# Patient Record
Sex: FEMALE | Race: WHITE | Employment: UNEMPLOYED | URBAN - METROPOLITAN AREA
[De-identification: names, ages, dates, MRNs, and addresses within clinical notes are randomized per-mention and may not be internally consistent; named-entity substitution may affect disease eponyms.]

---

## 2017-12-14 PROBLEM — D69.6 SEVERE THROMBOCYTOPENIA (HCC): Status: ACTIVE | Noted: 2017-12-14

## 2017-12-14 PROBLEM — E80.6 HYPERBILIRUBINEMIA: Status: ACTIVE | Noted: 2017-12-14

## 2017-12-14 PROBLEM — R74.01 TRANSAMINITIS: Status: ACTIVE | Noted: 2017-12-14

## 2017-12-15 PROBLEM — R06.02 SHORTNESS OF BREATH: Status: ACTIVE | Noted: 2017-12-15

## 2017-12-17 PROBLEM — E80.6 HYPERBILIRUBINEMIA: Status: RESOLVED | Noted: 2017-12-14 | Resolved: 2017-12-17

## 2017-12-17 PROBLEM — D69.6 SEVERE THROMBOCYTOPENIA (HCC): Status: RESOLVED | Noted: 2017-12-14 | Resolved: 2017-12-17

## 2017-12-17 PROBLEM — R06.02 SHORTNESS OF BREATH: Status: RESOLVED | Noted: 2017-12-15 | Resolved: 2017-12-17

## 2018-09-25 ENCOUNTER — HOSPITAL ENCOUNTER (EMERGENCY)
Age: 48
Discharge: HOME | End: 2018-09-25
Attending: EMERGENCY MEDICINE

## 2018-09-25 VITALS
BODY MASS INDEX: 23.6 KG/M2 | HEART RATE: 80 BPM | WEIGHT: 125 LBS | SYSTOLIC BLOOD PRESSURE: 118 MMHG | OXYGEN SATURATION: 97 % | TEMPERATURE: 98.3 F | RESPIRATION RATE: 16 BRPM | DIASTOLIC BLOOD PRESSURE: 84 MMHG | HEIGHT: 61 IN

## 2018-09-25 DIAGNOSIS — S39.012A STRAIN OF LUMBAR REGION, INITIAL ENCOUNTER: Primary | ICD-10-CM

## 2018-09-25 PROCEDURE — 96372 THER/PROPH/DIAG INJ SC/IM: CPT

## 2018-09-25 PROCEDURE — 99282 EMERGENCY DEPT VISIT SF MDM: CPT

## 2018-09-25 PROCEDURE — 6360000002 HC RX W HCPCS: Performed by: EMERGENCY MEDICINE

## 2018-09-25 RX ORDER — CYCLOBENZAPRINE HCL 10 MG
10 TABLET ORAL 3 TIMES DAILY PRN
Qty: 15 TABLET | Refills: 0 | Status: SHIPPED | OUTPATIENT
Start: 2018-09-25 | End: 2018-09-30

## 2018-09-25 RX ORDER — ORPHENADRINE CITRATE 30 MG/ML
60 INJECTION INTRAMUSCULAR; INTRAVENOUS ONCE
Status: COMPLETED | OUTPATIENT
Start: 2018-09-25 | End: 2018-09-25

## 2018-09-25 RX ORDER — KETOROLAC TROMETHAMINE 10 MG/1
10 TABLET, FILM COATED ORAL EVERY 6 HOURS PRN
Qty: 20 TABLET | Refills: 0 | Status: SHIPPED | OUTPATIENT
Start: 2018-09-25 | End: 2019-02-28 | Stop reason: SDUPTHER

## 2018-09-25 RX ORDER — KETOROLAC TROMETHAMINE 30 MG/ML
30 INJECTION, SOLUTION INTRAMUSCULAR; INTRAVENOUS ONCE
Status: COMPLETED | OUTPATIENT
Start: 2018-09-25 | End: 2018-09-25

## 2018-09-25 RX ADMIN — KETOROLAC TROMETHAMINE 30 MG: 30 INJECTION, SOLUTION INTRAMUSCULAR at 14:44

## 2018-09-25 RX ADMIN — ORPHENADRINE CITRATE 60 MG: 30 INJECTION INTRAMUSCULAR; INTRAVENOUS at 14:44

## 2018-09-25 ASSESSMENT — PAIN SCALES - GENERAL
PAINLEVEL_OUTOF10: 9
PAINLEVEL_OUTOF10: 4
PAINLEVEL_OUTOF10: 9

## 2018-09-25 ASSESSMENT — PAIN DESCRIPTION - ORIENTATION
ORIENTATION: UPPER;RIGHT;LEFT
ORIENTATION: UPPER

## 2018-09-25 ASSESSMENT — PAIN DESCRIPTION - LOCATION
LOCATION: BACK
LOCATION: BACK

## 2018-09-25 ASSESSMENT — PAIN DESCRIPTION - PAIN TYPE
TYPE: ACUTE PAIN
TYPE: ACUTE PAIN

## 2018-09-25 ASSESSMENT — PAIN DESCRIPTION - FREQUENCY: FREQUENCY: CONTINUOUS

## 2018-09-25 ASSESSMENT — PAIN DESCRIPTION - DESCRIPTORS
DESCRIPTORS: CONSTANT;DISCOMFORT;PRESSURE
DESCRIPTORS: ACHING;CONSTANT

## 2018-09-25 ASSESSMENT — PAIN DESCRIPTION - ONSET: ONSET: GRADUAL

## 2018-09-25 NOTE — LETTER
[address] [facility] Emergency Department  [facility] [address]  Phone: [phone]               September 25, 2018    Patient: Deedee Dow   YOB: 1970   Date of Visit: 9/25/2018       To Whom It May Concern:    Deedee Dow was seen and treated in our emergency department on 9/25/2018. She may return to work on 9/26/2018.   She may also be excused from work on 9/24/2018 due to illness    Sincerely,       Francisco Rust MD         Signature:__________________________________
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
## 2018-09-28 ENCOUNTER — CARE COORDINATION (OUTPATIENT)
Dept: CARE COORDINATION | Age: 48
End: 2018-09-28

## 2019-01-08 DIAGNOSIS — J44.9 COPD, SEVERE (HCC): ICD-10-CM

## 2019-01-08 RX ORDER — ALBUTEROL SULFATE 90 UG/1
2 AEROSOL, METERED RESPIRATORY (INHALATION) EVERY 6 HOURS PRN
Qty: 3 INHALER | Refills: 3 | Status: SHIPPED | OUTPATIENT
Start: 2019-01-08 | End: 2019-10-03 | Stop reason: SDUPTHER

## 2019-02-28 ENCOUNTER — OFFICE VISIT (OUTPATIENT)
Dept: FAMILY MEDICINE CLINIC | Age: 49
End: 2019-02-28
Payer: COMMERCIAL

## 2019-02-28 VITALS
WEIGHT: 120 LBS | HEART RATE: 66 BPM | RESPIRATION RATE: 18 BRPM | HEIGHT: 61 IN | TEMPERATURE: 98.6 F | BODY MASS INDEX: 22.66 KG/M2 | SYSTOLIC BLOOD PRESSURE: 124 MMHG | OXYGEN SATURATION: 97 % | DIASTOLIC BLOOD PRESSURE: 78 MMHG

## 2019-02-28 DIAGNOSIS — J01.10 ACUTE NON-RECURRENT FRONTAL SINUSITIS: ICD-10-CM

## 2019-02-28 DIAGNOSIS — Z12.11 COLON CANCER SCREENING: ICD-10-CM

## 2019-02-28 DIAGNOSIS — R06.00 DYSPNEA, UNSPECIFIED TYPE: Primary | ICD-10-CM

## 2019-02-28 DIAGNOSIS — Z12.39 BREAST CANCER SCREENING: ICD-10-CM

## 2019-02-28 PROCEDURE — G8484 FLU IMMUNIZE NO ADMIN: HCPCS | Performed by: FAMILY MEDICINE

## 2019-02-28 PROCEDURE — 3017F COLORECTAL CA SCREEN DOC REV: CPT | Performed by: FAMILY MEDICINE

## 2019-02-28 PROCEDURE — G8427 DOCREV CUR MEDS BY ELIG CLIN: HCPCS | Performed by: FAMILY MEDICINE

## 2019-02-28 PROCEDURE — 99213 OFFICE O/P EST LOW 20 MIN: CPT | Performed by: FAMILY MEDICINE

## 2019-02-28 PROCEDURE — 99212 OFFICE O/P EST SF 10 MIN: CPT | Performed by: FAMILY MEDICINE

## 2019-02-28 PROCEDURE — 4004F PT TOBACCO SCREEN RCVD TLK: CPT | Performed by: FAMILY MEDICINE

## 2019-02-28 PROCEDURE — G8420 CALC BMI NORM PARAMETERS: HCPCS | Performed by: FAMILY MEDICINE

## 2019-02-28 RX ORDER — BENZONATATE 100 MG/1
100-200 CAPSULE ORAL 3 TIMES DAILY PRN
Qty: 60 CAPSULE | Refills: 0 | Status: SHIPPED | OUTPATIENT
Start: 2019-02-28 | End: 2020-01-21

## 2019-02-28 RX ORDER — KETOROLAC TROMETHAMINE 10 MG/1
10 TABLET, FILM COATED ORAL EVERY 6 HOURS PRN
Qty: 20 TABLET | Refills: 0 | Status: SHIPPED | OUTPATIENT
Start: 2019-02-28 | End: 2019-04-18

## 2019-02-28 RX ORDER — MULTIVIT-MIN/IRON FUM/FOLIC AC 7.5 MG-4
1 TABLET ORAL DAILY
Qty: 30 TABLET | Refills: 3 | COMMUNITY
Start: 2019-02-28 | End: 2019-10-11 | Stop reason: SDUPTHER

## 2019-02-28 RX ORDER — FLUTICASONE PROPIONATE 50 MCG
2 SPRAY, SUSPENSION (ML) NASAL DAILY
Qty: 1 BOTTLE | Refills: 1 | Status: SHIPPED | OUTPATIENT
Start: 2019-02-28 | End: 2019-10-23

## 2019-02-28 ASSESSMENT — ENCOUNTER SYMPTOMS
NAUSEA: 0
CONSTIPATION: 0
COUGH: 1
CHEST TIGHTNESS: 0
ABDOMINAL PAIN: 0
VOMITING: 0
RHINORRHEA: 1
SINUS PAIN: 1
SHORTNESS OF BREATH: 1
SORE THROAT: 0
PHOTOPHOBIA: 0
SINUS PRESSURE: 1
DIARRHEA: 0
ABDOMINAL DISTENTION: 0
WHEEZING: 0

## 2019-03-12 ENCOUNTER — OFFICE VISIT (OUTPATIENT)
Dept: OBGYN | Age: 49
End: 2019-03-12
Payer: COMMERCIAL

## 2019-03-12 ENCOUNTER — HOSPITAL ENCOUNTER (OUTPATIENT)
Age: 49
Discharge: HOME | End: 2019-03-14
Payer: COMMERCIAL

## 2019-03-12 ENCOUNTER — HOSPITAL ENCOUNTER (OUTPATIENT)
Dept: GENERAL RADIOLOGY | Age: 49
Discharge: HOME | End: 2019-03-14
Payer: COMMERCIAL

## 2019-03-12 VITALS
TEMPERATURE: 98.1 F | DIASTOLIC BLOOD PRESSURE: 89 MMHG | BODY MASS INDEX: 21.9 KG/M2 | HEART RATE: 86 BPM | RESPIRATION RATE: 14 BRPM | HEIGHT: 61 IN | SYSTOLIC BLOOD PRESSURE: 128 MMHG | WEIGHT: 116 LBS

## 2019-03-12 DIAGNOSIS — N95.0 PMB (POSTMENOPAUSAL BLEEDING): ICD-10-CM

## 2019-03-12 DIAGNOSIS — Z01.419 ENCOUNTER FOR GYNECOLOGICAL EXAMINATION WITHOUT ABNORMAL FINDING: Primary | ICD-10-CM

## 2019-03-12 DIAGNOSIS — Z12.39 BREAST CANCER SCREENING: ICD-10-CM

## 2019-03-12 DIAGNOSIS — Z12.4 PAP SMEAR FOR CERVICAL CANCER SCREENING: ICD-10-CM

## 2019-03-12 DIAGNOSIS — R10.31 RLQ ABDOMINAL PAIN: ICD-10-CM

## 2019-03-12 LAB — FOLLICLE STIMULATING HORMONE: 76.6 MIU/ML

## 2019-03-12 PROCEDURE — 88175 CYTOPATH C/V AUTO FLUID REDO: CPT

## 2019-03-12 PROCEDURE — 36415 COLL VENOUS BLD VENIPUNCTURE: CPT

## 2019-03-12 PROCEDURE — 99213 OFFICE O/P EST LOW 20 MIN: CPT | Performed by: OBSTETRICS & GYNECOLOGY

## 2019-03-12 PROCEDURE — G8484 FLU IMMUNIZE NO ADMIN: HCPCS | Performed by: OBSTETRICS & GYNECOLOGY

## 2019-03-12 PROCEDURE — 99396 PREV VISIT EST AGE 40-64: CPT | Performed by: OBSTETRICS & GYNECOLOGY

## 2019-03-12 PROCEDURE — 83001 ASSAY OF GONADOTROPIN (FSH): CPT

## 2019-03-12 PROCEDURE — 36415 COLL VENOUS BLD VENIPUNCTURE: CPT | Performed by: OBSTETRICS & GYNECOLOGY

## 2019-03-12 PROCEDURE — 77067 SCR MAMMO BI INCL CAD: CPT

## 2019-03-15 ENCOUNTER — TELEPHONE (OUTPATIENT)
Dept: SURGERY | Age: 49
End: 2019-03-15

## 2019-03-22 RX ORDER — BENZONATATE 100 MG/1
100 CAPSULE ORAL 2 TIMES DAILY PRN
COMMUNITY
End: 2019-04-18

## 2019-03-26 ENCOUNTER — HOSPITAL ENCOUNTER (OUTPATIENT)
Dept: ULTRASOUND IMAGING | Age: 49
Discharge: HOME | End: 2019-03-28
Payer: COMMERCIAL

## 2019-03-26 DIAGNOSIS — R10.31 RLQ ABDOMINAL PAIN: ICD-10-CM

## 2019-03-26 DIAGNOSIS — N95.0 PMB (POSTMENOPAUSAL BLEEDING): ICD-10-CM

## 2019-03-26 PROCEDURE — 76856 US EXAM PELVIC COMPLETE: CPT

## 2019-03-26 PROCEDURE — 76830 TRANSVAGINAL US NON-OB: CPT

## 2019-04-02 ENCOUNTER — OFFICE VISIT (OUTPATIENT)
Dept: OBGYN | Age: 49
End: 2019-04-02
Payer: COMMERCIAL

## 2019-04-02 VITALS
HEART RATE: 80 BPM | BODY MASS INDEX: 22.3 KG/M2 | DIASTOLIC BLOOD PRESSURE: 77 MMHG | SYSTOLIC BLOOD PRESSURE: 145 MMHG | WEIGHT: 118 LBS

## 2019-04-02 DIAGNOSIS — R93.89 ENDOMETRIAL THICKENING ON ULTRASOUND: ICD-10-CM

## 2019-04-02 DIAGNOSIS — N95.0 PMB (POSTMENOPAUSAL BLEEDING): Primary | ICD-10-CM

## 2019-04-02 PROCEDURE — 99212 OFFICE O/P EST SF 10 MIN: CPT | Performed by: OBSTETRICS & GYNECOLOGY

## 2019-04-02 PROCEDURE — 4004F PT TOBACCO SCREEN RCVD TLK: CPT | Performed by: OBSTETRICS & GYNECOLOGY

## 2019-04-02 PROCEDURE — 3017F COLORECTAL CA SCREEN DOC REV: CPT | Performed by: OBSTETRICS & GYNECOLOGY

## 2019-04-02 PROCEDURE — G8427 DOCREV CUR MEDS BY ELIG CLIN: HCPCS | Performed by: OBSTETRICS & GYNECOLOGY

## 2019-04-02 PROCEDURE — G8420 CALC BMI NORM PARAMETERS: HCPCS | Performed by: OBSTETRICS & GYNECOLOGY

## 2019-04-02 NOTE — PROGRESS NOTES
Here to review labs and sonogram.  [address] indicates she is menopausal and the sonogram shows an Endometrium of 8mm. Therefore we need to do a Hysteroscopy and D&C as an outpatient to r/o polyps or other pathology. Discussed procedure, risks and need for same. Perforation discussed. Booklets given, all questions answered. Schedule [facility] as an outpatient. See pre-op.

## 2019-04-08 ENCOUNTER — OFFICE VISIT (OUTPATIENT)
Dept: SURGERY | Age: 49
End: 2019-04-08
Payer: COMMERCIAL

## 2019-04-08 VITALS
TEMPERATURE: 97.9 F | DIASTOLIC BLOOD PRESSURE: 78 MMHG | SYSTOLIC BLOOD PRESSURE: 130 MMHG | HEART RATE: 82 BPM | BODY MASS INDEX: 22.09 KG/M2 | OXYGEN SATURATION: 96 % | WEIGHT: 117 LBS | RESPIRATION RATE: 16 BRPM | HEIGHT: 61 IN

## 2019-04-08 DIAGNOSIS — Z91.89 AT RISK FOR COLON CANCER: Primary | ICD-10-CM

## 2019-04-08 DIAGNOSIS — Z72.0 TOBACCO ABUSE: ICD-10-CM

## 2019-04-08 PROCEDURE — 99999 PR OFFICE/OUTPT VISIT,PROCEDURE ONLY: CPT | Performed by: SURGERY

## 2019-04-08 PROCEDURE — 99202 OFFICE O/P NEW SF 15 MIN: CPT | Performed by: SURGERY

## 2019-04-08 ASSESSMENT — ENCOUNTER SYMPTOMS
BLOOD IN STOOL: 0
SHORTNESS OF BREATH: 1
CHOKING: 0
EYES NEGATIVE: 1
NAUSEA: 0
BACK PAIN: 1
ABDOMINAL PAIN: 0
ANAL BLEEDING: 0
COLOR CHANGE: 0
CHEST TIGHTNESS: 0
CONSTIPATION: 1
DIARRHEA: 0
VOMITING: 0
COUGH: 0
ABDOMINAL DISTENTION: 0
WHEEZING: 0

## 2019-04-08 NOTE — PATIENT INSTRUCTIONS
Call [phone] for any questions/concerns. BOWEL PREP INSTRUCTIONS      It is very important that you follow all of the instructions listed on this sheet carefully to ensure that your colon is cleaned out or your risk of side effects could be increased. What you will Need:  1. Nulytely, Golytely or Colyte Colon prep. You have been given a prescription. 2 Days or More Before Colonoscopy:   Obtain your colon prep from the pharmacy   Do not eat corn, tomatoes, peas, or watermelon 3 to 5 days before procedure. On the Day Before Colonoscopy:  · You may have clear liquids ONLY - No solid food. Do not drink milk   Do not eat or drink anything that is red or purple in color   Do not drink alcohol or beer. The following is OK to eat or drink:  ·  Water, Limeade or lemonade  ·  Strained fruit juices (no pulp) - including apple, orange, white grape or white  cranberry  ·  Coffee or Tea - do not use any milk or creamer  ·  Chicken broth  ·  Jello without added fruit or toppings (No red or purple)    Directions to take Colon Prep:  Step 1. The colon prep can be used with or without the flavor packs. If using flavor, tear open packet and pour into bottle before mixing. Step 2. Add lukewarm water to top line on bottle. Put cap on bottle and shake to dissolve the powder. It should clear like water. Do not put anything else in the bottle. After mixing, keep in the refrigerator. You should drink it within 48 hours. Step 3. The first bowel movement occurs about 1 hour after you start drinking the bowel prep. Continue to drink the bowel prep until the bowel movement is clear like water and free of solid bowel movement  Step 4. Drink 1 (8 oz) glass every 10 minutes. Try not to sip small amounts, but instead drink each glass within a few minutes. Note:    Feelings of bloating and/or nausea are common. This is temporary and will get better once bowel movements begin.     If you have nausea or heart or kidney condition   Treatment with certain medicines, including aspirin and other drugs with anticoagulant or blood-thinning properties   Prior abdominal surgery or radiation treatments   Active colitis , diverticulitis , or other acute bowel disease   Previous treatment with radiation therapy     Be sure to discuss these risks with your doctor before the procedure. What to Expect   Prior to Procedure   Your doctor will likely do the following:   Physical exam   Health history   Review of medicines   Test your stool for hidden blood (called \"occult blood\")     Your colon must be completely clean before the procedure. Any stool left in the intestine will block the view. This preparation may start several days before the procedure. Follow your doctor's instructions. Leading up to your procedure:   Talk to your doctor about your medicines. You may be asked to stop taking some medicines up to one week before the procedure, like:   Anti-inflammatory drugs (e.g., aspirin )   Blood thinners like clopidogrel (Plavix) or warfarin (Coumadin)   Iron supplements or vitamins containing iron   The day or days before your procedure, go on a clear liquid diet (clear broth, clear juice, clear jello) with no red coloring  Do not eat or drink anything after midnight. Wear comfortable clothing. If you have diabetes, ask your doctor if you need to adjust your diabetes medicine on the day prior to your procedure and the day of your procedure. Arrange for a ride home after the procedure. Anesthesia   You will receive intravenous sedation medicine for the procedure so you will not feel anything during the procedure. Description of the Procedure   You will lie on your left side with knees bent and drawn up toward your chest. The colonoscope will be slowly inserted through the rectum and into the bowel. The colonoscope will inject air into the colon.  A small attached video camera will allow the doctor to view the colon's lining on a screen. The doctor will continue guiding the tool through the bowel and assess the lining. A tissue sample or polyps may be removed during the procedure. How Long Will It Take? Usually it takes about 30 to 45 minutes     Will It Hurt? Most people do not feel anything during the procedure and will not remember the procedure. After the procedure, gas pains and cramping are common. These pains should go away with the passing of gas. Post-procedure Care   If any tissue was removed: It will be sent to a lab to be examined. It may take 1-2 weeks for results. The doctor will usually give an initial report after the scope is removed. Other tests may be recommended. A small amount of bleeding may occur during the first few days after the procedure. When you return home after the procedure, be sure to follow your doctor's instructions, which may include:   Resume medicines as instructed by your doctor. Resume normal diet, unless directed otherwise by your doctor. The sedative will make you drowsy. Avoid driving, operating machinery, or making important decisions for the rest of the day. Rest for the remainder of the day. After arriving home, contact your doctor if any of the following occurs:   Bleeding from your rectum, notify your doctor if you pass a teaspoonful of blood or more. Black, tarry stools   Severe abdominal pain   Hard, swollen abdomen   Signs of infection, including fever or chills   Inability to pass gas or stool   Coughing, shortness of breath, chest pain, severe nausea or vomiting     In case of an emergency, CALL 911 .
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
## 2019-04-09 ENCOUNTER — TELEPHONE (OUTPATIENT)
Dept: SURGERY | Age: 49
End: 2019-04-09

## 2019-04-09 NOTE — TELEPHONE ENCOUNTER
Scheduled pt for colonoscopy with Dr. Santiago Plunkett on 5/7/19 at 8:00AM. Pt needs to arrive at [facility] at 7:00AM. Confirmed procedure date time and location with patient. Sent instruction sheet.     Electronically signed by Serenity Nathan on 4/9/19 at 10:07 AM

## 2019-04-16 ENCOUNTER — PREP FOR PROCEDURE (OUTPATIENT)
Dept: OBGYN | Age: 49
End: 2019-04-16

## 2019-04-16 ENCOUNTER — OFFICE VISIT (OUTPATIENT)
Dept: OBGYN | Age: 49
End: 2019-04-16
Payer: COMMERCIAL

## 2019-04-16 VITALS
TEMPERATURE: 98 F | WEIGHT: 120 LBS | SYSTOLIC BLOOD PRESSURE: 167 MMHG | HEART RATE: 77 BPM | BODY MASS INDEX: 22.66 KG/M2 | DIASTOLIC BLOOD PRESSURE: 82 MMHG | RESPIRATION RATE: 18 BRPM | HEIGHT: 61 IN

## 2019-04-16 DIAGNOSIS — R93.89 ENDOMETRIAL THICKENING ON ULTRASOUND: ICD-10-CM

## 2019-04-16 DIAGNOSIS — N95.0 PMB (POSTMENOPAUSAL BLEEDING): Primary | ICD-10-CM

## 2019-04-16 PROCEDURE — 4004F PT TOBACCO SCREEN RCVD TLK: CPT | Performed by: OBSTETRICS & GYNECOLOGY

## 2019-04-16 PROCEDURE — G8427 DOCREV CUR MEDS BY ELIG CLIN: HCPCS | Performed by: OBSTETRICS & GYNECOLOGY

## 2019-04-16 PROCEDURE — 99212 OFFICE O/P EST SF 10 MIN: CPT | Performed by: OBSTETRICS & GYNECOLOGY

## 2019-04-16 PROCEDURE — G8420 CALC BMI NORM PARAMETERS: HCPCS | Performed by: OBSTETRICS & GYNECOLOGY

## 2019-04-16 PROCEDURE — 3017F COLORECTAL CA SCREEN DOC REV: CPT | Performed by: OBSTETRICS & GYNECOLOGY

## 2019-04-16 RX ORDER — SODIUM CHLORIDE, SODIUM LACTATE, POTASSIUM CHLORIDE, CALCIUM CHLORIDE 600; 310; 30; 20 MG/100ML; MG/100ML; MG/100ML; MG/100ML
INJECTION, SOLUTION INTRAVENOUS CONTINUOUS
Status: CANCELLED | OUTPATIENT
Start: 2019-04-16

## 2019-04-16 RX ORDER — SODIUM CHLORIDE 0.9 % (FLUSH) 0.9 %
10 SYRINGE (ML) INJECTION PRN
Status: CANCELLED | OUTPATIENT
Start: 2019-04-16

## 2019-04-16 RX ORDER — SODIUM CHLORIDE 0.9 % (FLUSH) 0.9 %
10 SYRINGE (ML) INJECTION EVERY 12 HOURS SCHEDULED
Status: CANCELLED | OUTPATIENT
Start: 2019-04-16

## 2019-04-16 NOTE — PROGRESS NOTES
Here today for pre-op exam.  Scheduled for Hysteroscopy, D&C next Tuesday. Procedure reviewed at length as well as post o course. Risks, clinton perforation discussed. All questions were answered for the patient. Upper PE is wnl all regions, systems and areas. EMC was only 6mm on sonogram with a small fibroid also noted. ROS: Negative. Proceed with Hysteroscopy, Dilatation and curettage Next week as scheduled. Post operative appt scheduled as well today.

## 2019-04-16 NOTE — PROGRESS NOTES
Patient here for preop exam for [facility] scheduled for 4/23/19 at 0730. POC explained to patient and discharge instructions given to patient by Dr. Ute Acevedo. Preop instructions explained to patient by Norma Carrillo RN. Pt aware she is to be at hospital at 0530 am and NPO after midnight.

## 2019-04-18 RX ORDER — CLONIDINE HYDROCHLORIDE 0.2 MG/1
0.2 TABLET ORAL EVERY EVENING
Status: ON HOLD | COMMUNITY
End: 2019-10-27 | Stop reason: HOSPADM

## 2019-04-18 RX ORDER — DIAZEPAM 5 MG/1
5 TABLET ORAL EVERY 12 HOURS PRN
COMMUNITY
End: 2019-10-11 | Stop reason: ALTCHOICE

## 2019-04-18 RX ORDER — CITALOPRAM 20 MG/1
20 TABLET ORAL EVERY EVENING
COMMUNITY
End: 2019-10-11

## 2019-04-18 NOTE — H&P
[address]                  [address] [facility],  [facility]                              HISTORY AND PHYSICAL    PATIENT NAME: Villa Hummel                   :        1970  MED REC NO:   [id number]                            ROOM:  ACCOUNT NO:   [de-identified]                           ADMIT DATE: 2019  PROVIDER:     Reji Goncalves MD    This is an outpatient history and physical for surgery on 2019. CHIEF COMPLAINT:  Postmenopausal bleeding. HISTORY OF PRESENT ILLNESS:  The patient is a 25-year-old G2, P3 female  whose periods had stopped and then she restarted. This is  postmenopausal bleeding and she enters for dilatation and curettage and  hysteroscopy as an outpatient on 2019. The patient has been fully  counseled in this procedure and its risks including perforation and  enters freely for same. REVIEW OF SYSTEMS:  Negative. PAST MEDICAL HISTORY:  She has had tubal ligation in the past, a D and C  in the past with possible ablation and the LEEP procedure in the past as  well, also has had some bladder repair work. PHYSICAL EXAMINATION:  GENERAL:  A well-developed, well-nourished white female, in no acute  distress. HEENT:  Clear. NECK:  Supple without masses. LUNGS:  Clear to auscultation and percussion. HEART:  Regular rhythm without gallops or murmurs. ABDOMEN:  Soft without masses. Pelvic exam was negative. Normal bowel  sounds were present. IMPRESSION AND PLAN:  Postmenopausal bleeding. The patient is admitted  for hysteroscopy, dilatation and curettage as an outpatient 2019  at 0730.         Mercedez Escobar MD    D: 2019 12:47:13       T: 2019 12:48:47     [email or web address]  Job#: [id number]     Doc#: [id number]    CC:

## 2019-04-23 ENCOUNTER — ANESTHESIA EVENT (OUTPATIENT)
Dept: OPERATING ROOM | Age: 49
End: 2019-04-23
Payer: COMMERCIAL

## 2019-04-23 ENCOUNTER — TELEPHONE (OUTPATIENT)
Dept: OBGYN | Age: 49
End: 2019-04-23

## 2019-04-23 ENCOUNTER — ANESTHESIA (OUTPATIENT)
Dept: OPERATING ROOM | Age: 49
End: 2019-04-23
Payer: COMMERCIAL

## 2019-04-23 ENCOUNTER — HOSPITAL ENCOUNTER (OUTPATIENT)
Age: 49
Setting detail: OUTPATIENT SURGERY
Discharge: HOME | End: 2019-04-23
Attending: OBSTETRICS & GYNECOLOGY | Admitting: OBSTETRICS & GYNECOLOGY
Payer: COMMERCIAL

## 2019-04-23 VITALS
HEIGHT: 61 IN | BODY MASS INDEX: 22.66 KG/M2 | WEIGHT: 120 LBS | OXYGEN SATURATION: 93 % | SYSTOLIC BLOOD PRESSURE: 134 MMHG | DIASTOLIC BLOOD PRESSURE: 65 MMHG | HEART RATE: 69 BPM | RESPIRATION RATE: 16 BRPM | TEMPERATURE: 97.7 F

## 2019-04-23 VITALS — DIASTOLIC BLOOD PRESSURE: 76 MMHG | TEMPERATURE: 93 F | OXYGEN SATURATION: 100 % | SYSTOLIC BLOOD PRESSURE: 116 MMHG

## 2019-04-23 DIAGNOSIS — G89.18 POST-OPERATIVE PAIN: Primary | ICD-10-CM

## 2019-04-23 DIAGNOSIS — G89.18 POST-OP PAIN: ICD-10-CM

## 2019-04-23 DIAGNOSIS — N95.0 PMB (POSTMENOPAUSAL BLEEDING): ICD-10-CM

## 2019-04-23 LAB
HCG, URINE, POC: NEGATIVE
HCT VFR BLD CALC: 44.3 % (ref 34–48)
HEMOGLOBIN: 14.4 G/DL (ref 11.5–15.5)
Lab: NORMAL
MCH RBC QN AUTO: 30.7 PG (ref 26–35)
MCHC RBC AUTO-ENTMCNC: 32.5 % (ref 32–34.5)
MCV RBC AUTO: 94.5 FL (ref 80–99.9)
NEGATIVE QC PASS/FAIL: NORMAL
PDW BLD-RTO: 13.1 FL (ref 11.5–15)
PLATELET # BLD: 221 E9/L (ref 130–450)
PMV BLD AUTO: 9.5 FL (ref 7–12)
POSITIVE QC PASS/FAIL: NORMAL
RBC # BLD: 4.69 E12/L (ref 3.5–5.5)
WBC # BLD: 5.5 E9/L (ref 4.5–11.5)

## 2019-04-23 PROCEDURE — 6370000000 HC RX 637 (ALT 250 FOR IP): Performed by: ANESTHESIOLOGY

## 2019-04-23 PROCEDURE — 7100000010 HC PHASE II RECOVERY - FIRST 15 MIN: Performed by: OBSTETRICS & GYNECOLOGY

## 2019-04-23 PROCEDURE — 88305 TISSUE EXAM BY PATHOLOGIST: CPT

## 2019-04-23 PROCEDURE — 3700000001 HC ADD 15 MINUTES (ANESTHESIA): Performed by: OBSTETRICS & GYNECOLOGY

## 2019-04-23 PROCEDURE — 3600000013 HC SURGERY LEVEL 3 ADDTL 15MIN: Performed by: OBSTETRICS & GYNECOLOGY

## 2019-04-23 PROCEDURE — 2709999900 HC NON-CHARGEABLE SUPPLY: Performed by: OBSTETRICS & GYNECOLOGY

## 2019-04-23 PROCEDURE — 58558 HYSTEROSCOPY BIOPSY: CPT | Performed by: OBSTETRICS & GYNECOLOGY

## 2019-04-23 PROCEDURE — 3700000000 HC ANESTHESIA ATTENDED CARE: Performed by: OBSTETRICS & GYNECOLOGY

## 2019-04-23 PROCEDURE — 7100000001 HC PACU RECOVERY - ADDTL 15 MIN: Performed by: OBSTETRICS & GYNECOLOGY

## 2019-04-23 PROCEDURE — 7100000011 HC PHASE II RECOVERY - ADDTL 15 MIN: Performed by: OBSTETRICS & GYNECOLOGY

## 2019-04-23 PROCEDURE — 6360000002 HC RX W HCPCS: Performed by: NURSE ANESTHETIST, CERTIFIED REGISTERED

## 2019-04-23 PROCEDURE — 2580000003 HC RX 258: Performed by: OBSTETRICS & GYNECOLOGY

## 2019-04-23 PROCEDURE — 3600000003 HC SURGERY LEVEL 3 BASE: Performed by: OBSTETRICS & GYNECOLOGY

## 2019-04-23 PROCEDURE — 85027 COMPLETE CBC AUTOMATED: CPT

## 2019-04-23 PROCEDURE — 7100000000 HC PACU RECOVERY - FIRST 15 MIN: Performed by: OBSTETRICS & GYNECOLOGY

## 2019-04-23 PROCEDURE — 6360000002 HC RX W HCPCS: Performed by: ANESTHESIOLOGY

## 2019-04-23 PROCEDURE — 94664 DEMO&/EVAL PT USE INHALER: CPT

## 2019-04-23 RX ORDER — DEXAMETHASONE SODIUM PHOSPHATE 10 MG/ML
INJECTION INTRAMUSCULAR; INTRAVENOUS PRN
Status: DISCONTINUED | OUTPATIENT
Start: 2019-04-23 | End: 2019-04-23 | Stop reason: SDUPTHER

## 2019-04-23 RX ORDER — FENTANYL CITRATE 50 UG/ML
INJECTION, SOLUTION INTRAMUSCULAR; INTRAVENOUS PRN
Status: DISCONTINUED | OUTPATIENT
Start: 2019-04-23 | End: 2019-04-23 | Stop reason: SDUPTHER

## 2019-04-23 RX ORDER — MIDAZOLAM HYDROCHLORIDE 1 MG/ML
INJECTION INTRAMUSCULAR; INTRAVENOUS PRN
Status: DISCONTINUED | OUTPATIENT
Start: 2019-04-23 | End: 2019-04-23 | Stop reason: SDUPTHER

## 2019-04-23 RX ORDER — FENTANYL CITRATE 50 UG/ML
25 INJECTION, SOLUTION INTRAMUSCULAR; INTRAVENOUS EVERY 5 MIN PRN
Status: DISCONTINUED | OUTPATIENT
Start: 2019-04-23 | End: 2019-04-23 | Stop reason: HOSPADM

## 2019-04-23 RX ORDER — PHENYLEPHRINE HYDROCHLORIDE 10 MG/ML
INJECTION INTRAVENOUS PRN
Status: DISCONTINUED | OUTPATIENT
Start: 2019-04-23 | End: 2019-04-23 | Stop reason: SDUPTHER

## 2019-04-23 RX ORDER — LIDOCAINE HYDROCHLORIDE 20 MG/ML
INJECTION, SOLUTION INTRAVENOUS PRN
Status: DISCONTINUED | OUTPATIENT
Start: 2019-04-23 | End: 2019-04-23 | Stop reason: SDUPTHER

## 2019-04-23 RX ORDER — IBUPROFEN 200 MG
200 TABLET ORAL EVERY 6 HOURS PRN
Qty: 120 TABLET | Refills: 3 | Status: ON HOLD | OUTPATIENT
Start: 2019-04-23 | End: 2019-10-27 | Stop reason: HOSPADM

## 2019-04-23 RX ORDER — ONDANSETRON 2 MG/ML
INJECTION INTRAMUSCULAR; INTRAVENOUS PRN
Status: DISCONTINUED | OUTPATIENT
Start: 2019-04-23 | End: 2019-04-23 | Stop reason: SDUPTHER

## 2019-04-23 RX ORDER — SODIUM CHLORIDE, SODIUM LACTATE, POTASSIUM CHLORIDE, CALCIUM CHLORIDE 600; 310; 30; 20 MG/100ML; MG/100ML; MG/100ML; MG/100ML
INJECTION, SOLUTION INTRAVENOUS CONTINUOUS
Status: DISCONTINUED | OUTPATIENT
Start: 2019-04-23 | End: 2019-04-23 | Stop reason: HOSPADM

## 2019-04-23 RX ORDER — HYDROCODONE BITARTRATE AND ACETAMINOPHEN 5; 325 MG/1; MG/1
2 TABLET ORAL PRN
Status: DISCONTINUED | OUTPATIENT
Start: 2019-04-23 | End: 2019-04-23 | Stop reason: HOSPADM

## 2019-04-23 RX ORDER — MORPHINE SULFATE 2 MG/ML
2 INJECTION, SOLUTION INTRAMUSCULAR; INTRAVENOUS EVERY 5 MIN PRN
Status: DISCONTINUED | OUTPATIENT
Start: 2019-04-23 | End: 2019-04-23 | Stop reason: HOSPADM

## 2019-04-23 RX ORDER — IPRATROPIUM BROMIDE AND ALBUTEROL SULFATE 2.5; .5 MG/3ML; MG/3ML
1 SOLUTION RESPIRATORY (INHALATION) ONCE
Status: COMPLETED | OUTPATIENT
Start: 2019-04-23 | End: 2019-04-23

## 2019-04-23 RX ORDER — SODIUM CHLORIDE 0.9 % (FLUSH) 0.9 %
10 SYRINGE (ML) INJECTION PRN
Status: DISCONTINUED | OUTPATIENT
Start: 2019-04-23 | End: 2019-04-23 | Stop reason: HOSPADM

## 2019-04-23 RX ORDER — PROPOFOL 10 MG/ML
INJECTION, EMULSION INTRAVENOUS PRN
Status: DISCONTINUED | OUTPATIENT
Start: 2019-04-23 | End: 2019-04-23 | Stop reason: SDUPTHER

## 2019-04-23 RX ORDER — HYDROCODONE BITARTRATE AND ACETAMINOPHEN 5; 325 MG/1; MG/1
1 TABLET ORAL PRN
Status: DISCONTINUED | OUTPATIENT
Start: 2019-04-23 | End: 2019-04-23 | Stop reason: HOSPADM

## 2019-04-23 RX ORDER — SODIUM CHLORIDE 0.9 % (FLUSH) 0.9 %
10 SYRINGE (ML) INJECTION EVERY 12 HOURS SCHEDULED
Status: DISCONTINUED | OUTPATIENT
Start: 2019-04-23 | End: 2019-04-23 | Stop reason: HOSPADM

## 2019-04-23 RX ADMIN — DEXAMETHASONE SODIUM PHOSPHATE 10 MG: 10 INJECTION INTRAMUSCULAR; INTRAVENOUS at 07:28

## 2019-04-23 RX ADMIN — ONDANSETRON HYDROCHLORIDE 4 MG: 2 INJECTION, SOLUTION INTRAMUSCULAR; INTRAVENOUS at 08:01

## 2019-04-23 RX ADMIN — IPRATROPIUM BROMIDE AND ALBUTEROL SULFATE 1 AMPULE: .5; 3 SOLUTION RESPIRATORY (INHALATION) at 06:22

## 2019-04-23 RX ADMIN — HYDROMORPHONE HYDROCHLORIDE 0.5 MG: 1 INJECTION, SOLUTION INTRAMUSCULAR; INTRAVENOUS; SUBCUTANEOUS at 00:05

## 2019-04-23 RX ADMIN — HYDROMORPHONE HYDROCHLORIDE 0.5 MG: 1 INJECTION, SOLUTION INTRAMUSCULAR; INTRAVENOUS; SUBCUTANEOUS at 08:25

## 2019-04-23 RX ADMIN — SODIUM CHLORIDE, POTASSIUM CHLORIDE, SODIUM LACTATE AND CALCIUM CHLORIDE: 600; 310; 30; 20 INJECTION, SOLUTION INTRAVENOUS at 07:23

## 2019-04-23 RX ADMIN — PROPOFOL 180 MG: 10 INJECTION, EMULSION INTRAVENOUS at 07:28

## 2019-04-23 RX ADMIN — SODIUM CHLORIDE, POTASSIUM CHLORIDE, SODIUM LACTATE AND CALCIUM CHLORIDE: 600; 310; 30; 20 INJECTION, SOLUTION INTRAVENOUS at 06:04

## 2019-04-23 RX ADMIN — HYDROMORPHONE HYDROCHLORIDE 0.5 MG: 1 INJECTION, SOLUTION INTRAMUSCULAR; INTRAVENOUS; SUBCUTANEOUS at 08:45

## 2019-04-23 RX ADMIN — LIDOCAINE HYDROCHLORIDE 60 MG: 20 INJECTION, SOLUTION INTRAVENOUS at 07:28

## 2019-04-23 RX ADMIN — HYDROMORPHONE HYDROCHLORIDE 0.5 MG: 1 INJECTION, SOLUTION INTRAMUSCULAR; INTRAVENOUS; SUBCUTANEOUS at 08:16

## 2019-04-23 RX ADMIN — MIDAZOLAM HYDROCHLORIDE 2 MG: 1 INJECTION, SOLUTION INTRAMUSCULAR; INTRAVENOUS at 07:23

## 2019-04-23 RX ADMIN — FENTANYL CITRATE 100 MCG: 50 INJECTION, SOLUTION INTRAMUSCULAR; INTRAVENOUS at 07:28

## 2019-04-23 RX ADMIN — PHENYLEPHRINE HYDROCHLORIDE 100 MCG: 10 INJECTION INTRAVENOUS at 07:37

## 2019-04-23 ASSESSMENT — PULMONARY FUNCTION TESTS
PIF_VALUE: 22
PIF_VALUE: 24
PIF_VALUE: 17
PIF_VALUE: 4
PIF_VALUE: 6
PIF_VALUE: 0
PIF_VALUE: 24
PIF_VALUE: 26
PIF_VALUE: 0
PIF_VALUE: 6
PIF_VALUE: 21
PIF_VALUE: 23
PIF_VALUE: 23
PIF_VALUE: 21
PIF_VALUE: 23
PIF_VALUE: 23
PIF_VALUE: 14
PIF_VALUE: 24
PIF_VALUE: 0
PIF_VALUE: 2
PIF_VALUE: 0
PIF_VALUE: 10
PIF_VALUE: 19
PIF_VALUE: 23
PIF_VALUE: 0
PIF_VALUE: 21
PIF_VALUE: 0
PIF_VALUE: 14
PIF_VALUE: 14
PIF_VALUE: 20
PIF_VALUE: 23
PIF_VALUE: 2
PIF_VALUE: 20
PIF_VALUE: 24
PIF_VALUE: 1
PIF_VALUE: 21

## 2019-04-23 ASSESSMENT — PAIN DESCRIPTION - DESCRIPTORS
DESCRIPTORS: CRAMPING;DISCOMFORT
DESCRIPTORS: CRAMPING
DESCRIPTORS: CONSTANT
DESCRIPTORS: CRAMPING;DISCOMFORT
DESCRIPTORS: CRAMPING;DISCOMFORT

## 2019-04-23 ASSESSMENT — ENCOUNTER SYMPTOMS: SHORTNESS OF BREATH: 1

## 2019-04-23 ASSESSMENT — PAIN DESCRIPTION - LOCATION
LOCATION: ABDOMEN

## 2019-04-23 ASSESSMENT — PAIN SCALES - GENERAL
PAINLEVEL_OUTOF10: 3
PAINLEVEL_OUTOF10: 10
PAINLEVEL_OUTOF10: 4
PAINLEVEL_OUTOF10: 8
PAINLEVEL_OUTOF10: 10
PAINLEVEL_OUTOF10: 10
PAINLEVEL_OUTOF10: 4
PAINLEVEL_OUTOF10: 3

## 2019-04-23 ASSESSMENT — PAIN DESCRIPTION - FREQUENCY
FREQUENCY: CONTINUOUS
FREQUENCY: INTERMITTENT

## 2019-04-23 ASSESSMENT — LIFESTYLE VARIABLES: SMOKING_STATUS: 1

## 2019-04-23 ASSESSMENT — PAIN DESCRIPTION - ONSET: ONSET: ON-GOING

## 2019-04-23 ASSESSMENT — PAIN - FUNCTIONAL ASSESSMENT
PAIN_FUNCTIONAL_ASSESSMENT: ACTIVITIES ARE NOT PREVENTED
PAIN_FUNCTIONAL_ASSESSMENT: 0-10

## 2019-04-23 ASSESSMENT — PAIN DESCRIPTION - PROGRESSION: CLINICAL_PROGRESSION: GRADUALLY IMPROVING

## 2019-04-23 NOTE — OP NOTE
[address]                  [address] [facility],  [facility]                                OPERATIVE REPORT    PATIENT NAME: Miguel Pineda                   :        1970  MED REC NO:   [id number]                            ROOM:  ACCOUNT NO:   [de-identified]                           ADMIT DATE: 2019  PROVIDER:     Ronnie Jiménez MD    DATE OF PROCEDURE:  2019    PREOPERATIVE DIAGNOSIS:  Postmenopausal bleeding. POSTOPERATIVE DIAGNOSIS:  Pathology pending. PROCEDURE:  Hysteroscopy, dilatation and curettage. SURGEON:  Ronnie Jiménez MD    DESCRIPTION OF THE PROCEDURE:  After time-out, the patient was placed  under general anesthesia in the dorsal lithotomy position. Bimanual  examination under anesthesia just showed a small uterus in the midline. A weighted speculum was placed in the anterior cervix, grasped with a  sharp tooth tenaculum, and the cervix was progressively dilated with  Hanks dilator to #17 Hanks dilator. The uterine depth is quite small,  only approximately 5 to 6 cm. The 5-mm contact hysteroscope was then  inserted, and direct contact hysteroscopy with saline infusion was  performed which showed a small symmetrical cavity with some very minimal  scarring from her previous ablation. The endometrium throughout  appeared pale and atrophic. All areas were photodocumented, and the  scope was withdrawn. Dilatation was now carried out to #19 Hanks  dilator. The uterine cavity was then curetted with a #1 curette as well as  Kevorkian curette until a sharp gradient sound was obtained in all four  quadrants across the fundus and in the cornual regions indicative of a  clean cavity. Cavity was recuretted with a #1 curette, and only scant  amounts of tissue were obtained throughout the curettage as would be  expected from the appearance of her endometrium.   Specimens were  gathered on Telfa and sent for pathology. Instruments removed. There  is no bleeding from the tenaculum sites, and the patient was awakened  and transferred to recovery room in stable condition. She will be  followed in the clinic as scheduled for her postop visit and results  next week. Estimated blood loss for the procedure is less than 5 ml. The patient tolerated well and was stable upon transfer to recovery  room. SPECIMEN TO LABORATORY:  Endometrial curettings.         Taya Matthew MD    D: 04/23/2019 8:15:43       T: 04/23/2019 8:18:18     [email or web address]  Job#: [id number]     Doc#: [id number]    CC:

## 2019-04-23 NOTE — BRIEF OP NOTE
Brief Postoperative Note  ______________________________________________________________    Patient: Bell Jane  YOB: 1970  MRN: [id number]  Date of Procedure: 4/23/2019    Pre-Op Diagnosis: POST MENOPAUSAL BLEED    Post-Op Diagnosis: Same       Procedure(s):  DILATATION AND CURETTAGE HYSTEROSCOPY    Anesthesia: General    Surgeon(s):  Mahi Hickey MD    Assistant: None    Estimated Blood Loss (mL): less than 50     Complications: None    Specimens:   ID Type Source Tests Collected by Time Destination   A : Endometrial Currettings Genital Vaginal SURGICAL PATHOLOGY Mahi Hickey MD 4/23/2019 0750        Implants:  * No implants in log *      Drains: * No LDAs found *    Findings: See dictated operative report please    Mahi Hickey MD  Date: 4/23/2019  Time: 8:08 AM

## 2019-04-23 NOTE — PROGRESS NOTES
Dr Mcdonnell Medicine notified SaO2 88% on RA, patient used rescue inhaler @ 0430 today & does not use home O2. See orders. O2 4L/NC applied per order.
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
## 2019-04-23 NOTE — INTERVAL H&P NOTE
H&P Update    Patient's History and Physical from April ,  was reviewed. Patient examined. There has been no change. O2 sat low and on O2 4 litres    Proceure reviwed as well as post op nstructions. .    Kenia Steele

## 2019-04-23 NOTE — TELEPHONE ENCOUNTER
Patient called into office stating had her [facility] this am and thought dr Starlet Osgood was sending rx for motrin to her pharmacy but she called and no rx sent . Patient asking for motrin rx to be sent to her pharmacy, pharmacy is correct in epic I checked. Please advise.

## 2019-04-24 ENCOUNTER — TELEPHONE (OUTPATIENT)
Dept: SURGERY | Age: 49
End: 2019-04-24

## 2019-04-24 NOTE — TELEPHONE ENCOUNTER
MA contacted [facility] for prior authorization. No prior authorization needed for outpatient colonoscopy with Dr. Yoan Goins at [facility] in [place] due to doctor and facility being in network. MA Spoke with Britt Gold, authorization Specialist. Reference number [id number]. MA scanned authorization form in media tab.     Electronically signed by Khushboo Jackson on 4/24/19 at 9:54 AM

## 2019-05-01 NOTE — PROGRESS NOTES
[facility] PRE-ADMISSION TESTING ENDOSCOPY INSTRUCTIONS- PAT-phone number:[phone]    ENDOSCOPY INSTRUCTIONS:   [x] Bowel prep instructions reviewed. [x] Nothing by mouth after midnight, including gum, candy, mints, or water. [x] You may brush your teeth, gargle, but do NOT swallow water. [x] Do not wear makeup, lotions, powders, deodorant. Nail polish as directed by the nurse. [x] Arrange transportation to and from the hospital.  Arrange for someone to be with you for the remainder of the day due to having had anesthesia. PARKING INSTRUCTIONS:   [x] Arrival Time:___0730____________________  · [x] Parking lot  \"I\" OR 1 is located on [facility] (the corner of [facility]). To enter, press the button and the gate will lift. A free token will be provided to exit the lot. One car per patient is allowed to park in this lot. All other cars are to park on [address] [place] [address] either in the parking garage or the handicap lot. [] To reach the [facility] from [address] [place] [address], upon entering the hospital, take elevator B to the 3rd floor. EDUCATION INSTRUCTIONS:  [x] Bring a complete list of your medications, please write the last time you took the medicine, give this list to the nurse. [x] Take the following medications the morning of surgery with 1-2 ounces of water: SEE LIST  [x] Stop herbal supplements and vitamins 5 days before your surgery. [] DO NOT take any diabetic medicine the morning of surgery. Follow instructions for insulin the day before surgery. [] If you are diabetic and your blood sugar is low or you feel symptomatic, you may drink 1-2 ounces of apple juice or take a glucose tablet. The morning of your procedure, you may call the pre-op area if you have concerns about your blood sugar [phone]. [x] Use your inhalers the morning of surgery. Bring your emergency inhaler with you day of surgery.   [x] Follow physician instructions regarding

## 2019-05-06 ENCOUNTER — ANESTHESIA EVENT (OUTPATIENT)
Dept: ENDOSCOPY | Age: 49
End: 2019-05-06
Payer: COMMERCIAL

## 2019-05-07 ENCOUNTER — ANESTHESIA (OUTPATIENT)
Dept: ENDOSCOPY | Age: 49
End: 2019-05-07
Payer: COMMERCIAL

## 2019-05-07 ENCOUNTER — HOSPITAL ENCOUNTER (OUTPATIENT)
Age: 49
Setting detail: OUTPATIENT SURGERY
Discharge: HOME | End: 2019-05-07
Attending: SURGERY | Admitting: SURGERY
Payer: COMMERCIAL

## 2019-05-07 VITALS — OXYGEN SATURATION: 97 % | DIASTOLIC BLOOD PRESSURE: 71 MMHG | SYSTOLIC BLOOD PRESSURE: 113 MMHG

## 2019-05-07 VITALS
HEIGHT: 61 IN | HEART RATE: 76 BPM | OXYGEN SATURATION: 95 % | SYSTOLIC BLOOD PRESSURE: 119 MMHG | TEMPERATURE: 98.6 F | RESPIRATION RATE: 20 BRPM | BODY MASS INDEX: 22.66 KG/M2 | WEIGHT: 120 LBS | DIASTOLIC BLOOD PRESSURE: 75 MMHG

## 2019-05-07 PROCEDURE — 2709999900 HC NON-CHARGEABLE SUPPLY: Performed by: SURGERY

## 2019-05-07 PROCEDURE — 6360000002 HC RX W HCPCS

## 2019-05-07 PROCEDURE — 45378 DIAGNOSTIC COLONOSCOPY: CPT | Performed by: SURGERY

## 2019-05-07 PROCEDURE — 3700000001 HC ADD 15 MINUTES (ANESTHESIA): Performed by: SURGERY

## 2019-05-07 PROCEDURE — 2580000003 HC RX 258: Performed by: SURGERY

## 2019-05-07 PROCEDURE — 2500000003 HC RX 250 WO HCPCS

## 2019-05-07 PROCEDURE — 7100000010 HC PHASE II RECOVERY - FIRST 15 MIN: Performed by: SURGERY

## 2019-05-07 PROCEDURE — 7100000011 HC PHASE II RECOVERY - ADDTL 15 MIN: Performed by: SURGERY

## 2019-05-07 PROCEDURE — 3700000000 HC ANESTHESIA ATTENDED CARE: Performed by: SURGERY

## 2019-05-07 PROCEDURE — 3609027000 HC COLONOSCOPY: Performed by: SURGERY

## 2019-05-07 RX ORDER — MIDAZOLAM HYDROCHLORIDE 1 MG/ML
INJECTION INTRAMUSCULAR; INTRAVENOUS PRN
Status: DISCONTINUED | OUTPATIENT
Start: 2019-05-07 | End: 2019-05-07 | Stop reason: SDUPTHER

## 2019-05-07 RX ORDER — PROPOFOL 10 MG/ML
INJECTION, EMULSION INTRAVENOUS PRN
Status: DISCONTINUED | OUTPATIENT
Start: 2019-05-07 | End: 2019-05-07 | Stop reason: SDUPTHER

## 2019-05-07 RX ORDER — SODIUM CHLORIDE 9 MG/ML
INJECTION, SOLUTION INTRAVENOUS CONTINUOUS
Status: DISCONTINUED | OUTPATIENT
Start: 2019-05-07 | End: 2019-05-07 | Stop reason: HOSPADM

## 2019-05-07 RX ORDER — SODIUM CHLORIDE 0.9 % (FLUSH) 0.9 %
10 SYRINGE (ML) INJECTION EVERY 12 HOURS SCHEDULED
Status: DISCONTINUED | OUTPATIENT
Start: 2019-05-07 | End: 2019-05-07 | Stop reason: HOSPADM

## 2019-05-07 RX ORDER — 0.9 % SODIUM CHLORIDE 0.9 %
10 VIAL (ML) INJECTION PRN
Status: DISCONTINUED | OUTPATIENT
Start: 2019-05-07 | End: 2019-05-07 | Stop reason: HOSPADM

## 2019-05-07 RX ORDER — ALFENTANIL HYDROCHLORIDE 500 UG/ML
INJECTION INTRAVENOUS PRN
Status: DISCONTINUED | OUTPATIENT
Start: 2019-05-07 | End: 2019-05-07 | Stop reason: SDUPTHER

## 2019-05-07 RX ADMIN — ALFENTANIL HYDROCHLORIDE 500 MCG: 500 INJECTION INTRAVENOUS at 08:21

## 2019-05-07 RX ADMIN — PROPOFOL 150 MG: 10 INJECTION, EMULSION INTRAVENOUS at 08:21

## 2019-05-07 RX ADMIN — MIDAZOLAM HYDROCHLORIDE 2 MG: 1 INJECTION, SOLUTION INTRAMUSCULAR; INTRAVENOUS at 08:18

## 2019-05-07 RX ADMIN — ALFENTANIL HYDROCHLORIDE 500 MCG: 500 INJECTION INTRAVENOUS at 08:29

## 2019-05-07 RX ADMIN — SODIUM CHLORIDE: 9 INJECTION, SOLUTION INTRAVENOUS at 07:49

## 2019-05-07 ASSESSMENT — LIFESTYLE VARIABLES: SMOKING_STATUS: 1

## 2019-05-07 ASSESSMENT — PAIN SCALES - GENERAL
PAINLEVEL_OUTOF10: 0
PAINLEVEL_OUTOF10: 0

## 2019-05-07 ASSESSMENT — PAIN - FUNCTIONAL ASSESSMENT: PAIN_FUNCTIONAL_ASSESSMENT: 0-10

## 2019-05-07 ASSESSMENT — PAIN DESCRIPTION - PAIN TYPE
TYPE: SURGICAL PAIN
TYPE: SURGICAL PAIN

## 2019-05-07 NOTE — ANESTHESIA POSTPROCEDURE EVALUATION
Department of Anesthesiology  Postprocedure Note    Patient: Shira Richardson  MRN: [id number]  YOB: 1970  Date of evaluation: 5/7/2019  Time:  9:33 AM     Procedure Summary     Date:  05/07/19 Room / Location:  [facility] 02 / [facility]    Anesthesia Start:  0818 Anesthesia Stop:  9370    Procedure:  COLORECTAL CANCER SCREENING, NOT HIGH RISK (N/A ) Diagnosis:  (SCREENING)    Surgeon:  Chepe Sadler MD Responsible Provider:  Patricia Cortes MD    Anesthesia Type:  MAC ASA Status:  3          Anesthesia Type: MAC    Marii Phase I: Marii Score: 10    Marii Phase II: Marii Score: 9    Last vitals: Reviewed and per EMR flowsheets.        Anesthesia Post Evaluation    Patient participation: complete - patient participated  Level of consciousness: awake  Airway patency: patent  Nausea & Vomiting: no nausea and no vomiting  Complications: no  Cardiovascular status: hemodynamically stable  Respiratory status: acceptable  Hydration status: stable
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
## 2019-05-07 NOTE — OP NOTE
COLONOSCOPY PROCEDURE NOTE    DATE OF PROCEDURE: 5/7/2019    PREOPERATIVE DIAGNOSIS:  At risk for colon cancer secondary to age    POSTOPERATIVE DIAGNOSIS/FINDINGS:  Same + marginal prep + diverticula starting at 20 cm    SURGEON: Artemio Haddad MD    ASSISTANT: None    OPERATION: Total Colonoscopy     ANESTHESIA: Local monitored anesthesia. CONDITION: Stable    COMPLICATIONS: None. EBL:  None    SPECIMEN:  None      BRIEF HISTORY:  This is a 46 y.o. female who presents with the complaint of at risk for colon cancer secondary to age. It was recommended the patient undergo a colonoscopy. The risks/benefits/alternatives/expected outcomes were explained the the patient. The patient verbalized understanding and agreed to proceed. PROCEDURE:  The patient was brought into the endoscopy suite and placed in the left lateral decubitus position. A digital rectal exam was performed after the initiation of LMAC anesthesia and failed to reveal any obstructing masses or lesions. A colonoscope was inserted into the patient's anus and passed through the rectum, sigmoid, descending, transverse, and ascending colon all the way to the level of the cecum. Visualization of the cecum was confirmed by visualization of the ileo-cecal valve, confluence of the tinea, and by visualization of the light in the RLQ on the anterior abdominal wall. The scope was then withdrawn the entire length of the colon. There were no masses, polyps, or lesions noted until reaching 20 cm where a few diverticula were seen. Upon reaching the anus, the scope was retroflexed. There were no significant hemorrhoids noted. The scope was straightened and withdrawn entirely. The patient tolerated the procedure well and there were no complications. Prep was marginal with stool residue in right colon that was irrigated away, but could potentially obscure a small mucosal or submucosal lesion; repeat colonoscopy in 10 years.       Adolfo Peoples MD  5/7/2019
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
## 2019-05-09 ENCOUNTER — OFFICE VISIT (OUTPATIENT)
Dept: OBGYN | Age: 49
End: 2019-05-09
Payer: COMMERCIAL

## 2019-05-09 VITALS
HEART RATE: 73 BPM | SYSTOLIC BLOOD PRESSURE: 134 MMHG | BODY MASS INDEX: 22.66 KG/M2 | HEIGHT: 61 IN | RESPIRATION RATE: 16 BRPM | WEIGHT: 120 LBS | TEMPERATURE: 98.8 F | DIASTOLIC BLOOD PRESSURE: 77 MMHG

## 2019-05-09 DIAGNOSIS — Z98.890 POST-OPERATIVE STATE: Primary | ICD-10-CM

## 2019-05-09 PROCEDURE — 99024 POSTOP FOLLOW-UP VISIT: CPT | Performed by: OBSTETRICS & GYNECOLOGY

## 2019-05-09 PROCEDURE — 99213 OFFICE O/P EST LOW 20 MIN: CPT | Performed by: OBSTETRICS & GYNECOLOGY

## 2019-05-09 NOTE — PROGRESS NOTES
Here for post -op visit from her [facility]. Doing well. Mild cramping. Pathology was benign, atrophic endometrium. No tumor. Discussed with the patient at some length. Suggest follow up in about 1 year.

## 2019-05-09 NOTE — PROGRESS NOTES
Results of [facility] reviewed with patient by dr Mark Mcintyre. Plan of care established. Discharge instructions given by dr Mark Mcintyre.

## 2019-07-22 ENCOUNTER — APPOINTMENT (OUTPATIENT)
Dept: GENERAL RADIOLOGY | Age: 49
End: 2019-07-22
Payer: COMMERCIAL

## 2019-07-22 ENCOUNTER — HOSPITAL ENCOUNTER (EMERGENCY)
Age: 49
Discharge: HOME | End: 2019-07-22
Payer: COMMERCIAL

## 2019-07-22 VITALS
BODY MASS INDEX: 23.03 KG/M2 | OXYGEN SATURATION: 99 % | RESPIRATION RATE: 16 BRPM | WEIGHT: 122 LBS | DIASTOLIC BLOOD PRESSURE: 74 MMHG | TEMPERATURE: 98.4 F | SYSTOLIC BLOOD PRESSURE: 108 MMHG | HEIGHT: 61 IN | HEART RATE: 84 BPM

## 2019-07-22 DIAGNOSIS — M54.9 UPPER BACK PAIN ON LEFT SIDE: Primary | ICD-10-CM

## 2019-07-22 PROCEDURE — 99283 EMERGENCY DEPT VISIT LOW MDM: CPT

## 2019-07-22 PROCEDURE — 71101 X-RAY EXAM UNILAT RIBS/CHEST: CPT

## 2019-07-22 RX ORDER — NAPROXEN 500 MG/1
500 TABLET ORAL 2 TIMES DAILY WITH MEALS
Qty: 20 TABLET | Refills: 0 | Status: SHIPPED | OUTPATIENT
Start: 2019-07-22 | End: 2019-08-01

## 2019-07-22 RX ORDER — CHLORZOXAZONE 500 MG/1
500 TABLET ORAL 3 TIMES DAILY PRN
Qty: 15 TABLET | Refills: 0 | Status: SHIPPED | OUTPATIENT
Start: 2019-07-22 | End: 2019-07-27

## 2019-07-22 ASSESSMENT — PAIN SCALES - GENERAL: PAINLEVEL_OUTOF10: 10

## 2019-07-22 ASSESSMENT — PAIN DESCRIPTION - LOCATION: LOCATION: BACK

## 2019-07-22 ASSESSMENT — PAIN - FUNCTIONAL ASSESSMENT: PAIN_FUNCTIONAL_ASSESSMENT: ACTIVITIES ARE NOT PREVENTED

## 2019-07-22 ASSESSMENT — PAIN DESCRIPTION - ORIENTATION: ORIENTATION: LEFT;UPPER

## 2019-07-22 ASSESSMENT — PAIN DESCRIPTION - ONSET: ONSET: ON-GOING

## 2019-07-22 ASSESSMENT — PAIN DESCRIPTION - PAIN TYPE: TYPE: ACUTE PAIN

## 2019-07-22 ASSESSMENT — PAIN DESCRIPTION - DESCRIPTORS: DESCRIPTORS: CONSTANT;SHARP;SHOOTING

## 2019-07-22 ASSESSMENT — PAIN DESCRIPTION - PROGRESSION: CLINICAL_PROGRESSION: NOT CHANGED

## 2019-07-22 ASSESSMENT — PAIN DESCRIPTION - FREQUENCY: FREQUENCY: CONTINUOUS

## 2019-07-23 NOTE — ED NOTES
Patient given discharge paperwork at this time. Patient also given scripts. Patient has no further questions at this time. Nurse provided education to patient. Patient is alert and oriented and VS are stable at this time.         Dionisio Mayen RN  07/22/19 [id number]
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
## 2019-07-26 ENCOUNTER — TELEPHONE (OUTPATIENT)
Dept: FAMILY MEDICINE CLINIC | Age: 49
End: 2019-07-26

## 2019-10-11 ENCOUNTER — OFFICE VISIT (OUTPATIENT)
Dept: FAMILY MEDICINE CLINIC | Age: 49
End: 2019-10-11
Payer: COMMERCIAL

## 2019-10-11 VITALS
DIASTOLIC BLOOD PRESSURE: 88 MMHG | TEMPERATURE: 98.4 F | HEART RATE: 94 BPM | OXYGEN SATURATION: 96 % | SYSTOLIC BLOOD PRESSURE: 126 MMHG | RESPIRATION RATE: 16 BRPM | BODY MASS INDEX: 21.56 KG/M2 | HEIGHT: 61 IN | WEIGHT: 114.2 LBS

## 2019-10-11 DIAGNOSIS — J45.909 MODERATE ASTHMA WITHOUT COMPLICATION, UNSPECIFIED WHETHER PERSISTENT: Primary | ICD-10-CM

## 2019-10-11 DIAGNOSIS — Z76.0 MEDICATION REFILL: ICD-10-CM

## 2019-10-11 DIAGNOSIS — J44.9 COPD, SEVERE (HCC): ICD-10-CM

## 2019-10-11 DIAGNOSIS — K13.70 ORAL LESION: ICD-10-CM

## 2019-10-11 PROCEDURE — 99213 OFFICE O/P EST LOW 20 MIN: CPT | Performed by: STUDENT IN AN ORGANIZED HEALTH CARE EDUCATION/TRAINING PROGRAM

## 2019-10-11 PROCEDURE — G8926 SPIRO NO PERF OR DOC: HCPCS | Performed by: STUDENT IN AN ORGANIZED HEALTH CARE EDUCATION/TRAINING PROGRAM

## 2019-10-11 PROCEDURE — 3023F SPIROM DOC REV: CPT | Performed by: STUDENT IN AN ORGANIZED HEALTH CARE EDUCATION/TRAINING PROGRAM

## 2019-10-11 PROCEDURE — G8482 FLU IMMUNIZE ORDER/ADMIN: HCPCS | Performed by: STUDENT IN AN ORGANIZED HEALTH CARE EDUCATION/TRAINING PROGRAM

## 2019-10-11 PROCEDURE — 99212 OFFICE O/P EST SF 10 MIN: CPT | Performed by: STUDENT IN AN ORGANIZED HEALTH CARE EDUCATION/TRAINING PROGRAM

## 2019-10-11 PROCEDURE — G8427 DOCREV CUR MEDS BY ELIG CLIN: HCPCS | Performed by: STUDENT IN AN ORGANIZED HEALTH CARE EDUCATION/TRAINING PROGRAM

## 2019-10-11 PROCEDURE — 4004F PT TOBACCO SCREEN RCVD TLK: CPT | Performed by: STUDENT IN AN ORGANIZED HEALTH CARE EDUCATION/TRAINING PROGRAM

## 2019-10-11 PROCEDURE — 3017F COLORECTAL CA SCREEN DOC REV: CPT | Performed by: STUDENT IN AN ORGANIZED HEALTH CARE EDUCATION/TRAINING PROGRAM

## 2019-10-11 PROCEDURE — G8420 CALC BMI NORM PARAMETERS: HCPCS | Performed by: STUDENT IN AN ORGANIZED HEALTH CARE EDUCATION/TRAINING PROGRAM

## 2019-10-11 RX ORDER — ALBUTEROL SULFATE 90 UG/1
AEROSOL, METERED RESPIRATORY (INHALATION)
Qty: 162 G | Refills: 5 | Status: SHIPPED | OUTPATIENT
Start: 2019-10-11 | End: 2019-10-16 | Stop reason: SDUPTHER

## 2019-10-11 RX ORDER — ESCITALOPRAM OXALATE 10 MG/1
10 TABLET ORAL EVERY MORNING
COMMUNITY
End: 2019-10-19 | Stop reason: ALTCHOICE

## 2019-10-11 RX ORDER — MULTIVIT-MIN/IRON FUM/FOLIC AC 7.5 MG-4
1 TABLET ORAL DAILY
Qty: 30 TABLET | Refills: 3 | Status: SHIPPED
Start: 2019-10-11 | End: 2020-10-13 | Stop reason: ALTCHOICE

## 2019-10-11 RX ORDER — DIAZEPAM 10 MG/1
10 TABLET ORAL 2 TIMES DAILY PRN
Refills: 0 | Status: ON HOLD | COMMUNITY
Start: 2019-09-19 | End: 2019-10-27 | Stop reason: HOSPADM

## 2019-10-11 ASSESSMENT — ENCOUNTER SYMPTOMS
SHORTNESS OF BREATH: 0
COUGH: 0
ABDOMINAL PAIN: 0

## 2019-10-15 ENCOUNTER — NURSE ONLY (OUTPATIENT)
Dept: FAMILY MEDICINE CLINIC | Age: 49
End: 2019-10-15
Payer: COMMERCIAL

## 2019-10-15 VITALS
OXYGEN SATURATION: 92 % | RESPIRATION RATE: 12 BRPM | HEIGHT: 61 IN | BODY MASS INDEX: 21.14 KG/M2 | WEIGHT: 112 LBS | HEART RATE: 100 BPM | SYSTOLIC BLOOD PRESSURE: 138 MMHG | DIASTOLIC BLOOD PRESSURE: 86 MMHG | TEMPERATURE: 98.4 F

## 2019-10-15 DIAGNOSIS — J44.9 COPD, SEVERE (HCC): ICD-10-CM

## 2019-10-15 LAB
EXPIRATORY TIME-POST: NORMAL SEC
EXPIRATORY TIME: NORMAL SEC
FEF 25-75% %CHNG: NORMAL
FEF 25-75% %PRED-POST: NORMAL %
FEF 25-75% %PRED-PRE: NORMAL L/SEC
FEF 25-75% PRED: NORMAL L/SEC
FEF 25-75%-POST: NORMAL L/SEC
FEF 25-75%-PRE: NORMAL L/SEC
FEV1 %PRED-POST: NORMAL %
FEV1 %PRED-PRE: 80 %
FEV1 PRED: NORMAL L
FEV1-POST: NORMAL L
FEV1-PRE: NORMAL L
FEV1/FVC %PRED-POST: NORMAL %
FEV1/FVC %PRED-PRE: NORMAL %
FEV1/FVC PRED: NORMAL %
FEV1/FVC-POST: NORMAL %
FEV1/FVC-PRE: 66 %
FVC %PRED-POST: NORMAL L
FVC %PRED-PRE: NORMAL %
FVC PRED: NORMAL L
FVC-POST: NORMAL L
FVC-PRE: NORMAL L
PEF %PRED-POST: NORMAL %
PEF %PRED-PRE: NORMAL L/SEC
PEF PRED: NORMAL L/SEC
PEF%CHNG: NORMAL
PEF-POST: NORMAL L/SEC
PEF-PRE: NORMAL L/SEC

## 2019-10-15 PROCEDURE — 6360000002 HC RX W HCPCS

## 2019-10-15 PROCEDURE — 99211 OFF/OP EST MAY X REQ PHY/QHP: CPT | Performed by: COUNSELOR

## 2019-10-15 RX ORDER — ALBUTEROL SULFATE 2.5 MG/3ML
2.5 SOLUTION RESPIRATORY (INHALATION) ONCE
Status: COMPLETED | OUTPATIENT
Start: 2019-10-15 | End: 2019-10-15

## 2019-10-15 RX ADMIN — ALBUTEROL SULFATE 2.5 MG: 2.5 SOLUTION RESPIRATORY (INHALATION) at 08:41

## 2019-10-15 ASSESSMENT — PULMONARY FUNCTION TESTS
FEV1/FVC_PRE: 66
FEV1_PERCENT_PREDICTED_PRE: 80

## 2019-10-16 ENCOUNTER — TELEPHONE (OUTPATIENT)
Dept: FAMILY MEDICINE CLINIC | Age: 49
End: 2019-10-16

## 2019-10-16 DIAGNOSIS — J44.9 COPD, SEVERE (HCC): Primary | ICD-10-CM

## 2019-10-16 DIAGNOSIS — J45.909 MODERATE ASTHMA WITHOUT COMPLICATION, UNSPECIFIED WHETHER PERSISTENT: ICD-10-CM

## 2019-10-16 RX ORDER — ALBUTEROL SULFATE 90 UG/1
AEROSOL, METERED RESPIRATORY (INHALATION)
Qty: 162 G | Refills: 1 | Status: SHIPPED
Start: 2019-10-16 | End: 2020-04-20

## 2019-10-19 ENCOUNTER — HOSPITAL ENCOUNTER (EMERGENCY)
Age: 49
Discharge: HOME | End: 2019-10-19
Attending: EMERGENCY MEDICINE
Payer: COMMERCIAL

## 2019-10-19 VITALS
HEART RATE: 88 BPM | SYSTOLIC BLOOD PRESSURE: 130 MMHG | RESPIRATION RATE: 16 BRPM | BODY MASS INDEX: 21.52 KG/M2 | OXYGEN SATURATION: 98 % | HEIGHT: 61 IN | DIASTOLIC BLOOD PRESSURE: 78 MMHG | TEMPERATURE: 98.8 F | WEIGHT: 114 LBS

## 2019-10-19 DIAGNOSIS — S39.012A BACK STRAIN, INITIAL ENCOUNTER: Primary | ICD-10-CM

## 2019-10-19 PROCEDURE — 99283 EMERGENCY DEPT VISIT LOW MDM: CPT

## 2019-10-19 RX ORDER — CITALOPRAM 10 MG/1
20 TABLET ORAL DAILY
Status: ON HOLD | COMMUNITY
End: 2019-10-27 | Stop reason: HOSPADM

## 2019-10-19 RX ORDER — CARISOPRODOL 350 MG/1
350 TABLET ORAL 4 TIMES DAILY PRN
Qty: 40 TABLET | Refills: 0 | Status: SHIPPED | OUTPATIENT
Start: 2019-10-19 | End: 2019-10-29

## 2019-10-19 ASSESSMENT — PAIN DESCRIPTION - PROGRESSION: CLINICAL_PROGRESSION: GRADUALLY IMPROVING

## 2019-10-19 ASSESSMENT — PAIN DESCRIPTION - DESCRIPTORS: DESCRIPTORS: ACHING

## 2019-10-19 ASSESSMENT — PAIN - FUNCTIONAL ASSESSMENT: PAIN_FUNCTIONAL_ASSESSMENT: 0-10

## 2019-10-19 ASSESSMENT — PAIN DESCRIPTION - LOCATION
LOCATION: BACK
LOCATION: BACK

## 2019-10-19 ASSESSMENT — PAIN DESCRIPTION - ONSET: ONSET: ON-GOING

## 2019-10-19 ASSESSMENT — PAIN DESCRIPTION - PAIN TYPE
TYPE: ACUTE PAIN
TYPE: ACUTE PAIN

## 2019-10-19 ASSESSMENT — PAIN DESCRIPTION - FREQUENCY: FREQUENCY: CONTINUOUS

## 2019-10-19 ASSESSMENT — PAIN SCALES - GENERAL
PAINLEVEL_OUTOF10: 9
PAINLEVEL_OUTOF10: 0

## 2019-10-19 ASSESSMENT — PAIN DESCRIPTION - ORIENTATION
ORIENTATION: MID
ORIENTATION: MID

## 2019-10-23 ENCOUNTER — HOSPITAL ENCOUNTER (INPATIENT)
Age: 49
LOS: 4 days | Discharge: HOME | DRG: 751 | End: 2019-10-27
Attending: EMERGENCY MEDICINE | Admitting: PSYCHIATRY & NEUROLOGY
Payer: COMMERCIAL

## 2019-10-23 DIAGNOSIS — R45.851 SUICIDE IDEATION: ICD-10-CM

## 2019-10-23 DIAGNOSIS — F41.9 ANXIETY: Primary | ICD-10-CM

## 2019-10-23 DIAGNOSIS — F32.A DEPRESSION, UNSPECIFIED DEPRESSION TYPE: ICD-10-CM

## 2019-10-23 PROBLEM — F32.9 MDD (MAJOR DEPRESSIVE DISORDER), SINGLE EPISODE: Status: ACTIVE | Noted: 2019-10-23

## 2019-10-23 LAB
ACETAMINOPHEN LEVEL: <5 MCG/ML (ref 10–30)
AMPHETAMINE SCREEN, URINE: NOT DETECTED
ANION GAP SERPL CALCULATED.3IONS-SCNC: 5 MMOL/L (ref 7–16)
BARBITURATE SCREEN URINE: NOT DETECTED
BASOPHILS ABSOLUTE: 0.03 E9/L (ref 0–0.2)
BASOPHILS RELATIVE PERCENT: 0.5 % (ref 0–2)
BENZODIAZEPINE SCREEN, URINE: POSITIVE
BUN BLDV-MCNC: 17 MG/DL (ref 6–20)
CALCIUM SERPL-MCNC: 9.9 MG/DL (ref 8.6–10.2)
CANNABINOID SCREEN URINE: NOT DETECTED
CHLORIDE BLD-SCNC: 104 MMOL/L (ref 98–107)
CO2: 34 MMOL/L (ref 22–29)
COCAINE METABOLITE SCREEN URINE: POSITIVE
CREAT SERPL-MCNC: 0.9 MG/DL (ref 0.5–1)
EOSINOPHILS ABSOLUTE: 0.16 E9/L (ref 0.05–0.5)
EOSINOPHILS RELATIVE PERCENT: 2.9 % (ref 0–6)
ETHANOL: <10 MG/DL (ref 0–0.08)
GFR AFRICAN AMERICAN: >60
GFR NON-AFRICAN AMERICAN: >60 ML/MIN/1.73
GLUCOSE BLD-MCNC: 82 MG/DL (ref 74–99)
HCT VFR BLD CALC: 45.4 % (ref 34–48)
HEMOGLOBIN: 14 G/DL (ref 11.5–15.5)
IMMATURE GRANULOCYTES #: 0.01 E9/L
IMMATURE GRANULOCYTES %: 0.2 % (ref 0–5)
LYMPHOCYTES ABSOLUTE: 1.12 E9/L (ref 1.5–4)
LYMPHOCYTES RELATIVE PERCENT: 20.1 % (ref 20–42)
Lab: ABNORMAL
MCH RBC QN AUTO: 30.8 PG (ref 26–35)
MCHC RBC AUTO-ENTMCNC: 30.8 % (ref 32–34.5)
MCV RBC AUTO: 100 FL (ref 80–99.9)
METHADONE SCREEN, URINE: NOT DETECTED
MONOCYTES ABSOLUTE: 0.46 E9/L (ref 0.1–0.95)
MONOCYTES RELATIVE PERCENT: 8.2 % (ref 2–12)
NEUTROPHILS ABSOLUTE: 3.8 E9/L (ref 1.8–7.3)
NEUTROPHILS RELATIVE PERCENT: 68.1 % (ref 43–80)
OPIATE SCREEN URINE: NOT DETECTED
PDW BLD-RTO: 13.2 FL (ref 11.5–15)
PHENCYCLIDINE SCREEN URINE: NOT DETECTED
PLATELET # BLD: 225 E9/L (ref 130–450)
PMV BLD AUTO: 10 FL (ref 7–12)
POTASSIUM SERPL-SCNC: 4.2 MMOL/L (ref 3.5–5)
PROPOXYPHENE SCREEN: NOT DETECTED
RBC # BLD: 4.54 E12/L (ref 3.5–5.5)
SALICYLATE, SERUM: <0.3 MG/DL (ref 0–30)
SODIUM BLD-SCNC: 143 MMOL/L (ref 132–146)
TRICYCLIC ANTIDEPRESSANTS SCREEN SERUM: NEGATIVE NG/ML
WBC # BLD: 5.6 E9/L (ref 4.5–11.5)

## 2019-10-23 PROCEDURE — 6370000000 HC RX 637 (ALT 250 FOR IP): Performed by: PSYCHIATRY & NEUROLOGY

## 2019-10-23 PROCEDURE — G0480 DRUG TEST DEF 1-7 CLASSES: HCPCS

## 2019-10-23 PROCEDURE — 36415 COLL VENOUS BLD VENIPUNCTURE: CPT

## 2019-10-23 PROCEDURE — 80048 BASIC METABOLIC PNL TOTAL CA: CPT

## 2019-10-23 PROCEDURE — 99285 EMERGENCY DEPT VISIT HI MDM: CPT

## 2019-10-23 PROCEDURE — 85025 COMPLETE CBC W/AUTO DIFF WBC: CPT

## 2019-10-23 PROCEDURE — 80307 DRUG TEST PRSMV CHEM ANLYZR: CPT

## 2019-10-23 PROCEDURE — 1240000000 HC EMOTIONAL WELLNESS R&B

## 2019-10-23 RX ORDER — NICOTINE 21 MG/24HR
1 PATCH, TRANSDERMAL 24 HOURS TRANSDERMAL DAILY
Status: DISCONTINUED | OUTPATIENT
Start: 2019-10-23 | End: 2019-10-27 | Stop reason: HOSPADM

## 2019-10-23 RX ORDER — OLANZAPINE 5 MG/1
5 TABLET ORAL EVERY 4 HOURS PRN
Status: DISCONTINUED | OUTPATIENT
Start: 2019-10-23 | End: 2019-10-27 | Stop reason: HOSPADM

## 2019-10-23 RX ORDER — TRAZODONE HYDROCHLORIDE 50 MG/1
50 TABLET ORAL NIGHTLY PRN
Status: DISCONTINUED | OUTPATIENT
Start: 2019-10-23 | End: 2019-10-24

## 2019-10-23 RX ORDER — OLANZAPINE 10 MG/1
10 INJECTION, POWDER, LYOPHILIZED, FOR SOLUTION INTRAMUSCULAR EVERY 4 HOURS PRN
Status: DISCONTINUED | OUTPATIENT
Start: 2019-10-23 | End: 2019-10-27 | Stop reason: HOSPADM

## 2019-10-23 RX ORDER — HYDROXYZINE PAMOATE 50 MG/1
50 CAPSULE ORAL 3 TIMES DAILY PRN
Status: DISCONTINUED | OUTPATIENT
Start: 2019-10-23 | End: 2019-10-27 | Stop reason: HOSPADM

## 2019-10-23 RX ORDER — BENZTROPINE MESYLATE 1 MG/ML
2 INJECTION INTRAMUSCULAR; INTRAVENOUS 2 TIMES DAILY PRN
Status: DISCONTINUED | OUTPATIENT
Start: 2019-10-23 | End: 2019-10-27 | Stop reason: HOSPADM

## 2019-10-23 RX ORDER — MAGNESIUM HYDROXIDE/ALUMINUM HYDROXICE/SIMETHICONE 120; 1200; 1200 MG/30ML; MG/30ML; MG/30ML
30 SUSPENSION ORAL PRN
Status: DISCONTINUED | OUTPATIENT
Start: 2019-10-23 | End: 2019-10-27 | Stop reason: HOSPADM

## 2019-10-23 RX ORDER — ACETAMINOPHEN 325 MG/1
650 TABLET ORAL EVERY 4 HOURS PRN
Status: DISCONTINUED | OUTPATIENT
Start: 2019-10-23 | End: 2019-10-27 | Stop reason: HOSPADM

## 2019-10-23 RX ADMIN — ACETAMINOPHEN 650 MG: 325 TABLET, FILM COATED ORAL at 22:58

## 2019-10-23 RX ADMIN — TRAZODONE HYDROCHLORIDE 50 MG: 50 TABLET ORAL at 20:49

## 2019-10-23 ASSESSMENT — ENCOUNTER SYMPTOMS
DOUBLE VISION: 0
COUGH: 0
BLOOD IN STOOL: 0
SORE THROAT: 0
RHINORRHEA: 0
DIARRHEA: 0
SINUS PRESSURE: 0
EYE DISCHARGE: 0
ABDOMINAL DISTENTION: 0
NAUSEA: 0
WHEEZING: 0
STRIDOR: 0
BACK PAIN: 0
SHORTNESS OF BREATH: 0
CONSTIPATION: 0
EYE REDNESS: 0
VOMITING: 0
EYE PAIN: 0
PHOTOPHOBIA: 0
ABDOMINAL PAIN: 0

## 2019-10-23 ASSESSMENT — SLEEP AND FATIGUE QUESTIONNAIRES
DO YOU USE A SLEEP AID: YES
DO YOU HAVE DIFFICULTY SLEEPING: NO
DIFFICULTY STAYING ASLEEP: YES
RESTFUL SLEEP: NO
DIFFICULTY ARISING: NO
DIFFICULTY FALLING ASLEEP: NO
SLEEP PATTERN: DIFFICULTY FALLING ASLEEP
AVERAGE NUMBER OF SLEEP HOURS: 6

## 2019-10-23 ASSESSMENT — PATIENT HEALTH QUESTIONNAIRE - PHQ9: SUM OF ALL RESPONSES TO PHQ QUESTIONS 1-9: 8

## 2019-10-23 ASSESSMENT — PAIN SCALES - GENERAL
PAINLEVEL_OUTOF10: 0
PAINLEVEL_OUTOF10: 10

## 2019-10-23 ASSESSMENT — LIFESTYLE VARIABLES: HISTORY_ALCOHOL_USE: YES

## 2019-10-24 ENCOUNTER — APPOINTMENT (OUTPATIENT)
Dept: GENERAL RADIOLOGY | Age: 49
DRG: 751 | End: 2019-10-24
Payer: COMMERCIAL

## 2019-10-24 PROBLEM — F33.2 MAJOR DEPRESSIVE DISORDER, RECURRENT SEVERE WITHOUT PSYCHOTIC FEATURES (HCC): Status: ACTIVE | Noted: 2019-10-24

## 2019-10-24 PROCEDURE — 6360000002 HC RX W HCPCS: Performed by: INTERNAL MEDICINE

## 2019-10-24 PROCEDURE — 6370000000 HC RX 637 (ALT 250 FOR IP): Performed by: NURSE PRACTITIONER

## 2019-10-24 PROCEDURE — 6370000000 HC RX 637 (ALT 250 FOR IP): Performed by: INTERNAL MEDICINE

## 2019-10-24 PROCEDURE — 71045 X-RAY EXAM CHEST 1 VIEW: CPT

## 2019-10-24 PROCEDURE — 94640 AIRWAY INHALATION TREATMENT: CPT

## 2019-10-24 PROCEDURE — 99222 1ST HOSP IP/OBS MODERATE 55: CPT | Performed by: NURSE PRACTITIONER

## 2019-10-24 PROCEDURE — 6370000000 HC RX 637 (ALT 250 FOR IP): Performed by: PSYCHIATRY & NEUROLOGY

## 2019-10-24 PROCEDURE — 1240000000 HC EMOTIONAL WELLNESS R&B

## 2019-10-24 RX ORDER — DIAZEPAM 5 MG/1
5 TABLET ORAL 3 TIMES DAILY
Status: DISCONTINUED | OUTPATIENT
Start: 2019-10-24 | End: 2019-10-26

## 2019-10-24 RX ORDER — PREDNISONE 20 MG/1
20 TABLET ORAL DAILY
Status: DISCONTINUED | OUTPATIENT
Start: 2019-10-24 | End: 2019-10-27 | Stop reason: HOSPADM

## 2019-10-24 RX ORDER — QUETIAPINE FUMARATE 25 MG/1
50 TABLET, FILM COATED ORAL NIGHTLY
Status: DISCONTINUED | OUTPATIENT
Start: 2019-10-24 | End: 2019-10-25

## 2019-10-24 RX ORDER — DIAZEPAM 5 MG/1
10 TABLET ORAL 2 TIMES DAILY PRN
Status: DISCONTINUED | OUTPATIENT
Start: 2019-10-24 | End: 2019-10-24

## 2019-10-24 RX ORDER — M-VIT,TX,IRON,MINS/CALC/FOLIC 27MG-0.4MG
1 TABLET ORAL DAILY
Status: DISCONTINUED | OUTPATIENT
Start: 2019-10-24 | End: 2019-10-27 | Stop reason: HOSPADM

## 2019-10-24 RX ORDER — IPRATROPIUM BROMIDE AND ALBUTEROL SULFATE 2.5; .5 MG/3ML; MG/3ML
1 SOLUTION RESPIRATORY (INHALATION) 4 TIMES DAILY
Status: DISCONTINUED | OUTPATIENT
Start: 2019-10-24 | End: 2019-10-27 | Stop reason: HOSPADM

## 2019-10-24 RX ORDER — CLONIDINE HYDROCHLORIDE 0.2 MG/1
0.2 TABLET ORAL EVERY EVENING
Status: DISCONTINUED | OUTPATIENT
Start: 2019-10-24 | End: 2019-10-27 | Stop reason: HOSPADM

## 2019-10-24 RX ORDER — PAROXETINE 10 MG/1
10 TABLET, FILM COATED ORAL DAILY
Status: DISCONTINUED | OUTPATIENT
Start: 2019-10-24 | End: 2019-10-25

## 2019-10-24 RX ORDER — BUDESONIDE 0.25 MG/2ML
0.5 INHALANT ORAL 2 TIMES DAILY
Status: DISCONTINUED | OUTPATIENT
Start: 2019-10-24 | End: 2019-10-27 | Stop reason: HOSPADM

## 2019-10-24 RX ORDER — ALBUTEROL SULFATE 90 UG/1
2 AEROSOL, METERED RESPIRATORY (INHALATION) EVERY 6 HOURS PRN
Status: DISCONTINUED | OUTPATIENT
Start: 2019-10-24 | End: 2019-10-27 | Stop reason: HOSPADM

## 2019-10-24 RX ADMIN — MULTIPLE VITAMINS W/ MINERALS TAB 1 TABLET: TAB at 09:37

## 2019-10-24 RX ADMIN — QUETIAPINE FUMARATE 50 MG: 25 TABLET ORAL at 20:57

## 2019-10-24 RX ADMIN — IPRATROPIUM BROMIDE AND ALBUTEROL SULFATE 1 AMPULE: .5; 3 SOLUTION RESPIRATORY (INHALATION) at 13:29

## 2019-10-24 RX ADMIN — BUDESONIDE 500 MCG: 0.25 SUSPENSION RESPIRATORY (INHALATION) at 21:26

## 2019-10-24 RX ADMIN — IPRATROPIUM BROMIDE AND ALBUTEROL SULFATE 1 AMPULE: .5; 3 SOLUTION RESPIRATORY (INHALATION) at 21:26

## 2019-10-24 RX ADMIN — DIAZEPAM 5 MG: 5 TABLET ORAL at 09:37

## 2019-10-24 RX ADMIN — DIAZEPAM 5 MG: 5 TABLET ORAL at 20:57

## 2019-10-24 RX ADMIN — PAROXETINE 10 MG: 10 TABLET, FILM COATED ORAL at 09:37

## 2019-10-24 RX ADMIN — IPRATROPIUM BROMIDE AND ALBUTEROL SULFATE 1 AMPULE: .5; 3 SOLUTION RESPIRATORY (INHALATION) at 18:00

## 2019-10-24 RX ADMIN — PREDNISONE 20 MG: 20 TABLET ORAL at 12:43

## 2019-10-24 RX ADMIN — TIOTROPIUM BROMIDE 18 MCG: 18 CAPSULE ORAL; RESPIRATORY (INHALATION) at 15:04

## 2019-10-24 RX ADMIN — DIAZEPAM 5 MG: 5 TABLET ORAL at 14:26

## 2019-10-24 RX ADMIN — CLONIDINE HYDROCHLORIDE 0.2 MG: 0.2 TABLET ORAL at 17:13

## 2019-10-24 RX ADMIN — BUDESONIDE 500 MCG: 0.25 SUSPENSION RESPIRATORY (INHALATION) at 13:28

## 2019-10-24 ASSESSMENT — PAIN SCALES - GENERAL: PAINLEVEL_OUTOF10: 0

## 2019-10-24 ASSESSMENT — SLEEP AND FATIGUE QUESTIONNAIRES
DO YOU HAVE DIFFICULTY SLEEPING: NO
DIFFICULTY FALLING ASLEEP: NO
SLEEP PATTERN: DIFFICULTY FALLING ASLEEP
DO YOU USE A SLEEP AID: YES
DIFFICULTY STAYING ASLEEP: YES
DIFFICULTY ARISING: NO
AVERAGE NUMBER OF SLEEP HOURS: 6
RESTFUL SLEEP: NO

## 2019-10-24 ASSESSMENT — LIFESTYLE VARIABLES: HISTORY_ALCOHOL_USE: YES

## 2019-10-24 ASSESSMENT — PATIENT HEALTH QUESTIONNAIRE - PHQ9: SUM OF ALL RESPONSES TO PHQ QUESTIONS 1-9: 9

## 2019-10-25 PROCEDURE — 6370000000 HC RX 637 (ALT 250 FOR IP): Performed by: PSYCHIATRY & NEUROLOGY

## 2019-10-25 PROCEDURE — 1240000000 HC EMOTIONAL WELLNESS R&B

## 2019-10-25 PROCEDURE — 99232 SBSQ HOSP IP/OBS MODERATE 35: CPT | Performed by: NURSE PRACTITIONER

## 2019-10-25 PROCEDURE — 6370000000 HC RX 637 (ALT 250 FOR IP): Performed by: NURSE PRACTITIONER

## 2019-10-25 PROCEDURE — 6370000000 HC RX 637 (ALT 250 FOR IP): Performed by: INTERNAL MEDICINE

## 2019-10-25 RX ORDER — QUETIAPINE FUMARATE 100 MG/1
100 TABLET, FILM COATED ORAL NIGHTLY
Status: DISCONTINUED | OUTPATIENT
Start: 2019-10-25 | End: 2019-10-27 | Stop reason: HOSPADM

## 2019-10-25 RX ORDER — PAROXETINE HYDROCHLORIDE 20 MG/1
20 TABLET, FILM COATED ORAL DAILY
Status: DISCONTINUED | OUTPATIENT
Start: 2019-10-26 | End: 2019-10-26

## 2019-10-25 RX ADMIN — PREDNISONE 20 MG: 20 TABLET ORAL at 08:39

## 2019-10-25 RX ADMIN — DIAZEPAM 5 MG: 5 TABLET ORAL at 21:16

## 2019-10-25 RX ADMIN — DIAZEPAM 5 MG: 5 TABLET ORAL at 08:39

## 2019-10-25 RX ADMIN — TIOTROPIUM BROMIDE 18 MCG: 18 CAPSULE ORAL; RESPIRATORY (INHALATION) at 08:40

## 2019-10-25 RX ADMIN — CLONIDINE HYDROCHLORIDE 0.2 MG: 0.2 TABLET ORAL at 16:07

## 2019-10-25 RX ADMIN — MULTIPLE VITAMINS W/ MINERALS TAB 1 TABLET: TAB at 08:39

## 2019-10-25 RX ADMIN — DIAZEPAM 5 MG: 5 TABLET ORAL at 14:18

## 2019-10-25 RX ADMIN — ALBUTEROL SULFATE 2 PUFF: 90 AEROSOL, METERED RESPIRATORY (INHALATION) at 21:15

## 2019-10-25 RX ADMIN — QUETIAPINE FUMARATE 100 MG: 100 TABLET ORAL at 21:16

## 2019-10-25 RX ADMIN — MAGNESIUM HYDROXIDE 30 ML: 400 SUSPENSION ORAL at 12:46

## 2019-10-25 RX ADMIN — PAROXETINE 10 MG: 10 TABLET, FILM COATED ORAL at 08:39

## 2019-10-25 ASSESSMENT — PAIN SCALES - GENERAL: PAINLEVEL_OUTOF10: 0

## 2019-10-26 PROCEDURE — 6370000000 HC RX 637 (ALT 250 FOR IP): Performed by: NURSE PRACTITIONER

## 2019-10-26 PROCEDURE — 6370000000 HC RX 637 (ALT 250 FOR IP): Performed by: PSYCHIATRY & NEUROLOGY

## 2019-10-26 PROCEDURE — 99232 SBSQ HOSP IP/OBS MODERATE 35: CPT | Performed by: NURSE PRACTITIONER

## 2019-10-26 PROCEDURE — 1240000000 HC EMOTIONAL WELLNESS R&B

## 2019-10-26 PROCEDURE — 6370000000 HC RX 637 (ALT 250 FOR IP): Performed by: INTERNAL MEDICINE

## 2019-10-26 PROCEDURE — 94640 AIRWAY INHALATION TREATMENT: CPT

## 2019-10-26 PROCEDURE — 6360000002 HC RX W HCPCS: Performed by: INTERNAL MEDICINE

## 2019-10-26 RX ORDER — DIAZEPAM 5 MG/1
5 TABLET ORAL 2 TIMES DAILY
Status: DISCONTINUED | OUTPATIENT
Start: 2019-10-26 | End: 2019-10-27 | Stop reason: HOSPADM

## 2019-10-26 RX ORDER — PAROXETINE HYDROCHLORIDE 20 MG/1
20 TABLET, FILM COATED ORAL NIGHTLY
Status: DISCONTINUED | OUTPATIENT
Start: 2019-10-26 | End: 2019-10-27 | Stop reason: HOSPADM

## 2019-10-26 RX ADMIN — TIOTROPIUM BROMIDE 18 MCG: 18 CAPSULE ORAL; RESPIRATORY (INHALATION) at 10:20

## 2019-10-26 RX ADMIN — MULTIPLE VITAMINS W/ MINERALS TAB 1 TABLET: TAB at 09:19

## 2019-10-26 RX ADMIN — PAROXETINE HYDROCHLORIDE 20 MG: 20 TABLET, FILM COATED ORAL at 20:46

## 2019-10-26 RX ADMIN — DIAZEPAM 5 MG: 5 TABLET ORAL at 20:46

## 2019-10-26 RX ADMIN — QUETIAPINE FUMARATE 100 MG: 100 TABLET ORAL at 20:46

## 2019-10-26 RX ADMIN — IPRATROPIUM BROMIDE AND ALBUTEROL SULFATE 1 AMPULE: .5; 3 SOLUTION RESPIRATORY (INHALATION) at 13:50

## 2019-10-26 RX ADMIN — CLONIDINE HYDROCHLORIDE 0.2 MG: 0.2 TABLET ORAL at 17:31

## 2019-10-26 RX ADMIN — MAGNESIUM HYDROXIDE 30 ML: 400 SUSPENSION ORAL at 17:27

## 2019-10-26 RX ADMIN — BUDESONIDE 500 MCG: 0.25 SUSPENSION RESPIRATORY (INHALATION) at 08:42

## 2019-10-26 RX ADMIN — PREDNISONE 20 MG: 20 TABLET ORAL at 09:19

## 2019-10-26 RX ADMIN — ALBUTEROL SULFATE 2 PUFF: 90 AEROSOL, METERED RESPIRATORY (INHALATION) at 13:26

## 2019-10-26 RX ADMIN — IPRATROPIUM BROMIDE AND ALBUTEROL SULFATE 1 AMPULE: .5; 3 SOLUTION RESPIRATORY (INHALATION) at 08:42

## 2019-10-26 RX ADMIN — IPRATROPIUM BROMIDE AND ALBUTEROL SULFATE 1 AMPULE: .5; 3 SOLUTION RESPIRATORY (INHALATION) at 17:27

## 2019-10-26 RX ADMIN — DIAZEPAM 5 MG: 5 TABLET ORAL at 09:19

## 2019-10-26 ASSESSMENT — PAIN SCALES - GENERAL
PAINLEVEL_OUTOF10: 0
PAINLEVEL_OUTOF10: 0

## 2019-10-27 VITALS
TEMPERATURE: 97.4 F | DIASTOLIC BLOOD PRESSURE: 66 MMHG | OXYGEN SATURATION: 93 % | RESPIRATION RATE: 16 BRPM | SYSTOLIC BLOOD PRESSURE: 107 MMHG | BODY MASS INDEX: 21.14 KG/M2 | HEART RATE: 56 BPM | HEIGHT: 61 IN | WEIGHT: 112 LBS

## 2019-10-27 LAB — COCAINE, CONFIRM, URINE: >1000 NG/ML

## 2019-10-27 PROCEDURE — 6370000000 HC RX 637 (ALT 250 FOR IP): Performed by: NURSE PRACTITIONER

## 2019-10-27 PROCEDURE — 6370000000 HC RX 637 (ALT 250 FOR IP): Performed by: PSYCHIATRY & NEUROLOGY

## 2019-10-27 PROCEDURE — 6370000000 HC RX 637 (ALT 250 FOR IP): Performed by: INTERNAL MEDICINE

## 2019-10-27 PROCEDURE — 6360000002 HC RX W HCPCS: Performed by: INTERNAL MEDICINE

## 2019-10-27 PROCEDURE — 94640 AIRWAY INHALATION TREATMENT: CPT

## 2019-10-27 PROCEDURE — 99238 HOSP IP/OBS DSCHRG MGMT 30/<: CPT | Performed by: NURSE PRACTITIONER

## 2019-10-27 RX ORDER — QUETIAPINE FUMARATE 100 MG/1
100 TABLET, FILM COATED ORAL NIGHTLY
Qty: 60 TABLET | Refills: 0 | Status: SHIPPED | OUTPATIENT
Start: 2019-10-27 | End: 2020-09-22

## 2019-10-27 RX ORDER — NICOTINE 21 MG/24HR
1 PATCH, TRANSDERMAL 24 HOURS TRANSDERMAL DAILY
Qty: 30 PATCH | Refills: 0 | Status: SHIPPED | OUTPATIENT
Start: 2019-10-28 | End: 2019-12-02 | Stop reason: SDUPTHER

## 2019-10-27 RX ORDER — PAROXETINE HYDROCHLORIDE 20 MG/1
20 TABLET, FILM COATED ORAL NIGHTLY
Qty: 30 TABLET | Refills: 0 | Status: SHIPPED | OUTPATIENT
Start: 2019-10-27 | End: 2020-10-20 | Stop reason: SINTOL

## 2019-10-27 RX ORDER — DIAZEPAM 5 MG/1
5 TABLET ORAL 2 TIMES DAILY
Qty: 20 TABLET | Refills: 0 | Status: SHIPPED | OUTPATIENT
Start: 2019-10-27 | End: 2019-11-06

## 2019-10-27 RX ADMIN — MULTIPLE VITAMINS W/ MINERALS TAB 1 TABLET: TAB at 09:16

## 2019-10-27 RX ADMIN — TIOTROPIUM BROMIDE 18 MCG: 18 CAPSULE ORAL; RESPIRATORY (INHALATION) at 09:53

## 2019-10-27 RX ADMIN — DIAZEPAM 5 MG: 5 TABLET ORAL at 09:16

## 2019-10-27 RX ADMIN — BUDESONIDE 500 MCG: 0.25 SUSPENSION RESPIRATORY (INHALATION) at 10:30

## 2019-10-27 RX ADMIN — IPRATROPIUM BROMIDE AND ALBUTEROL SULFATE 1 AMPULE: .5; 3 SOLUTION RESPIRATORY (INHALATION) at 10:30

## 2019-10-27 RX ADMIN — PREDNISONE 20 MG: 20 TABLET ORAL at 09:16

## 2019-10-27 RX ADMIN — ALBUTEROL SULFATE 2 PUFF: 90 AEROSOL, METERED RESPIRATORY (INHALATION) at 13:37

## 2019-10-29 ENCOUNTER — TELEPHONE (OUTPATIENT)
Dept: PULMONOLOGY | Age: 49
End: 2019-10-29

## 2019-10-29 DIAGNOSIS — J44.9 COPD, SEVERE (HCC): ICD-10-CM

## 2019-10-29 LAB
7-AMINOCLONAZEPAM, URINE: <5 NG/ML
ALPHA-HYDROXYALPRAZOLAM, URINE: <5 NG/ML
ALPHA-HYDROXYMIDAZOLAM, URINE: <20 NG/ML
ALPRAZOLAM, URINE: <5 NG/ML
CHLORDIAZEPOXIDE, URINE: <20 NG/ML
CLONAZEPAM, URINE: <5 NG/ML
DIAZEPAM, URINE: <20 NG/ML
LORAZEPAM, URINE: <20 NG/ML
MIDAZOLAM, URINE: <20 NG/ML
NORDIAZEPAM, URINE: 1129 NG/ML
OXAZEPAM, URINE: 3549 NG/ML
TEMAZEPAM, URINE: 2866 NG/ML

## 2019-12-02 ENCOUNTER — OFFICE VISIT (OUTPATIENT)
Dept: FAMILY MEDICINE CLINIC | Age: 49
End: 2019-12-02
Payer: COMMERCIAL

## 2019-12-02 ENCOUNTER — TELEPHONE (OUTPATIENT)
Dept: FAMILY MEDICINE CLINIC | Age: 49
End: 2019-12-02

## 2019-12-02 VITALS
RESPIRATION RATE: 18 BRPM | DIASTOLIC BLOOD PRESSURE: 87 MMHG | WEIGHT: 120 LBS | HEART RATE: 70 BPM | BODY MASS INDEX: 22.66 KG/M2 | OXYGEN SATURATION: 91 % | HEIGHT: 61 IN | TEMPERATURE: 97.7 F | SYSTOLIC BLOOD PRESSURE: 146 MMHG

## 2019-12-02 DIAGNOSIS — Z76.0 MEDICATION REFILL: ICD-10-CM

## 2019-12-02 DIAGNOSIS — H65.91 RIGHT NON-SUPPURATIVE OTITIS MEDIA: ICD-10-CM

## 2019-12-02 DIAGNOSIS — F33.2 MAJOR DEPRESSIVE DISORDER, RECURRENT SEVERE WITHOUT PSYCHOTIC FEATURES (HCC): ICD-10-CM

## 2019-12-02 DIAGNOSIS — H92.01 OTALGIA OF RIGHT EAR: Primary | ICD-10-CM

## 2019-12-02 PROBLEM — R74.01 TRANSAMINITIS: Status: RESOLVED | Noted: 2017-12-14 | Resolved: 2019-12-02

## 2019-12-02 PROCEDURE — 99213 OFFICE O/P EST LOW 20 MIN: CPT | Performed by: STUDENT IN AN ORGANIZED HEALTH CARE EDUCATION/TRAINING PROGRAM

## 2019-12-02 PROCEDURE — 99212 OFFICE O/P EST SF 10 MIN: CPT | Performed by: STUDENT IN AN ORGANIZED HEALTH CARE EDUCATION/TRAINING PROGRAM

## 2019-12-02 RX ORDER — AMOXICILLIN 875 MG/1
875 TABLET, COATED ORAL 2 TIMES DAILY
Qty: 14 TABLET | Refills: 0 | Status: SHIPPED | OUTPATIENT
Start: 2019-12-02 | End: 2019-12-09

## 2019-12-02 RX ORDER — NICOTINE 21 MG/24HR
1 PATCH, TRANSDERMAL 24 HOURS TRANSDERMAL DAILY
Qty: 30 PATCH | Refills: 1 | Status: SHIPPED | OUTPATIENT
Start: 2019-12-02 | End: 2020-01-21 | Stop reason: SDUPTHER

## 2019-12-04 ASSESSMENT — ENCOUNTER SYMPTOMS
ABDOMINAL PAIN: 0
COUGH: 0
SHORTNESS OF BREATH: 0

## 2019-12-12 ENCOUNTER — OFFICE VISIT (OUTPATIENT)
Dept: FAMILY MEDICINE CLINIC | Age: 49
End: 2019-12-12
Payer: COMMERCIAL

## 2019-12-12 VITALS
HEIGHT: 61 IN | WEIGHT: 118 LBS | SYSTOLIC BLOOD PRESSURE: 135 MMHG | OXYGEN SATURATION: 97 % | BODY MASS INDEX: 22.28 KG/M2 | TEMPERATURE: 99.1 F | HEART RATE: 74 BPM | DIASTOLIC BLOOD PRESSURE: 76 MMHG

## 2019-12-12 DIAGNOSIS — Z13.31 POSITIVE DEPRESSION SCREENING: ICD-10-CM

## 2019-12-12 DIAGNOSIS — R10.13 EPIGASTRIC PAIN: Primary | ICD-10-CM

## 2019-12-12 PROCEDURE — G8428 CUR MEDS NOT DOCUMENT: HCPCS | Performed by: STUDENT IN AN ORGANIZED HEALTH CARE EDUCATION/TRAINING PROGRAM

## 2019-12-12 PROCEDURE — G8431 POS CLIN DEPRES SCRN F/U DOC: HCPCS | Performed by: STUDENT IN AN ORGANIZED HEALTH CARE EDUCATION/TRAINING PROGRAM

## 2019-12-12 PROCEDURE — G8482 FLU IMMUNIZE ORDER/ADMIN: HCPCS | Performed by: STUDENT IN AN ORGANIZED HEALTH CARE EDUCATION/TRAINING PROGRAM

## 2019-12-12 PROCEDURE — 99214 OFFICE O/P EST MOD 30 MIN: CPT | Performed by: STUDENT IN AN ORGANIZED HEALTH CARE EDUCATION/TRAINING PROGRAM

## 2019-12-12 PROCEDURE — 3017F COLORECTAL CA SCREEN DOC REV: CPT | Performed by: STUDENT IN AN ORGANIZED HEALTH CARE EDUCATION/TRAINING PROGRAM

## 2019-12-12 PROCEDURE — 4004F PT TOBACCO SCREEN RCVD TLK: CPT | Performed by: STUDENT IN AN ORGANIZED HEALTH CARE EDUCATION/TRAINING PROGRAM

## 2019-12-12 PROCEDURE — 96160 PT-FOCUSED HLTH RISK ASSMT: CPT | Performed by: STUDENT IN AN ORGANIZED HEALTH CARE EDUCATION/TRAINING PROGRAM

## 2019-12-12 PROCEDURE — 99212 OFFICE O/P EST SF 10 MIN: CPT | Performed by: STUDENT IN AN ORGANIZED HEALTH CARE EDUCATION/TRAINING PROGRAM

## 2019-12-12 PROCEDURE — G8420 CALC BMI NORM PARAMETERS: HCPCS | Performed by: STUDENT IN AN ORGANIZED HEALTH CARE EDUCATION/TRAINING PROGRAM

## 2019-12-12 RX ORDER — CLONIDINE HYDROCHLORIDE 0.2 MG/1
TABLET ORAL
Refills: 0 | COMMUNITY
Start: 2019-11-05

## 2019-12-12 RX ORDER — ONDANSETRON 4 MG/1
4 TABLET, FILM COATED ORAL EVERY 8 HOURS PRN
Qty: 15 TABLET | Refills: 0 | Status: SHIPPED
Start: 2019-12-12 | End: 2020-09-22

## 2019-12-12 RX ORDER — TIOTROPIUM BROMIDE INHALATION SPRAY 3.12 UG/1
SPRAY, METERED RESPIRATORY (INHALATION)
Refills: 0 | COMMUNITY
Start: 2019-10-16 | End: 2020-01-21 | Stop reason: SDUPTHER

## 2019-12-12 RX ORDER — DIAZEPAM 10 MG/1
10 TABLET ORAL 3 TIMES DAILY PRN
Refills: 0 | Status: ON HOLD | COMMUNITY
Start: 2019-11-14 | End: 2021-09-27 | Stop reason: HOSPADM

## 2019-12-12 ASSESSMENT — ENCOUNTER SYMPTOMS
ANAL BLEEDING: 0
COUGH: 0
BLOOD IN STOOL: 0
PHOTOPHOBIA: 0
ABDOMINAL DISTENTION: 0
DIARRHEA: 1
NAUSEA: 1
ABDOMINAL PAIN: 1
SHORTNESS OF BREATH: 0
VOMITING: 0
CONSTIPATION: 0

## 2019-12-12 ASSESSMENT — PATIENT HEALTH QUESTIONNAIRE - PHQ9
4. FEELING TIRED OR HAVING LITTLE ENERGY: 3
8. MOVING OR SPEAKING SO SLOWLY THAT OTHER PEOPLE COULD HAVE NOTICED. OR THE OPPOSITE, BEING SO FIGETY OR RESTLESS THAT YOU HAVE BEEN MOVING AROUND A LOT MORE THAN USUAL: 3
SUM OF ALL RESPONSES TO PHQ9 QUESTIONS 1 & 2: 3
SUM OF ALL RESPONSES TO PHQ QUESTIONS 1-9: 17
SUM OF ALL RESPONSES TO PHQ QUESTIONS 1-9: 17
5. POOR APPETITE OR OVEREATING: 3
10. IF YOU CHECKED OFF ANY PROBLEMS, HOW DIFFICULT HAVE THESE PROBLEMS MADE IT FOR YOU TO DO YOUR WORK, TAKE CARE OF THINGS AT HOME, OR GET ALONG WITH OTHER PEOPLE: 1
7. TROUBLE CONCENTRATING ON THINGS, SUCH AS READING THE NEWSPAPER OR WATCHING TELEVISION: 3
2. FEELING DOWN, DEPRESSED OR HOPELESS: 1
3. TROUBLE FALLING OR STAYING ASLEEP: 2
6. FEELING BAD ABOUT YOURSELF - OR THAT YOU ARE A FAILURE OR HAVE LET YOURSELF OR YOUR FAMILY DOWN: 0
1. LITTLE INTEREST OR PLEASURE IN DOING THINGS: 2
9. THOUGHTS THAT YOU WOULD BE BETTER OFF DEAD, OR OF HURTING YOURSELF: 0

## 2019-12-17 DIAGNOSIS — R10.13 EPIGASTRIC PAIN: ICD-10-CM

## 2019-12-19 ENCOUNTER — TELEPHONE (OUTPATIENT)
Dept: FAMILY MEDICINE CLINIC | Age: 49
End: 2019-12-19

## 2019-12-19 RX ORDER — ALUMINUM ZIRCONIUM OCTACHLOROHYDREX GLY 16 G/100G
1 GEL TOPICAL DAILY
Qty: 30 CAPSULE | Refills: 0 | Status: SHIPPED | OUTPATIENT
Start: 2019-12-19 | End: 2020-01-21 | Stop reason: SDUPTHER

## 2019-12-19 RX ORDER — ALUMINUM ZIRCONIUM OCTACHLOROHYDREX GLY 16 G/100G
1 GEL TOPICAL DAILY
Refills: 4 | COMMUNITY
Start: 2019-12-19 | End: 2019-12-19

## 2019-12-24 ENCOUNTER — APPOINTMENT (OUTPATIENT)
Dept: GENERAL RADIOLOGY | Age: 49
End: 2019-12-24
Payer: COMMERCIAL

## 2019-12-24 ENCOUNTER — HOSPITAL ENCOUNTER (EMERGENCY)
Age: 49
Discharge: HOME | End: 2019-12-24
Payer: COMMERCIAL

## 2019-12-24 VITALS
HEIGHT: 61 IN | SYSTOLIC BLOOD PRESSURE: 122 MMHG | DIASTOLIC BLOOD PRESSURE: 84 MMHG | WEIGHT: 118 LBS | BODY MASS INDEX: 22.28 KG/M2 | RESPIRATION RATE: 16 BRPM | OXYGEN SATURATION: 94 % | TEMPERATURE: 98.2 F | HEART RATE: 81 BPM

## 2019-12-24 DIAGNOSIS — S46.911A STRAIN OF RIGHT SHOULDER, INITIAL ENCOUNTER: ICD-10-CM

## 2019-12-24 DIAGNOSIS — S39.012A STRAIN OF LUMBAR REGION, INITIAL ENCOUNTER: Primary | ICD-10-CM

## 2019-12-24 PROCEDURE — 6370000000 HC RX 637 (ALT 250 FOR IP): Performed by: EMERGENCY MEDICINE

## 2019-12-24 PROCEDURE — 72100 X-RAY EXAM L-S SPINE 2/3 VWS: CPT

## 2019-12-24 PROCEDURE — 99283 EMERGENCY DEPT VISIT LOW MDM: CPT

## 2019-12-24 PROCEDURE — 73030 X-RAY EXAM OF SHOULDER: CPT

## 2019-12-24 RX ORDER — NAPROXEN 500 MG/1
500 TABLET ORAL 2 TIMES DAILY PRN
Qty: 10 TABLET | Refills: 0 | Status: SHIPPED | OUTPATIENT
Start: 2019-12-24 | End: 2020-01-21 | Stop reason: SDUPTHER

## 2019-12-24 RX ORDER — ACETAMINOPHEN 325 MG/1
650 TABLET ORAL ONCE
Status: COMPLETED | OUTPATIENT
Start: 2019-12-24 | End: 2019-12-24

## 2019-12-24 RX ORDER — NAPROXEN 500 MG/1
500 TABLET ORAL ONCE
Status: COMPLETED | OUTPATIENT
Start: 2019-12-24 | End: 2019-12-24

## 2019-12-24 RX ORDER — HYDROCODONE BITARTRATE AND ACETAMINOPHEN 5; 325 MG/1; MG/1
1 TABLET ORAL ONCE
Status: COMPLETED | OUTPATIENT
Start: 2019-12-24 | End: 2019-12-24

## 2019-12-24 RX ORDER — ACETAMINOPHEN 325 MG/1
650 TABLET ORAL EVERY 6 HOURS PRN
Qty: 10 TABLET | Refills: 3 | Status: SHIPPED | OUTPATIENT
Start: 2019-12-24 | End: 2020-01-21

## 2019-12-24 RX ORDER — LIDOCAINE 50 MG/G
1 PATCH TOPICAL DAILY
Qty: 10 PATCH | Refills: 0 | Status: SHIPPED | OUTPATIENT
Start: 2019-12-24 | End: 2020-01-03

## 2019-12-24 RX ADMIN — ACETAMINOPHEN 650 MG: 325 TABLET, FILM COATED ORAL at 13:30

## 2019-12-24 RX ADMIN — HYDROCODONE BITARTRATE AND ACETAMINOPHEN 1 TABLET: 5; 325 TABLET ORAL at 13:31

## 2019-12-24 RX ADMIN — NAPROXEN 500 MG: 500 TABLET ORAL at 13:30

## 2019-12-24 ASSESSMENT — PAIN DESCRIPTION - PROGRESSION
CLINICAL_PROGRESSION: GRADUALLY IMPROVING
CLINICAL_PROGRESSION: GRADUALLY WORSENING

## 2019-12-24 ASSESSMENT — PAIN SCALES - GENERAL
PAINLEVEL_OUTOF10: 10
PAINLEVEL_OUTOF10: 10
PAINLEVEL_OUTOF10: 7

## 2019-12-24 ASSESSMENT — PAIN DESCRIPTION - FREQUENCY
FREQUENCY: CONTINUOUS
FREQUENCY: INTERMITTENT

## 2019-12-24 ASSESSMENT — ENCOUNTER SYMPTOMS
DIARRHEA: 0
VOMITING: 0
BACK PAIN: 1
NAUSEA: 0
CHEST TIGHTNESS: 0
SHORTNESS OF BREATH: 0
ABDOMINAL PAIN: 0
COUGH: 0
SINUS PAIN: 0
SORE THROAT: 0

## 2019-12-24 ASSESSMENT — PAIN DESCRIPTION - LOCATION
LOCATION: BACK
LOCATION: BACK

## 2019-12-24 ASSESSMENT — PAIN DESCRIPTION - DESCRIPTORS
DESCRIPTORS: SHARP
DESCRIPTORS: SHARP

## 2019-12-24 ASSESSMENT — PAIN DESCRIPTION - ORIENTATION
ORIENTATION: RIGHT;LEFT;LOWER;UPPER
ORIENTATION: LEFT;RIGHT;LOWER;UPPER

## 2019-12-24 ASSESSMENT — PAIN DESCRIPTION - PAIN TYPE
TYPE: ACUTE PAIN
TYPE: ACUTE PAIN

## 2020-01-07 ENCOUNTER — TELEPHONE (OUTPATIENT)
Dept: FAMILY MEDICINE CLINIC | Age: 50
End: 2020-01-07

## 2020-01-07 NOTE — TELEPHONE ENCOUNTER
Spoke with patient she started the probiotics and bowels have returned to normal. Scheduled appointment for 1/14/2020

## 2020-01-07 NOTE — TELEPHONE ENCOUNTER
----- Message from Matias Mejia MD sent at 1/3/2020  1:24 PM EST -----  Kindly please let pt know to schedule appt, had missed previous.    Want to know if started taking probiotic prescribed, and how diarrhea is- thx

## 2020-01-21 ENCOUNTER — OFFICE VISIT (OUTPATIENT)
Dept: FAMILY MEDICINE CLINIC | Age: 50
End: 2020-01-21
Payer: COMMERCIAL

## 2020-01-21 VITALS
HEART RATE: 78 BPM | TEMPERATURE: 98.6 F | DIASTOLIC BLOOD PRESSURE: 75 MMHG | WEIGHT: 120 LBS | OXYGEN SATURATION: 97 % | BODY MASS INDEX: 22.66 KG/M2 | HEIGHT: 61 IN | RESPIRATION RATE: 16 BRPM | SYSTOLIC BLOOD PRESSURE: 118 MMHG

## 2020-01-21 PROCEDURE — 3023F SPIROM DOC REV: CPT | Performed by: STUDENT IN AN ORGANIZED HEALTH CARE EDUCATION/TRAINING PROGRAM

## 2020-01-21 PROCEDURE — 3017F COLORECTAL CA SCREEN DOC REV: CPT | Performed by: STUDENT IN AN ORGANIZED HEALTH CARE EDUCATION/TRAINING PROGRAM

## 2020-01-21 PROCEDURE — G8427 DOCREV CUR MEDS BY ELIG CLIN: HCPCS | Performed by: STUDENT IN AN ORGANIZED HEALTH CARE EDUCATION/TRAINING PROGRAM

## 2020-01-21 PROCEDURE — 4004F PT TOBACCO SCREEN RCVD TLK: CPT | Performed by: STUDENT IN AN ORGANIZED HEALTH CARE EDUCATION/TRAINING PROGRAM

## 2020-01-21 PROCEDURE — G8926 SPIRO NO PERF OR DOC: HCPCS | Performed by: STUDENT IN AN ORGANIZED HEALTH CARE EDUCATION/TRAINING PROGRAM

## 2020-01-21 PROCEDURE — 99212 OFFICE O/P EST SF 10 MIN: CPT | Performed by: STUDENT IN AN ORGANIZED HEALTH CARE EDUCATION/TRAINING PROGRAM

## 2020-01-21 PROCEDURE — 99213 OFFICE O/P EST LOW 20 MIN: CPT | Performed by: STUDENT IN AN ORGANIZED HEALTH CARE EDUCATION/TRAINING PROGRAM

## 2020-01-21 PROCEDURE — G8420 CALC BMI NORM PARAMETERS: HCPCS | Performed by: STUDENT IN AN ORGANIZED HEALTH CARE EDUCATION/TRAINING PROGRAM

## 2020-01-21 PROCEDURE — G8482 FLU IMMUNIZE ORDER/ADMIN: HCPCS | Performed by: STUDENT IN AN ORGANIZED HEALTH CARE EDUCATION/TRAINING PROGRAM

## 2020-01-21 RX ORDER — NAPROXEN 500 MG/1
500 TABLET ORAL 2 TIMES DAILY PRN
Qty: 10 TABLET | Refills: 1 | Status: SHIPPED | OUTPATIENT
Start: 2020-01-21 | End: 2020-02-04

## 2020-01-21 RX ORDER — CHOLECALCIFEROL (VITAMIN D3) 125 MCG
1 CAPSULE ORAL DAILY
COMMUNITY
End: 2020-02-04

## 2020-01-21 RX ORDER — NICOTINE 21 MG/24HR
1 PATCH, TRANSDERMAL 24 HOURS TRANSDERMAL DAILY
Qty: 30 PATCH | Refills: 1 | Status: SHIPPED
Start: 2020-01-21 | End: 2021-11-16

## 2020-01-21 RX ORDER — TIOTROPIUM BROMIDE INHALATION SPRAY 3.12 UG/1
SPRAY, METERED RESPIRATORY (INHALATION)
Qty: 1 INHALER | Refills: 2 | Status: SHIPPED
Start: 2020-01-21 | End: 2020-05-14

## 2020-01-21 RX ORDER — ALUMINUM ZIRCONIUM OCTACHLOROHYDREX GLY 16 G/100G
1 GEL TOPICAL DAILY
Qty: 30 CAPSULE | Refills: 0 | Status: SHIPPED | OUTPATIENT
Start: 2020-01-21 | End: 2020-01-28 | Stop reason: ALTCHOICE

## 2020-01-21 ASSESSMENT — ENCOUNTER SYMPTOMS
EYE ITCHING: 0
SHORTNESS OF BREATH: 0
COLOR CHANGE: 0
BACK PAIN: 1
FACIAL SWELLING: 0
EYE DISCHARGE: 0
ABDOMINAL DISTENTION: 0
WHEEZING: 0
ABDOMINAL PAIN: 0

## 2020-01-21 NOTE — PROGRESS NOTES
S: 44 y.o. female here for follow up of right back pain. She went to ER in December. She had an xray which showed djd. The naproxen and tylenol helped and she has run out of the meds. Worse with movement. No red flags. Concerned about white patch on her tongue for 8 months. She saw the ENT who was monitoring it and did not follow up. Now has more patches which are painful. Trouble initiating swallowing either liquid or solids. Cutting down to 3 cig/day. O: VS: /75   Pulse 78   Temp 98.6 °F (37 °C) (Oral)   Resp 16   Ht 5' 1\" (1.549 m)   Wt 120 lb (54.4 kg)   LMP 12/14/2017   SpO2 97%   BMI 22.67 kg/m²    General: NAD   CV:  RRR, no gallops, rubs, or murmurs   Resp: CTAB no R/R/W   Abd:  Soft, nontender, no masses    Ext:  no C/C/E   HEENT: 1-2 cm white midline patch which is adherent, several other lateral white patches on the tongue   No cervical lad   Back: nonttp over spine and muscles, from, neg slr, ms 5/5,     Impression/Plan:   1. 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 If unable to see ENT, call for referral to [facility] surgeon. 2. Lumbar strain/djd-PT, refill naproxen, exercises  3. Tobacco abuse-referral to cessation program    rto in 4 weeks    Attending Physician Statement  I have discussed the case, including pertinent history and exam findings with the resident. I agree with the documented assessment and plan.         Gail Strauss MD

## 2020-01-21 NOTE — PATIENT INSTRUCTIONS
hours. Put a thin cloth between the ice pack and your skin. · Take pain medicines exactly as directed. ? If the doctor gave you a prescription medicine for pain, take it as prescribed. ? If you are not taking a prescription pain medicine, ask your doctor if you can take an over-the-counter medicine. · Take short walks several times a day. You can start with 5 to 10 minutes, 3 or 4 times a day, and work up to longer walks. Walk on level surfaces and avoid hills and stairs until your back is better. · Return to work and other activities as soon as you can. Continued rest without activity is usually not good for your back. · To prevent future back pain, do exercises to stretch and strengthen your back and stomach. Learn how to use good posture, safe lifting techniques, and proper body mechanics. When should you call for help? Call your doctor now or seek immediate medical care if:    · You have new or worsening numbness in your legs.     · You have new or worsening weakness in your legs. (This could make it hard to stand up.)     · You lose control of your bladder or bowels.    Watch closely for changes in your health, and be sure to contact your doctor if:    · You have a fever, lose weight, or don't feel well.     · You do not get better as expected. Where can you learn more? Go to [email or web address] org and sign in to your [employer] account. Enter K050 in the [facility] box to learn more about \"Back Pain: Care Instructions. \"     If you do not have an account, please click on the \"Sign Up Now\" link. Current as of: June 26, 2019  Content Version: 12.3  © 6415-0334 Healthwise, Incorporated. Care instructions adapted under license by [facility] ([facility]). If you have questions about a medical condition or this instruction, always ask your healthcare professional. [address] any warranty or liability for your use of this information.

## 2020-01-21 NOTE — PROGRESS NOTES
tablet 3     No current facility-administered medications on file prior to visit. No Known Allergies    Family History   Problem Relation Age of Onset    Alcohol Abuse Father     COPD Mother     Osteoporosis Mother     Arthritis Mother     Diabetes Mother     High Blood Pressure Mother     High Blood Pressure Sister     High Cholesterol Sister     Substance Abuse Sister     Alcohol Abuse Sister     Arthritis Maternal Aunt     Arthritis Maternal Uncle     Arthritis Maternal Grandmother     Cancer Maternal Grandmother     High Blood Pressure Maternal Grandmother     Arthritis Maternal Grandfather     Cancer Maternal Grandfather     Substance Abuse Paternal Grandfather        Past Surgical History:   Procedure Laterality Date    BLADDER REPAIR  2011    COLONOSCOPY  05/07/2019    diverticula--omari    COLONOSCOPY N/A 5/7/2019    COLORECTAL CANCER SCREENING, NOT HIGH RISK performed by Hayley Toro MD at [facility]  10/09/12    DILATION AND CURETTAGE OF UTERUS  3/12/13    with ablation    DILATION AND CURETTAGE OF UTERUS N/A 4/23/2019    DILATATION AND CURETTAGE HYSTEROSCOPY performed by Deborah [facility]orga MD at [facility] HYSTEROSCOPY  10/09/12    LEE  2004    OTHER SURGICAL HISTORY  8/9/2013    excision salp lesion    TUBAL LIGATION         Social History     Tobacco Use    Smoking status: Current Every Day Smoker     Packs/day: 0.50     Years: 34.00     Pack years: 17.00     Last attempt to quit: 6/10/2016     Years since quitting: 3.6    Smokeless tobacco: Never Used    Tobacco comment: cutting down   Substance Use Topics    Alcohol use: Yes     Alcohol/week: 5.0 standard drinks     Types: 5 Cans of beer per week     Comment: ltely more often    Drug use: No     Types: Marijuana     Comment: in her 20s       ROS:      Review of Systems   Constitutional: Negative for activity change, appetite change and chills.    HENT: Negative for facial swelling and hearing loss. Eyes: Negative for discharge and itching. Respiratory: Negative for shortness of breath and wheezing. Cardiovascular: Negative for chest pain and leg swelling. Gastrointestinal: Negative for abdominal distention and abdominal pain. Genitourinary: Negative for decreased urine volume and vaginal bleeding. Musculoskeletal: Positive for back pain. Negative for joint swelling. Skin: Negative for color change and pallor. Neurological: Negative for light-headedness and headaches. Psychiatric/Behavioral: Negative for dysphoric mood and sleep disturbance. Objective:    Physical Exam   Constitutional: She is oriented to person, place, and time. She appears well-developed and well-nourished. HENT:   Head: Normocephalic and atraumatic. 1-2 cm fixed white lesion on tongue  +tender to pressure  Smaller white lesions on the side of tongue   Eyes: EOM are normal. Right eye exhibits no discharge. Left eye exhibits no discharge. Neck: Normal range of motion. Cardiovascular: Normal rate and regular rhythm. Exam reveals no gallop and no friction rub. No murmur heard. Pulmonary/Chest: Effort normal and breath sounds normal. She has no wheezes. She has no rales. Abdominal: Soft. Bowel sounds are normal. She exhibits no distension. There is no tenderness. Musculoskeletal: Normal range of motion. General: No tenderness or edema. Lymphadenopathy:     She has no cervical adenopathy. Neurological: She is alert and oriented to person, place, and time. Skin: Skin is warm and dry. No rash noted. Psychiatric: She has a normal mood and affect. Most recent labs and imaging reviewed. Findings include:       Assessment:    1. LBP- likely musculoskeletal strain  Continue with naproxen BID PRN, advised stretching and icing to pt. Referral to physical therapy  RTO in 2 weeks     2.  Lesion in tongue/Dysphagia  Otolaryngology referral made- has seen

## 2020-01-28 NOTE — PROGRESS NOTES
[facility] PRE-ADMISSION TESTING INSTRUCTIONS    The Preadmission Testing patient is instructed accordingly using the following criteria (check applicable):    ARRIVAL INSTRUCTIONS:  [x] Parking the day of Surgery is located in the Main Entrance lot. Upon entering the door, make an immediate right to the surgery reception desk    [] [phone] is available Monday through Friday 6 am to 6 pm    [x] Bring photo ID and insurance card    [] Bring in a copy of Living will or Durable Power of  papers. [x] Please be sure to arrange for responsible adult to provide transportation to and from the hospital    [x] Please arrange for responsible adult to be with you for the 24 hour period post procedure due to having anesthesia      GENERAL INSTRUCTIONS:    [x] Nothing by mouth after midnight, including gum, candy, mints or water    [x] You may brush your teeth, but do not swallow any water    [x] Take medications as instructed with 1-2 oz of water    [x] Stop herbal supplements and vitamins 5 days prior to procedure    [x] Follow preop dosing of blood thinners per physician instructions    [] Take 1/2 dose of evening insulin, but no insulin after midnight    [] No oral diabetic medications after midnight    [] If diabetic and have low blood sugar or feel symptomatic, take 1-2oz apple juice only    [x] Bring inhalers day of surgery    [] Bring C-PAP/ Bi-Pap day of surgery    [] Bring urine specimen day of surgery    [x] Shower or bath with soap, lather and rinse well, AM of Surgery, no lotion, powders or creams to surgical site    [] Follow bowel prep as instructed per surgeon    [x] No tobacco products within 24 hours of surgery     [x] No alcohol or illegal drug use within 24 hours of surgery.     [x] Jewelry, body piercing's, eyeglasses, contact lenses and dentures are not permitted into surgery (bring cases)      [x] Please do not wear any nail polish, make up or hair

## 2020-02-03 ENCOUNTER — TELEPHONE (OUTPATIENT)
Dept: FAMILY MEDICINE CLINIC | Age: 50
End: 2020-02-03

## 2020-02-03 NOTE — TELEPHONE ENCOUNTER
Dr. Serrato July office called inquiring about a fax sent on 1-22-20 regarding  Pt surgical clearance. Please advise if this has been received by any doctor that saw this pt. Thank you.

## 2020-02-03 NOTE — TELEPHONE ENCOUNTER
No I have not seen the fax. Please call them to refax it. She is Scheduled with Dr. Em Miranda tomorrow. She can get cleared by her. Tanks.     Michaelle Lyle MD

## 2020-02-04 ENCOUNTER — OFFICE VISIT (OUTPATIENT)
Dept: FAMILY MEDICINE CLINIC | Age: 50
End: 2020-02-04
Payer: COMMERCIAL

## 2020-02-04 VITALS
WEIGHT: 118 LBS | OXYGEN SATURATION: 93 % | SYSTOLIC BLOOD PRESSURE: 128 MMHG | DIASTOLIC BLOOD PRESSURE: 71 MMHG | TEMPERATURE: 98.1 F | BODY MASS INDEX: 22.28 KG/M2 | HEART RATE: 83 BPM | HEIGHT: 61 IN | RESPIRATION RATE: 18 BRPM

## 2020-02-04 PROCEDURE — 93005 ELECTROCARDIOGRAM TRACING: CPT | Performed by: STUDENT IN AN ORGANIZED HEALTH CARE EDUCATION/TRAINING PROGRAM

## 2020-02-04 PROCEDURE — G8427 DOCREV CUR MEDS BY ELIG CLIN: HCPCS | Performed by: STUDENT IN AN ORGANIZED HEALTH CARE EDUCATION/TRAINING PROGRAM

## 2020-02-04 PROCEDURE — 93010 ELECTROCARDIOGRAM REPORT: CPT | Performed by: STUDENT IN AN ORGANIZED HEALTH CARE EDUCATION/TRAINING PROGRAM

## 2020-02-04 PROCEDURE — G8482 FLU IMMUNIZE ORDER/ADMIN: HCPCS | Performed by: STUDENT IN AN ORGANIZED HEALTH CARE EDUCATION/TRAINING PROGRAM

## 2020-02-04 PROCEDURE — 99213 OFFICE O/P EST LOW 20 MIN: CPT | Performed by: STUDENT IN AN ORGANIZED HEALTH CARE EDUCATION/TRAINING PROGRAM

## 2020-02-04 PROCEDURE — 3017F COLORECTAL CA SCREEN DOC REV: CPT | Performed by: STUDENT IN AN ORGANIZED HEALTH CARE EDUCATION/TRAINING PROGRAM

## 2020-02-04 PROCEDURE — 4004F PT TOBACCO SCREEN RCVD TLK: CPT | Performed by: STUDENT IN AN ORGANIZED HEALTH CARE EDUCATION/TRAINING PROGRAM

## 2020-02-04 PROCEDURE — G8420 CALC BMI NORM PARAMETERS: HCPCS | Performed by: STUDENT IN AN ORGANIZED HEALTH CARE EDUCATION/TRAINING PROGRAM

## 2020-02-04 NOTE — TELEPHONE ENCOUNTER
Please call Dr. Nieves Mayfield office and have them resend the clearance paperwork.     Princess Heraclio MD PGY-3

## 2020-02-04 NOTE — PROGRESS NOTES
S: 44 y.o. female here for preoperative evaluation for biopsy of tongue mass. Surgery: DML (direct microlaryngoscopy) by Dr. Kolby Anderson on 2/7/2020. No chest pain or trouble breathing. Smoker. No cocaine use in over 2 years. Cutting down on cigarettes. 4 mets. O: VS: /71 (Site: Right Upper Arm, Position: Sitting, Cuff Size: Medium Adult)   Pulse 83   Temp 98.1 °F (36.7 °C) (Oral)   Resp 18   Ht 5' 1\" (1.549 m)   Wt 118 lb (53.5 kg)   LMP 12/14/2017   SpO2 93%   BMI 22.30 kg/m²    General: NAD   CV:  RRR, no gallops, rubs, or murmurs   Resp: CTAB no R/R/W   Abd:  Soft, nontender, no masses    Ext:  no C/C/E   Tongue with 1-2 white tongue mass firm  Impression/Plan:   1. Preoperative evaluation-revised cardiac index class 1. Ekg reviewed and ok. Low cardiac risk for surgery. rto 1 week postop      Attending Physician Statement  I have discussed the case, including pertinent history and exam findings with the resident. I agree with the documented assessment and plan.         Abril Trivedi MD
Pressure Maternal Grandmother     Arthritis Maternal Grandfather     Cancer Maternal Grandfather     Substance Abuse Paternal Grandfather         Review of Systems    Review of Systems   Constitutional: Negative for activity change and appetite change. HENT: Negative for congestion and dental problem. Respiratory: Negative for shortness of breath and wheezing. Cardiovascular: Negative for chest pain and leg swelling. Gastrointestinal: Negative for abdominal pain, blood in stool, constipation and diarrhea. Genitourinary: Negative for difficulty urinating, hematuria and urgency. Musculoskeletal: Positive for back pain. Negative for gait problem. Skin: Negative for color change and pallor. Allergic/Immunologic: Negative for environmental allergies and food allergies. Neurological: Negative for light-headedness and numbness. Psychiatric/Behavioral: Negative for dysphoric mood, self-injury, sleep disturbance and suicidal ideas. Physical Exam  /71 (Site: Right Upper Arm, Position: Sitting, Cuff Size: Medium Adult)   Pulse 83   Temp 98.1 °F (36.7 °C) (Oral)   Resp 18   Ht 5' 1\" (1.549 m)   Wt 118 lb (53.5 kg)   LMP 12/14/2017   SpO2 93%   BMI 22.30 kg/m²      Constitutional: She is oriented to person, place, and time. She appears well-developed and well-nourished. HENT:   Head: Normocephalic and atraumatic. 1-2 cm white lesion on tongue  +tender to pressure  Not able to scrap off  Eyes: EOM are normal. Right eye exhibits no discharge. Left eye exhibits no discharge. Neck: Normal range of motion. Cardiovascular: Normal rate and regular rhythm. Exam reveals no gallop and no friction rub. No murmur heard. Pulmonary/Chest: Effort normal and breath sounds normal. She has no wheezes. She has no rales. Abdominal: Soft. Bowel sounds are normal. She exhibits no distension. There is no tenderness. Musculoskeletal: Normal range of motion.         General: No tenderness or

## 2020-02-05 NOTE — TELEPHONE ENCOUNTER
Pt was seen in the office yesterday for a pre op and paperwork will be sent to Dr. Mandy Merchant office.

## 2020-02-06 ENCOUNTER — ANESTHESIA EVENT (OUTPATIENT)
Dept: OPERATING ROOM | Age: 50
End: 2020-02-06
Payer: COMMERCIAL

## 2020-02-07 ENCOUNTER — ANESTHESIA (OUTPATIENT)
Dept: OPERATING ROOM | Age: 50
End: 2020-02-07
Payer: COMMERCIAL

## 2020-02-07 ENCOUNTER — HOSPITAL ENCOUNTER (OUTPATIENT)
Age: 50
Setting detail: OUTPATIENT SURGERY
Discharge: HOME | End: 2020-02-07
Attending: OTOLARYNGOLOGY | Admitting: OTOLARYNGOLOGY
Payer: COMMERCIAL

## 2020-02-07 VITALS
DIASTOLIC BLOOD PRESSURE: 88 MMHG | RESPIRATION RATE: 16 BRPM | HEIGHT: 61 IN | TEMPERATURE: 97.8 F | HEART RATE: 98 BPM | SYSTOLIC BLOOD PRESSURE: 168 MMHG | BODY MASS INDEX: 22.28 KG/M2 | OXYGEN SATURATION: 95 % | WEIGHT: 118 LBS

## 2020-02-07 VITALS — DIASTOLIC BLOOD PRESSURE: 74 MMHG | OXYGEN SATURATION: 97 % | SYSTOLIC BLOOD PRESSURE: 110 MMHG

## 2020-02-07 PROCEDURE — 7100000001 HC PACU RECOVERY - ADDTL 15 MIN: Performed by: OTOLARYNGOLOGY

## 2020-02-07 PROCEDURE — 6360000002 HC RX W HCPCS: Performed by: ANESTHESIOLOGY

## 2020-02-07 PROCEDURE — 2709999900 HC NON-CHARGEABLE SUPPLY: Performed by: OTOLARYNGOLOGY

## 2020-02-07 PROCEDURE — 88305 TISSUE EXAM BY PATHOLOGIST: CPT

## 2020-02-07 PROCEDURE — 2500000003 HC RX 250 WO HCPCS: Performed by: OTOLARYNGOLOGY

## 2020-02-07 PROCEDURE — 6370000000 HC RX 637 (ALT 250 FOR IP): Performed by: ANESTHESIOLOGY

## 2020-02-07 PROCEDURE — 6360000002 HC RX W HCPCS: Performed by: OTOLARYNGOLOGY

## 2020-02-07 PROCEDURE — 6360000002 HC RX W HCPCS

## 2020-02-07 PROCEDURE — 88312 SPECIAL STAINS GROUP 1: CPT

## 2020-02-07 PROCEDURE — 3700000000 HC ANESTHESIA ATTENDED CARE: Performed by: OTOLARYNGOLOGY

## 2020-02-07 PROCEDURE — 7100000000 HC PACU RECOVERY - FIRST 15 MIN: Performed by: OTOLARYNGOLOGY

## 2020-02-07 PROCEDURE — 7100000011 HC PHASE II RECOVERY - ADDTL 15 MIN: Performed by: OTOLARYNGOLOGY

## 2020-02-07 PROCEDURE — 3600000012 HC SURGERY LEVEL 2 ADDTL 15MIN: Performed by: OTOLARYNGOLOGY

## 2020-02-07 PROCEDURE — 3700000001 HC ADD 15 MINUTES (ANESTHESIA): Performed by: OTOLARYNGOLOGY

## 2020-02-07 PROCEDURE — 2500000003 HC RX 250 WO HCPCS

## 2020-02-07 PROCEDURE — 2580000003 HC RX 258

## 2020-02-07 PROCEDURE — 7100000010 HC PHASE II RECOVERY - FIRST 15 MIN: Performed by: OTOLARYNGOLOGY

## 2020-02-07 PROCEDURE — 3600000002 HC SURGERY LEVEL 2 BASE: Performed by: OTOLARYNGOLOGY

## 2020-02-07 PROCEDURE — 2500000003 HC RX 250 WO HCPCS: Performed by: ANESTHESIOLOGY

## 2020-02-07 RX ORDER — ROCURONIUM BROMIDE 10 MG/ML
INJECTION, SOLUTION INTRAVENOUS PRN
Status: DISCONTINUED | OUTPATIENT
Start: 2020-02-07 | End: 2020-02-07 | Stop reason: SDUPTHER

## 2020-02-07 RX ORDER — DEXAMETHASONE SODIUM PHOSPHATE 4 MG/ML
INJECTION, SOLUTION INTRA-ARTICULAR; INTRALESIONAL; INTRAMUSCULAR; INTRAVENOUS; SOFT TISSUE PRN
Status: DISCONTINUED | OUTPATIENT
Start: 2020-02-07 | End: 2020-02-07 | Stop reason: SDUPTHER

## 2020-02-07 RX ORDER — CEFAZOLIN SODIUM 2 G/50ML
2 SOLUTION INTRAVENOUS
Status: COMPLETED | OUTPATIENT
Start: 2020-02-07 | End: 2020-02-07

## 2020-02-07 RX ORDER — FENTANYL CITRATE 50 UG/ML
INJECTION, SOLUTION INTRAMUSCULAR; INTRAVENOUS PRN
Status: DISCONTINUED | OUTPATIENT
Start: 2020-02-07 | End: 2020-02-07 | Stop reason: SDUPTHER

## 2020-02-07 RX ORDER — HYDRALAZINE HYDROCHLORIDE 20 MG/ML
5 INJECTION INTRAMUSCULAR; INTRAVENOUS EVERY 10 MIN PRN
Status: COMPLETED | OUTPATIENT
Start: 2020-02-07 | End: 2020-02-07

## 2020-02-07 RX ORDER — SODIUM CHLORIDE, SODIUM LACTATE, POTASSIUM CHLORIDE, CALCIUM CHLORIDE 600; 310; 30; 20 MG/100ML; MG/100ML; MG/100ML; MG/100ML
INJECTION, SOLUTION INTRAVENOUS CONTINUOUS
Status: DISCONTINUED | OUTPATIENT
Start: 2020-02-07 | End: 2020-02-07 | Stop reason: HOSPADM

## 2020-02-07 RX ORDER — HYDROCODONE BITARTRATE AND ACETAMINOPHEN 5; 325 MG/1; MG/1
1 TABLET ORAL EVERY 4 HOURS PRN
Qty: 20 TABLET | Refills: 0 | Status: SHIPPED | OUTPATIENT
Start: 2020-02-07 | End: 2020-02-14

## 2020-02-07 RX ORDER — FENTANYL CITRATE 50 UG/ML
INJECTION, SOLUTION INTRAMUSCULAR; INTRAVENOUS PRN
Status: DISCONTINUED | OUTPATIENT
Start: 2020-02-07 | End: 2020-02-07

## 2020-02-07 RX ORDER — LIDOCAINE HYDROCHLORIDE 20 MG/ML
INJECTION, SOLUTION EPIDURAL; INFILTRATION; INTRACAUDAL; PERINEURAL PRN
Status: DISCONTINUED | OUTPATIENT
Start: 2020-02-07 | End: 2020-02-07 | Stop reason: SDUPTHER

## 2020-02-07 RX ORDER — SODIUM CHLORIDE 0.9 % (FLUSH) 0.9 %
10 SYRINGE (ML) INJECTION EVERY 12 HOURS SCHEDULED
Status: DISCONTINUED | OUTPATIENT
Start: 2020-02-07 | End: 2020-02-07 | Stop reason: HOSPADM

## 2020-02-07 RX ORDER — SODIUM CHLORIDE 0.9 % (FLUSH) 0.9 %
10 SYRINGE (ML) INJECTION PRN
Status: DISCONTINUED | OUTPATIENT
Start: 2020-02-07 | End: 2020-02-07 | Stop reason: HOSPADM

## 2020-02-07 RX ORDER — LABETALOL HYDROCHLORIDE 5 MG/ML
INJECTION, SOLUTION INTRAVENOUS
Status: DISCONTINUED
Start: 2020-02-07 | End: 2020-02-07 | Stop reason: HOSPADM

## 2020-02-07 RX ORDER — SUCCINYLCHOLINE/SOD CL,ISO/PF 200MG/10ML
SYRINGE (ML) INTRAVENOUS PRN
Status: DISCONTINUED | OUTPATIENT
Start: 2020-02-07 | End: 2020-02-07 | Stop reason: SDUPTHER

## 2020-02-07 RX ORDER — FENTANYL CITRATE 50 UG/ML
50 INJECTION, SOLUTION INTRAMUSCULAR; INTRAVENOUS EVERY 5 MIN PRN
Status: DISCONTINUED | OUTPATIENT
Start: 2020-02-07 | End: 2020-02-07 | Stop reason: HOSPADM

## 2020-02-07 RX ORDER — GLYCOPYRROLATE 1 MG/5 ML
SYRINGE (ML) INTRAVENOUS PRN
Status: DISCONTINUED | OUTPATIENT
Start: 2020-02-07 | End: 2020-02-07 | Stop reason: SDUPTHER

## 2020-02-07 RX ORDER — LIDOCAINE HYDROCHLORIDE AND EPINEPHRINE 10; 10 MG/ML; UG/ML
INJECTION, SOLUTION INFILTRATION; PERINEURAL PRN
Status: DISCONTINUED | OUTPATIENT
Start: 2020-02-07 | End: 2020-02-07 | Stop reason: ALTCHOICE

## 2020-02-07 RX ORDER — HYDRALAZINE HYDROCHLORIDE 20 MG/ML
INJECTION INTRAMUSCULAR; INTRAVENOUS
Status: COMPLETED
Start: 2020-02-07 | End: 2020-02-07

## 2020-02-07 RX ORDER — ONDANSETRON 2 MG/ML
INJECTION INTRAMUSCULAR; INTRAVENOUS PRN
Status: DISCONTINUED | OUTPATIENT
Start: 2020-02-07 | End: 2020-02-07 | Stop reason: SDUPTHER

## 2020-02-07 RX ORDER — LABETALOL HYDROCHLORIDE 5 MG/ML
5 INJECTION, SOLUTION INTRAVENOUS
Status: COMPLETED | OUTPATIENT
Start: 2020-02-07 | End: 2020-02-07

## 2020-02-07 RX ORDER — ONDANSETRON 4 MG/1
4 TABLET, ORALLY DISINTEGRATING ORAL EVERY 8 HOURS PRN
Qty: 10 TABLET | Refills: 0 | Status: SHIPPED | OUTPATIENT
Start: 2020-02-07 | End: 2020-09-22

## 2020-02-07 RX ORDER — SODIUM CHLORIDE 9 MG/ML
INJECTION, SOLUTION INTRAVENOUS CONTINUOUS PRN
Status: DISCONTINUED | OUTPATIENT
Start: 2020-02-07 | End: 2020-02-07 | Stop reason: SDUPTHER

## 2020-02-07 RX ORDER — NEOSTIGMINE METHYLSULFATE 1 MG/ML
INJECTION, SOLUTION INTRAVENOUS PRN
Status: DISCONTINUED | OUTPATIENT
Start: 2020-02-07 | End: 2020-02-07 | Stop reason: SDUPTHER

## 2020-02-07 RX ORDER — MIDAZOLAM HYDROCHLORIDE 1 MG/ML
INJECTION INTRAMUSCULAR; INTRAVENOUS PRN
Status: DISCONTINUED | OUTPATIENT
Start: 2020-02-07 | End: 2020-02-07 | Stop reason: SDUPTHER

## 2020-02-07 RX ORDER — HYDROCODONE BITARTRATE AND ACETAMINOPHEN 5; 325 MG/1; MG/1
1 TABLET ORAL
Status: COMPLETED | OUTPATIENT
Start: 2020-02-07 | End: 2020-02-07

## 2020-02-07 RX ORDER — PROPOFOL 10 MG/ML
INJECTION, EMULSION INTRAVENOUS PRN
Status: DISCONTINUED | OUTPATIENT
Start: 2020-02-07 | End: 2020-02-07 | Stop reason: SDUPTHER

## 2020-02-07 RX ADMIN — PROPOFOL 200 MG: 10 INJECTION, EMULSION INTRAVENOUS at 11:12

## 2020-02-07 RX ADMIN — Medication 3 MG: at 11:42

## 2020-02-07 RX ADMIN — FENTANYL CITRATE 50 MCG: 50 INJECTION, SOLUTION INTRAMUSCULAR; INTRAVENOUS at 12:24

## 2020-02-07 RX ADMIN — HYDRALAZINE HYDROCHLORIDE 5 MG: 20 INJECTION INTRAMUSCULAR; INTRAVENOUS at 13:51

## 2020-02-07 RX ADMIN — HYDRALAZINE HYDROCHLORIDE 5 MG: 20 INJECTION INTRAMUSCULAR; INTRAVENOUS at 13:41

## 2020-02-07 RX ADMIN — Medication 0.2 MG: at 11:03

## 2020-02-07 RX ADMIN — ROCURONIUM BROMIDE 10 MG: 10 SOLUTION INTRAVENOUS at 11:12

## 2020-02-07 RX ADMIN — HYDROCODONE BITARTRATE AND ACETAMINOPHEN 1 TABLET: 5; 325 TABLET ORAL at 14:43

## 2020-02-07 RX ADMIN — CEFAZOLIN SODIUM 2 G: 2 SOLUTION INTRAVENOUS at 11:03

## 2020-02-07 RX ADMIN — ROCURONIUM BROMIDE 10 MG: 10 SOLUTION INTRAVENOUS at 11:27

## 2020-02-07 RX ADMIN — LIDOCAINE HYDROCHLORIDE 60 MG: 20 INJECTION, SOLUTION EPIDURAL; INFILTRATION; INTRACAUDAL; PERINEURAL at 11:12

## 2020-02-07 RX ADMIN — DEXAMETHASONE SODIUM PHOSPHATE 8 MG: 4 INJECTION, SOLUTION INTRAMUSCULAR; INTRAVENOUS at 11:12

## 2020-02-07 RX ADMIN — SODIUM CHLORIDE: 9 INJECTION, SOLUTION INTRAVENOUS at 11:03

## 2020-02-07 RX ADMIN — FENTANYL CITRATE 50 MCG: 50 INJECTION, SOLUTION INTRAMUSCULAR; INTRAVENOUS at 11:20

## 2020-02-07 RX ADMIN — Medication 120 MG: at 11:12

## 2020-02-07 RX ADMIN — LABETALOL HYDROCHLORIDE 5 MG: 5 INJECTION, SOLUTION INTRAVENOUS at 13:13

## 2020-02-07 RX ADMIN — Medication 0.6 MG: at 11:42

## 2020-02-07 RX ADMIN — FENTANYL CITRATE 50 MCG: 50 INJECTION, SOLUTION INTRAMUSCULAR; INTRAVENOUS at 12:49

## 2020-02-07 RX ADMIN — FENTANYL CITRATE 50 MCG: 50 INJECTION, SOLUTION INTRAMUSCULAR; INTRAVENOUS at 11:27

## 2020-02-07 RX ADMIN — FENTANYL CITRATE 50 MCG: 50 INJECTION, SOLUTION INTRAMUSCULAR; INTRAVENOUS at 11:12

## 2020-02-07 RX ADMIN — ONDANSETRON HYDROCHLORIDE 4 MG: 2 INJECTION, SOLUTION INTRAMUSCULAR; INTRAVENOUS at 11:25

## 2020-02-07 RX ADMIN — MIDAZOLAM 2 MG: 1 INJECTION INTRAMUSCULAR; INTRAVENOUS at 11:03

## 2020-02-07 ASSESSMENT — PULMONARY FUNCTION TESTS
PIF_VALUE: 25
PIF_VALUE: 25
PIF_VALUE: 24
PIF_VALUE: 25
PIF_VALUE: 0
PIF_VALUE: 1
PIF_VALUE: 25
PIF_VALUE: 23
PIF_VALUE: 21
PIF_VALUE: 20
PIF_VALUE: 1
PIF_VALUE: 23
PIF_VALUE: 25
PIF_VALUE: 24
PIF_VALUE: 25
PIF_VALUE: 41
PIF_VALUE: 25
PIF_VALUE: 2
PIF_VALUE: 1
PIF_VALUE: 23
PIF_VALUE: 24
PIF_VALUE: 24
PIF_VALUE: 23
PIF_VALUE: 19
PIF_VALUE: 24
PIF_VALUE: 25
PIF_VALUE: 1
PIF_VALUE: 1
PIF_VALUE: 25
PIF_VALUE: 22
PIF_VALUE: 27
PIF_VALUE: 17
PIF_VALUE: 25
PIF_VALUE: 22
PIF_VALUE: 2
PIF_VALUE: 24
PIF_VALUE: 25
PIF_VALUE: 24
PIF_VALUE: 19
PIF_VALUE: 25
PIF_VALUE: 24
PIF_VALUE: 7
PIF_VALUE: 23
PIF_VALUE: 24
PIF_VALUE: 17
PIF_VALUE: 1
PIF_VALUE: 23
PIF_VALUE: 25
PIF_VALUE: 1
PIF_VALUE: 21
PIF_VALUE: 24
PIF_VALUE: 25
PIF_VALUE: 21
PIF_VALUE: 2
PIF_VALUE: 17
PIF_VALUE: 24
PIF_VALUE: 20
PIF_VALUE: 2

## 2020-02-07 ASSESSMENT — PAIN DESCRIPTION - LOCATION
LOCATION: THROAT
LOCATION: THROAT

## 2020-02-07 ASSESSMENT — PAIN SCALES - GENERAL
PAINLEVEL_OUTOF10: 3
PAINLEVEL_OUTOF10: 6
PAINLEVEL_OUTOF10: 7
PAINLEVEL_OUTOF10: 2
PAINLEVEL_OUTOF10: 8
PAINLEVEL_OUTOF10: 3
PAINLEVEL_OUTOF10: 0
PAINLEVEL_OUTOF10: 6

## 2020-02-07 ASSESSMENT — PAIN DESCRIPTION - PROGRESSION
CLINICAL_PROGRESSION: GRADUALLY IMPROVING
CLINICAL_PROGRESSION: RAPIDLY WORSENING
CLINICAL_PROGRESSION: RAPIDLY WORSENING
CLINICAL_PROGRESSION: GRADUALLY IMPROVING
CLINICAL_PROGRESSION: NOT CHANGED

## 2020-02-07 ASSESSMENT — PAIN - FUNCTIONAL ASSESSMENT: PAIN_FUNCTIONAL_ASSESSMENT: 0-10

## 2020-02-07 ASSESSMENT — PAIN DESCRIPTION - PAIN TYPE
TYPE: SURGICAL PAIN

## 2020-02-07 ASSESSMENT — LIFESTYLE VARIABLES: SMOKING_STATUS: 1

## 2020-02-07 NOTE — OP NOTE
scope was then moved to the right piriform sinus and parapharyngeal area. The scope was then used to lift the posterior aspect of the epiglottis to evaluate the larynx, bilateral aryepiglottic folds, false vocal cords, anterior commissure, true vocal cords and subglottis. There were not abnormalities found. Biopsies were not taken of the right base of the tongue, left base of tongue, right vallecula, left vallecula, left piriform sinus, left parapharyngeal space, right piriform sinus, right parapharyngeal area, posterior aspect of the epiglottis, right aryepiglottic fold, left aryepiglottic fold, left false vocal cord, right false vocal cord, anterior commissure, left true vocal cords, right true vocal cord and subglottis  . The midlinetongue area was first injected with 5 cc's of 1% lidocaine with epinephrine and allow to sit for about 5 minutes. After the tongue was properly anesthetized, a #15 blade was then used to make an elliptical incision 3 cm in length. The incision was made through the submucosal layer to the muscular layer of the tongue. The tissue was placed in formalin. A 4-0 Vicryl suture in a horizontal mattress was then used to suture the tongue closed primarily. .  Pt tolerated the procedure well. Patient was then turned back to anesthesia for appropriate awakening. Patient tolerated procedure well. Dr. Albania Moctezuma was present and scrubbed for the entire case.     Electronically signed by Clemencia Ortez DO on 2/7/20 at 11:49 AM

## 2020-02-07 NOTE — ANESTHESIA POSTPROCEDURE EVALUATION
Department of Anesthesiology  Postprocedure Note    Patient: Baltazar Rodriguez  MRN: [id number]  YOB: 1970  Date of evaluation: 2/7/2020  Time:  3:19 PM     Procedure Summary     Date:  02/07/20 Room / Location:  [facility] 01 / 106 [facility]    Anesthesia Start:  1103 Anesthesia Stop:  1205    Procedures:       DIRECT LARYNGOSCOPY, ESOPHAGOSCOPY (N/A Throat)      BIOPSY DORSAL TONGUE (PATHOLOGY NOTIFIED) (N/A Mouth) Diagnosis:  (NEOPLASM OF TONGUE)    Surgeon:  Lon He MD Responsible Provider:  Julian Fortune DO    Anesthesia Type:  general ASA Status:  3          Anesthesia Type: general    Marii Phase I: Marii Score: 10    Marii Phase II: Marii Score: 10    Last vitals: Reviewed and per EMR flowsheets.        Anesthesia Post Evaluation    Patient location during evaluation: PACU  Patient participation: complete - patient participated  Level of consciousness: awake and alert  Airway patency: patent  Nausea & Vomiting: no nausea and no vomiting  Complications: no  Cardiovascular status: hemodynamically stable  Respiratory status: acceptable  Hydration status: euvolemic

## 2020-02-07 NOTE — PROGRESS NOTES
Patient and sister given discharge instructions and verbally understands.  Patient escorted out to car to care of self and sister
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
## 2020-03-10 ENCOUNTER — HOSPITAL ENCOUNTER (EMERGENCY)
Age: 50
Discharge: HOME | End: 2020-03-10
Attending: EMERGENCY MEDICINE
Payer: COMMERCIAL

## 2020-03-10 VITALS
BODY MASS INDEX: 18.88 KG/M2 | HEIGHT: 61 IN | DIASTOLIC BLOOD PRESSURE: 98 MMHG | TEMPERATURE: 98.4 F | WEIGHT: 100 LBS | HEART RATE: 102 BPM | SYSTOLIC BLOOD PRESSURE: 146 MMHG | RESPIRATION RATE: 16 BRPM | OXYGEN SATURATION: 95 %

## 2020-03-10 PROCEDURE — 99282 EMERGENCY DEPT VISIT SF MDM: CPT

## 2020-03-10 RX ORDER — LIDOCAINE HYDROCHLORIDE 20 MG/ML
15 SOLUTION OROPHARYNGEAL PRN
Qty: 1 BOTTLE | Refills: 0 | Status: SHIPPED | OUTPATIENT
Start: 2020-03-10 | End: 2020-09-22

## 2020-03-10 RX ORDER — TRIAMCINOLONE ACETONIDE 0.1 %
PASTE (GRAM) DENTAL
Qty: 1 TUBE | Refills: 1 | Status: SHIPPED | OUTPATIENT
Start: 2020-03-10 | End: 2020-03-17

## 2020-03-10 ASSESSMENT — PAIN DESCRIPTION - PAIN TYPE: TYPE: ACUTE PAIN

## 2020-03-10 ASSESSMENT — PAIN DESCRIPTION - DESCRIPTORS: DESCRIPTORS: ACHING

## 2020-03-10 NOTE — ED PROVIDER NOTES
High Blood Pressure in her maternal grandmother, mother, and sister; High Cholesterol in her sister; Osteoporosis in her mother; Substance Abuse in her paternal grandfather and sister. The patients home medications have been reviewed. Allergies: Patient has no known allergies. -------------------------------------------------- RESULTS -------------------------------------------------  All laboratory and radiology results have been personally reviewed by myself   LABS:  No results found for this visit on 03/10/20. RADIOLOGY:  Interpreted by Radiologist.  No orders to display       ------------------------- NURSING NOTES AND VITALS REVIEWED ---------------------------   The nursing notes within the ED encounter and vital signs as below have been reviewed. BP (!) 146/98   Pulse 102   Temp 98.4 °F (36.9 °C) (Temporal)   Resp 16   Ht 5' 1\" (1.549 m)   Wt 100 lb (45.4 kg)   LMP 12/14/2017   SpO2 95%   BMI 18.89 kg/m²   Oxygen Saturation Interpretation: Normal      ---------------------------------------------------PHYSICAL EXAM--------------------------------------      Constitutional/General: Alert and oriented x3, well appearing, non toxic in NAD  Head: NC/AT  Eyes: PERRL, EOMI  Mouth: Oropharynx clear, handling secretions, no trismus; there is a 2 x 1 cm eschar forming on the back of her tongue right where it has friction against her upper denture plate. There is no erythema drainage or signs of infection    Extremities: Moves all extremities x 4. Warm and well perfused  Skin: warm and dry without rash  Neurologic: GCS 15,  Psych: Normal Affect      ------------------------------ ED COURSE/MEDICAL DECISION MAKING----------------------  Medications - No data to display      Medical Decision Making:          Counseling:    The emergency provider has spoken with the patient and discussed todays results, in addition to providing specific details for the plan of care and counseling regarding the diagnosis and prognosis. Questions are answered at this time and they are agreeable with the plan.      --------------------------------- IMPRESSION AND DISPOSITION ---------------------------------    IMPRESSION  1.  Encounter for post surgical wound check        DISPOSITION  Disposition: Discharge to home  Patient condition is stable                  Maverick Sandy MD  03/10/20 [id number]

## 2020-04-20 RX ORDER — ALBUTEROL SULFATE 90 UG/1
AEROSOL, METERED RESPIRATORY (INHALATION)
Qty: 18 G | Refills: 3 | Status: SHIPPED
Start: 2020-04-20 | End: 2020-09-10 | Stop reason: SDUPTHER

## 2020-04-20 NOTE — TELEPHONE ENCOUNTER
Last Appointment:  12/2/2019  Future Appointments   Date Time Provider Es Malika   5/13/2020 10:45 AM Navin Patel MD [facility]   5/18/2020  1:30 PM Lindsey Bailey [facility]

## 2020-05-07 ENCOUNTER — TELEPHONE (OUTPATIENT)
Dept: OBGYN | Age: 50
End: 2020-05-07

## 2020-05-14 RX ORDER — TIOTROPIUM BROMIDE INHALATION SPRAY 3.12 UG/1
SPRAY, METERED RESPIRATORY (INHALATION)
Qty: 4 G | Refills: 2 | Status: SHIPPED
Start: 2020-05-14 | End: 2020-08-14

## 2020-05-14 NOTE — TELEPHONE ENCOUNTER
Last Appointment:  2/4/2020  Future Appointments   Date Time Provider Es Daly   5/28/2020  1:30 PM Nazia Levy MD [facility]

## 2020-07-21 ENCOUNTER — OFFICE VISIT (OUTPATIENT)
Dept: OBGYN | Age: 50
End: 2020-07-21
Payer: COMMERCIAL

## 2020-07-21 VITALS
HEART RATE: 89 BPM | DIASTOLIC BLOOD PRESSURE: 90 MMHG | TEMPERATURE: 97.7 F | WEIGHT: 121 LBS | BODY MASS INDEX: 22.86 KG/M2 | SYSTOLIC BLOOD PRESSURE: 170 MMHG

## 2020-07-21 PROCEDURE — G8427 DOCREV CUR MEDS BY ELIG CLIN: HCPCS | Performed by: OBSTETRICS & GYNECOLOGY

## 2020-07-21 PROCEDURE — 99213 OFFICE O/P EST LOW 20 MIN: CPT | Performed by: OBSTETRICS & GYNECOLOGY

## 2020-07-21 PROCEDURE — 3017F COLORECTAL CA SCREEN DOC REV: CPT | Performed by: OBSTETRICS & GYNECOLOGY

## 2020-07-21 PROCEDURE — 4004F PT TOBACCO SCREEN RCVD TLK: CPT | Performed by: OBSTETRICS & GYNECOLOGY

## 2020-07-21 PROCEDURE — G8420 CALC BMI NORM PARAMETERS: HCPCS | Performed by: OBSTETRICS & GYNECOLOGY

## 2020-07-21 PROCEDURE — 99212 OFFICE O/P EST SF 10 MIN: CPT | Performed by: OBSTETRICS & GYNECOLOGY

## 2020-07-21 RX ORDER — NAPROXEN SODIUM 550 MG/1
550 TABLET ORAL 2 TIMES DAILY WITH MEALS
Qty: 40 TABLET | Refills: 1 | Status: SHIPPED
Start: 2020-07-21 | End: 2020-09-22

## 2020-07-21 NOTE — PROGRESS NOTES
Pt here today for postmenopausal bleeding. States for the past month she has been cramping and bleeding on and off. Also states she has been bleeding after intercourse which she has never had this issue before. Dr. Sissy Brown notified of elevated bps. Pt states she has not taken her medication and was advised to do so when she gets home. Assisted Dr. Sissy Brown with pelvic exam.  No specimens obtained. Discharge instructions have been discussed with the patient by Dr. Sissy Brown and she voiced understanding of care plan. 86 yo female admitted with Large left MCA territory infarct and found to have frequent ectopy on Tele

## 2020-07-21 NOTE — PROGRESS NOTES
Began bleeding again about a month ago. Had [facility] in past with inactive endometrium. Also noted bleeding after intercourse. Path from [facility] was inactive endometrium at that time. Also notes some cramping as well. Pap was negative 1 year ago or so. Pelvic: Vulva:Normal, quite atrophic   Vagina:Clear, no bleeding today   Cx:Clear   Ut:Same, anterior   Jr:I feel no masses but tender towards her right side. Has had a  mesh placement years ago    IMP: Atrophic vulvitis, PMB    PLAN: Transvaginal sonogram and see back in 3 weeks. Anaprox for cramps    .

## 2020-08-05 ENCOUNTER — HOSPITAL ENCOUNTER (OUTPATIENT)
Dept: ULTRASOUND IMAGING | Age: 50
Discharge: HOME | End: 2020-08-07
Payer: COMMERCIAL

## 2020-08-05 PROCEDURE — 76830 TRANSVAGINAL US NON-OB: CPT

## 2020-08-11 ENCOUNTER — OFFICE VISIT (OUTPATIENT)
Dept: OBGYN | Age: 50
End: 2020-08-11
Payer: COMMERCIAL

## 2020-08-11 VITALS
HEART RATE: 90 BPM | DIASTOLIC BLOOD PRESSURE: 80 MMHG | SYSTOLIC BLOOD PRESSURE: 132 MMHG | HEIGHT: 61 IN | TEMPERATURE: 98.2 F | WEIGHT: 118 LBS | BODY MASS INDEX: 22.28 KG/M2 | RESPIRATION RATE: 16 BRPM

## 2020-08-11 PROCEDURE — 99024 POSTOP FOLLOW-UP VISIT: CPT | Performed by: OBSTETRICS & GYNECOLOGY

## 2020-08-11 PROCEDURE — 99212 OFFICE O/P EST SF 10 MIN: CPT | Performed by: OBSTETRICS & GYNECOLOGY

## 2020-08-11 NOTE — PROGRESS NOTES
Here to go over her sonogram.  Abnormal bleeding. Endometrial lining is thickened and Needs sampled again. Had D&C 5 years ago. Discussed trying an endometrial biopsy here in the clinic. Sge is to take 2-3 OTC motrin before the procedure for cramps. Will schedule.

## 2020-08-12 ENCOUNTER — TELEPHONE (OUTPATIENT)
Dept: OBGYN | Age: 50
End: 2020-08-12

## 2020-08-12 NOTE — TELEPHONE ENCOUNTER
Patient called in and stated that she is having horrible cramping and the medication naproxen is making her nauseous. Patient stated she is taking over the counter pain reliever but it is not helping. Patient wanted to know if you could order her anything different. Patient also asked about a pain clinic referral and I informed patient that her PCP would have to do that.

## 2020-08-13 NOTE — TELEPHONE ENCOUNTER
Nothing else that we can order out of our clinic. Unable to prescribe opioids just Ibuprofen and naprosyn. You are correcty that her PCP would have to refer to pain management. Please let her know.

## 2020-08-14 RX ORDER — TIOTROPIUM BROMIDE INHALATION SPRAY 3.12 UG/1
SPRAY, METERED RESPIRATORY (INHALATION)
Qty: 4 G | Refills: 2 | Status: SHIPPED
Start: 2020-08-14 | End: 2020-09-22 | Stop reason: SDUPTHER

## 2020-08-14 NOTE — TELEPHONE ENCOUNTER
Last Appointment:  12/2/2019  Future Appointments   Date Time Provider Es Daly   9/1/2020  1:45 PM John Walker  [facility]

## 2020-08-20 RX ORDER — IBUPROFEN 600 MG/1
600 TABLET ORAL 3 TIMES DAILY PRN
Qty: 90 TABLET | Refills: 1 | Status: SHIPPED
Start: 2020-08-20 | End: 2020-10-20

## 2020-08-20 NOTE — TELEPHONE ENCOUNTER
Patient needs to cautious when taking ibuprofen with Paxil. Can increase risk for GI bleeding. Please let the patient know this.

## 2020-09-01 ENCOUNTER — PROCEDURE VISIT (OUTPATIENT)
Dept: OBGYN | Age: 50
End: 2020-09-01
Payer: COMMERCIAL

## 2020-09-01 VITALS
HEART RATE: 96 BPM | WEIGHT: 118 LBS | BODY MASS INDEX: 22.3 KG/M2 | DIASTOLIC BLOOD PRESSURE: 81 MMHG | SYSTOLIC BLOOD PRESSURE: 132 MMHG

## 2020-09-01 PROCEDURE — G8420 CALC BMI NORM PARAMETERS: HCPCS | Performed by: OBSTETRICS & GYNECOLOGY

## 2020-09-01 PROCEDURE — 4004F PT TOBACCO SCREEN RCVD TLK: CPT | Performed by: OBSTETRICS & GYNECOLOGY

## 2020-09-01 PROCEDURE — 99214 OFFICE O/P EST MOD 30 MIN: CPT | Performed by: OBSTETRICS & GYNECOLOGY

## 2020-09-01 PROCEDURE — 3017F COLORECTAL CA SCREEN DOC REV: CPT | Performed by: OBSTETRICS & GYNECOLOGY

## 2020-09-01 PROCEDURE — G8427 DOCREV CUR MEDS BY ELIG CLIN: HCPCS | Performed by: OBSTETRICS & GYNECOLOGY

## 2020-09-01 PROCEDURE — 99212 OFFICE O/P EST SF 10 MIN: CPT | Performed by: OBSTETRICS & GYNECOLOGY

## 2020-09-01 NOTE — PROGRESS NOTES
Patient is here today for [facility], the biopsy was unable to be performed today. A consent was signed and will be scanned into the computer.

## 2020-09-01 NOTE — PROGRESS NOTES
Here today for endometrisal biopsy for thickened endometrium and PMPB>  Procedure discussed again and permits obtained. Attempted to perform an endometrial biopsy today but was unable to pass canal finder past a cervical stenosis. She will need to have another D&C and Hysteroscopy done. Will refer to Dr. Cristina Boxer or Beatriz for that procedure. Discussed at length. She had a [facility] some years ago by me.

## 2020-09-09 NOTE — TELEPHONE ENCOUNTER
Last Appointment:  Visit date not found  Future Appointments   Date Time Provider Es Daly   9/16/2020  1:15 PM Selene Fay  [facility]

## 2020-09-10 RX ORDER — ALBUTEROL SULFATE 90 UG/1
AEROSOL, METERED RESPIRATORY (INHALATION)
Qty: 18 G | Refills: 5 | Status: SHIPPED
Start: 2020-09-10 | End: 2020-12-16 | Stop reason: SDUPTHER

## 2020-09-10 RX ORDER — ALBUTEROL SULFATE 90 UG/1
AEROSOL, METERED RESPIRATORY (INHALATION)
Qty: 18 G | Refills: 3 | OUTPATIENT
Start: 2020-09-10 | End: 2021-09-09

## 2020-09-16 ENCOUNTER — OFFICE VISIT (OUTPATIENT)
Dept: OBGYN | Age: 50
End: 2020-09-16
Payer: COMMERCIAL

## 2020-09-16 VITALS
DIASTOLIC BLOOD PRESSURE: 86 MMHG | HEART RATE: 101 BPM | WEIGHT: 124 LBS | BODY MASS INDEX: 23.43 KG/M2 | SYSTOLIC BLOOD PRESSURE: 161 MMHG

## 2020-09-16 PROCEDURE — 99212 OFFICE O/P EST SF 10 MIN: CPT | Performed by: OBSTETRICS & GYNECOLOGY

## 2020-09-16 PROCEDURE — 99214 OFFICE O/P EST MOD 30 MIN: CPT | Performed by: OBSTETRICS & GYNECOLOGY

## 2020-09-16 ASSESSMENT — ENCOUNTER SYMPTOMS
RHINORRHEA: 0
CRAMPS: 1
CHEST TIGHTNESS: 0
ABDOMINAL DISTENTION: 0
WHEEZING: 0
SORE THROAT: 0
DIARRHEA: 0
SHORTNESS OF BREATH: 0
ABDOMINAL PAIN: 0
VOMITING: 0
CONSTIPATION: 0
NAUSEA: 0
SINUS PAIN: 0

## 2020-09-16 NOTE — PROGRESS NOTES
Subjective:      Patient ID: Peg Galvin is a 44 y.o. female. Patient presents for preop visit. She was previously seen by Dr. Kiran Dennis for postmenopausal bleeding and he was not able to get an endometrial biopsy. Pelvic ultrasound showed thickened endometrium. Patient complains of irregular spotting and some cramping    Abdominal Cramping   Pertinent negatives include no abdominal pain, chest pain, chills, fatigue, fever, headaches, nausea, rash, sore throat, vomiting or weakness. Review of Systems   Constitutional: Negative for chills, fatigue and fever. HENT: Negative for rhinorrhea, sinus pain, sneezing and sore throat. Respiratory: Negative for chest tightness, shortness of breath and wheezing. Cardiovascular: Negative for chest pain and palpitations. Gastrointestinal: Negative for abdominal distention, abdominal pain, constipation, diarrhea, nausea and vomiting. Genitourinary: Negative for difficulty urinating, dyspareunia, dysuria, hematuria, pelvic pain and vaginal discharge. Skin: Negative for rash. Neurological: Negative for syncope, weakness and headaches. Objective:   Physical Exam  Constitutional:       General: She is not in acute distress. Appearance: Normal appearance. She is not ill-appearing. Cardiovascular:      Rate and Rhythm: Normal rate and regular rhythm. Pulmonary:      Effort: Pulmonary effort is normal.      Breath sounds: Normal breath sounds. Abdominal:      General: Abdomen is flat. There is no distension. Palpations: Abdomen is soft. There is no mass. Tenderness: There is abdominal tenderness. There is no guarding or rebound. Comments: Mildly tender suprapubically   Skin:     General: Skin is warm and dry. Findings: No rash. Neurological:      Mental Status: She is alert. Cranial Nerves: No cranial nerve deficit.    Psychiatric:         Mood and Affect: Mood normal.         Behavior: Behavior normal. Assessment:      Postmenopausal bleeding      Plan: We will schedule patient for hysteroscopy and D&C. Explained procedure to patient detail. Risks explained to patient including but not limited to bleeding, infection, uterine perforation, damage to internal structures such as bowel bladder or major blood vessels, risk of laparoscopy/laparotomy, PE/DVT, anesthesia and death. All questions answered.   Will need preop clearance from [facility]        Artist MD Pamela

## 2020-09-22 ENCOUNTER — OFFICE VISIT (OUTPATIENT)
Dept: FAMILY MEDICINE CLINIC | Age: 50
End: 2020-09-22
Payer: COMMERCIAL

## 2020-09-22 VITALS
SYSTOLIC BLOOD PRESSURE: 133 MMHG | HEIGHT: 61 IN | HEART RATE: 102 BPM | OXYGEN SATURATION: 92 % | WEIGHT: 127 LBS | TEMPERATURE: 98.1 F | DIASTOLIC BLOOD PRESSURE: 79 MMHG | BODY MASS INDEX: 23.98 KG/M2

## 2020-09-22 PROBLEM — R93.89 ENDOMETRIAL THICKENING ON ULTRASOUND: Status: ACTIVE | Noted: 2020-09-22

## 2020-09-22 PROBLEM — J45.909 MODERATE ASTHMA WITHOUT COMPLICATION: Status: ACTIVE | Noted: 2020-09-22

## 2020-09-22 PROCEDURE — 3017F COLORECTAL CA SCREEN DOC REV: CPT | Performed by: FAMILY MEDICINE

## 2020-09-22 PROCEDURE — 3023F SPIROM DOC REV: CPT | Performed by: FAMILY MEDICINE

## 2020-09-22 PROCEDURE — G8420 CALC BMI NORM PARAMETERS: HCPCS | Performed by: FAMILY MEDICINE

## 2020-09-22 PROCEDURE — 4004F PT TOBACCO SCREEN RCVD TLK: CPT | Performed by: FAMILY MEDICINE

## 2020-09-22 PROCEDURE — G8926 SPIRO NO PERF OR DOC: HCPCS | Performed by: FAMILY MEDICINE

## 2020-09-22 PROCEDURE — 99212 OFFICE O/P EST SF 10 MIN: CPT | Performed by: STUDENT IN AN ORGANIZED HEALTH CARE EDUCATION/TRAINING PROGRAM

## 2020-09-22 PROCEDURE — 99213 OFFICE O/P EST LOW 20 MIN: CPT | Performed by: STUDENT IN AN ORGANIZED HEALTH CARE EDUCATION/TRAINING PROGRAM

## 2020-09-22 PROCEDURE — G8427 DOCREV CUR MEDS BY ELIG CLIN: HCPCS | Performed by: FAMILY MEDICINE

## 2020-09-22 RX ORDER — QUETIAPINE FUMARATE 100 MG/1
50 TABLET, FILM COATED ORAL PRN
Qty: 60 TABLET | Refills: 0 | Status: SHIPPED
Start: 2020-09-22 | End: 2021-01-26

## 2020-09-22 RX ORDER — TIOTROPIUM BROMIDE INHALATION SPRAY 3.12 UG/1
SPRAY, METERED RESPIRATORY (INHALATION)
Qty: 4 G | Refills: 2 | Status: SHIPPED
Start: 2020-09-22 | End: 2020-12-16 | Stop reason: SDUPTHER

## 2020-09-22 ASSESSMENT — ENCOUNTER SYMPTOMS
ABDOMINAL PAIN: 0
BACK PAIN: 0
DIARRHEA: 0
SHORTNESS OF BREATH: 0
CHEST TIGHTNESS: 0
NAUSEA: 0
COLOR CHANGE: 0
SORE THROAT: 0
ABDOMINAL DISTENTION: 0
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
## 2020-09-22 NOTE — PROGRESS NOTES
S: 44 y.o. female here for preop for hysteroscopy with D and C on October 22 with Dr. Selvin Blanco. Thickened endometrium, postmenopausal bleeding and chronic pelvic pain. General anesthesia. Denies cp, shortness of breath or palpitations. Hx of copd. Able to climb 2 flights of stairs. 6 cigs/day. No alcohol for 6 months or other substances recently. O: VS: /79 (Site: Right Upper Arm, Position: Sitting, Cuff Size: Medium Adult)   Pulse 102   Temp 98.1 °F (36.7 °C) (Temporal)   Ht 5' 1\" (1.549 m)   Wt 127 lb (57.6 kg)   LMP 12/14/2017   SpO2 92%   BMI 24.00 kg/m²    General: NAD   CV:  RRR, no gallops, rubs, or murmurs   Resp: CTAB no R/R/W   Abd:  Soft, nontender, no masses    Ext:  no C/C/E  Impression/Plan:   1. Post-menopausal bleeding-revised cardiac index score 1 for biopsy. Low risk for cardiac complications and cardiac surgery. Labs. 2. Chronic pelvic pain-maximize nsaids, heat, exercises. If intractable, referral to pain mgmt. 3. COPD and asthma-stable    rto 1 week postop    Attending Physician Statement  I have discussed the case, including pertinent history and exam findings with the resident. I agree with the documented assessment and plan.         Minda Paul MD

## 2020-09-23 ENCOUNTER — TELEPHONE (OUTPATIENT)
Dept: FAMILY MEDICINE CLINIC | Age: 50
End: 2020-09-23

## 2020-09-23 NOTE — TELEPHONE ENCOUNTER
Physical therapy called re referral for pelvic pain  The referral needs changed to occupational therapy so she can be scheduled  Thanks

## 2020-10-05 ENCOUNTER — TELEPHONE (OUTPATIENT)
Dept: OBGYN | Age: 50
End: 2020-10-05

## 2020-10-05 NOTE — TELEPHONE ENCOUNTER
Hysteroscopy and D&C scheduled for 10/20/2020 @ 9:30 AM   Patient notified of surgery date, time and place. Patient advised she will need to arrive at 7:30 AM.   Instructions given to patient over the phone.   Medical clearance completed and in chart  Patient voiced understading

## 2020-10-13 RX ORDER — MULTIVIT WITH MINERALS/LUTEIN
250 TABLET ORAL DAILY
COMMUNITY

## 2020-10-13 NOTE — PROGRESS NOTES

## 2020-10-13 NOTE — PROGRESS NOTES
[facility] PRE-ADMISSION TESTING INSTRUCTIONS    The Preadmission Testing patient is instructed accordingly using the following criteria (check applicable):    ARRIVAL INSTRUCTIONS:  [x] Parking the day of Surgery is located in the Main Entrance lot. Upon entering the door, make an immediate right to the surgery reception desk    [] [phone] is available Monday through Friday 6 am to 6 pm    [x] Bring photo ID and insurance card    [] Bring in a copy of Living will or Durable Power of  papers. [x] Please be sure to arrange for responsible adult to provide transportation to and from the hospital    [x] Please arrange for responsible adult to be with you for the 24 hour period post procedure due to having anesthesia      GENERAL INSTRUCTIONS:    [x] Nothing by mouth after midnight, including gum, candy, mints or water    [x] You may brush your teeth, but do not swallow any water    [x] Take medications as instructed with 1-2 oz of water    [x] Stop herbal supplements and vitamins 5 days prior to procedure    [] Follow preop dosing of blood thinners per physician instructions    [] Take 1/2 dose of evening insulin, but no insulin after midnight    [] No oral diabetic medications after midnight    [] If diabetic and have low blood sugar or feel symptomatic, take 1-2oz apple juice only    [x] Bring inhalers day of surgery    [] Bring C-PAP/ Bi-Pap day of surgery    [] Bring urine specimen day of surgery    [x] Shower or bath with soap, lather and rinse well, AM of Surgery, no lotion, powders or creams to surgical site    [] Follow bowel prep as instructed per surgeon    [x] No tobacco products within 24 hours of surgery     [x] No alcohol or illegal drug use within 24 hours of surgery.     [x] Jewelry, body piercing's, eyeglasses, contact lenses and dentures are not permitted into surgery (bring cases)      [x] Please do not wear any nail polish, make up or hair products on the day of surgery    [x] If not already done, you can expect a call from registration    [x] You can expect a call the business day prior to procedure to notify you if your arrival time changes    [x] If you receive a survey after surgery we would greatly appreciate your comments    [] Parent/guardian of a minor must accompany their child and remain on the premises  the entire time they are under our care     [] Pediatric patients may bring favorite toy, blanket or comfort item with them    [] A caregiver or family member must remain with the patient during their stay if they are mentally handicapped, have dementia, disoriented or unable to use a call light or would be a safety concern if left unattended    [x] Please notify surgeon if you develop any illness between now and time of surgery (cold, cough, sore throat, fever, nausea, vomiting) or any signs of infections  including skin, wounds, and dental.    [x]  The Outpatient Pharmacy is available to fill your prescription here on your day of surgery, ask your preop nurse for details    [] Other instructions  EDUCATIONAL MATERIALS PROVIDED:    [] PAT Preoperative Education Packet/Booklet     [] Medication List    [] Fluoroscopy Information Pamphlet    [] Transfusion bracelet applied with instructions    [] Joint replacement video reviewed    [] Shower with soap, lather and rinse well, and use CHG wipes provided the evening before surgery as instructed

## 2020-10-15 ENCOUNTER — HOSPITAL ENCOUNTER (OUTPATIENT)
Age: 50
Discharge: HOME | End: 2020-10-17
Payer: COMMERCIAL

## 2020-10-15 PROCEDURE — U0003 INFECTIOUS AGENT DETECTION BY NUCLEIC ACID (DNA OR RNA); SEVERE ACUTE RESPIRATORY SYNDROME CORONAVIRUS 2 (SARS-COV-2) (CORONAVIRUS DISEASE [COVID-19]), AMPLIFIED PROBE TECHNIQUE, MAKING USE OF HIGH THROUGHPUT TECHNOLOGIES AS DESCRIBED BY CMS-2020-01-R: HCPCS

## 2020-10-17 LAB
SARS-COV-2: NOT DETECTED
SOURCE: NORMAL

## 2020-10-19 NOTE — TELEPHONE ENCOUNTER
Patient requesting refill on Ibuprofen 600  Patient is having a D&C tomorrow  Unsure if you will fill this for patient

## 2020-10-20 ENCOUNTER — ANESTHESIA EVENT (OUTPATIENT)
Dept: OPERATING ROOM | Age: 50
End: 2020-10-20
Payer: COMMERCIAL

## 2020-10-20 ENCOUNTER — ANESTHESIA (OUTPATIENT)
Dept: OPERATING ROOM | Age: 50
End: 2020-10-20
Payer: COMMERCIAL

## 2020-10-20 ENCOUNTER — HOSPITAL ENCOUNTER (OUTPATIENT)
Age: 50
Setting detail: OUTPATIENT SURGERY
Discharge: HOME | End: 2020-10-20
Attending: OBSTETRICS & GYNECOLOGY | Admitting: OBSTETRICS & GYNECOLOGY
Payer: COMMERCIAL

## 2020-10-20 VITALS
BODY MASS INDEX: 21.71 KG/M2 | OXYGEN SATURATION: 98 % | TEMPERATURE: 97.8 F | DIASTOLIC BLOOD PRESSURE: 74 MMHG | HEART RATE: 78 BPM | HEIGHT: 61 IN | WEIGHT: 115 LBS | SYSTOLIC BLOOD PRESSURE: 138 MMHG | RESPIRATION RATE: 16 BRPM

## 2020-10-20 VITALS — TEMPERATURE: 96.3 F | DIASTOLIC BLOOD PRESSURE: 62 MMHG | SYSTOLIC BLOOD PRESSURE: 108 MMHG | OXYGEN SATURATION: 96 %

## 2020-10-20 LAB
ABO/RH: NORMAL
ANTIBODY SCREEN: NORMAL
HCT VFR BLD CALC: 42.1 % (ref 34–48)
HEMOGLOBIN: 13.6 G/DL (ref 11.5–15.5)
MCH RBC QN AUTO: 31.5 PG (ref 26–35)
MCHC RBC AUTO-ENTMCNC: 32.3 % (ref 32–34.5)
MCV RBC AUTO: 97.5 FL (ref 80–99.9)
PDW BLD-RTO: 12.7 FL (ref 11.5–15)
PLATELET # BLD: 217 E9/L (ref 130–450)
PMV BLD AUTO: 9.7 FL (ref 7–12)
RBC # BLD: 4.32 E12/L (ref 3.5–5.5)
WBC # BLD: 4.7 E9/L (ref 4.5–11.5)

## 2020-10-20 PROCEDURE — 6360000002 HC RX W HCPCS: Performed by: ANESTHESIOLOGY

## 2020-10-20 PROCEDURE — 7100000010 HC PHASE II RECOVERY - FIRST 15 MIN: Performed by: OBSTETRICS & GYNECOLOGY

## 2020-10-20 PROCEDURE — 3600000003 HC SURGERY LEVEL 3 BASE: Performed by: OBSTETRICS & GYNECOLOGY

## 2020-10-20 PROCEDURE — 86900 BLOOD TYPING SEROLOGIC ABO: CPT

## 2020-10-20 PROCEDURE — 36415 COLL VENOUS BLD VENIPUNCTURE: CPT

## 2020-10-20 PROCEDURE — 7100000011 HC PHASE II RECOVERY - ADDTL 15 MIN: Performed by: OBSTETRICS & GYNECOLOGY

## 2020-10-20 PROCEDURE — 2580000003 HC RX 258: Performed by: REGISTERED NURSE

## 2020-10-20 PROCEDURE — 86850 RBC ANTIBODY SCREEN: CPT

## 2020-10-20 PROCEDURE — 88305 TISSUE EXAM BY PATHOLOGIST: CPT

## 2020-10-20 PROCEDURE — 3700000000 HC ANESTHESIA ATTENDED CARE: Performed by: OBSTETRICS & GYNECOLOGY

## 2020-10-20 PROCEDURE — 88304 TISSUE EXAM BY PATHOLOGIST: CPT

## 2020-10-20 PROCEDURE — 2709999900 HC NON-CHARGEABLE SUPPLY: Performed by: OBSTETRICS & GYNECOLOGY

## 2020-10-20 PROCEDURE — 7100000000 HC PACU RECOVERY - FIRST 15 MIN: Performed by: OBSTETRICS & GYNECOLOGY

## 2020-10-20 PROCEDURE — 58558 HYSTEROSCOPY BIOPSY: CPT | Performed by: OBSTETRICS & GYNECOLOGY

## 2020-10-20 PROCEDURE — 86901 BLOOD TYPING SEROLOGIC RH(D): CPT

## 2020-10-20 PROCEDURE — 3700000001 HC ADD 15 MINUTES (ANESTHESIA): Performed by: OBSTETRICS & GYNECOLOGY

## 2020-10-20 PROCEDURE — 6360000002 HC RX W HCPCS: Performed by: REGISTERED NURSE

## 2020-10-20 PROCEDURE — 85027 COMPLETE CBC AUTOMATED: CPT

## 2020-10-20 PROCEDURE — 3600000013 HC SURGERY LEVEL 3 ADDTL 15MIN: Performed by: OBSTETRICS & GYNECOLOGY

## 2020-10-20 PROCEDURE — 7100000001 HC PACU RECOVERY - ADDTL 15 MIN: Performed by: OBSTETRICS & GYNECOLOGY

## 2020-10-20 RX ORDER — PAROXETINE 10 MG/1
10 TABLET, FILM COATED ORAL EVERY MORNING
COMMUNITY

## 2020-10-20 RX ORDER — PROMETHAZINE HYDROCHLORIDE 25 MG/ML
6.25 INJECTION, SOLUTION INTRAMUSCULAR; INTRAVENOUS
Status: DISCONTINUED | OUTPATIENT
Start: 2020-10-20 | End: 2020-10-20 | Stop reason: HOSPADM

## 2020-10-20 RX ORDER — OXYCODONE HYDROCHLORIDE 5 MG/1
5 TABLET ORAL EVERY 4 HOURS PRN
Status: DISCONTINUED | OUTPATIENT
Start: 2020-10-20 | End: 2020-10-20 | Stop reason: HOSPADM

## 2020-10-20 RX ORDER — SODIUM CHLORIDE 0.9 % (FLUSH) 0.9 %
10 SYRINGE (ML) INJECTION PRN
Status: DISCONTINUED | OUTPATIENT
Start: 2020-10-20 | End: 2020-10-20 | Stop reason: HOSPADM

## 2020-10-20 RX ORDER — DIPHENHYDRAMINE HYDROCHLORIDE 50 MG/ML
12.5 INJECTION INTRAMUSCULAR; INTRAVENOUS
Status: DISCONTINUED | OUTPATIENT
Start: 2020-10-20 | End: 2020-10-20 | Stop reason: HOSPADM

## 2020-10-20 RX ORDER — IBUPROFEN 600 MG/1
TABLET ORAL
Qty: 90 TABLET | Refills: 1 | Status: SHIPPED | OUTPATIENT
Start: 2020-10-20

## 2020-10-20 RX ORDER — PROMETHAZINE HYDROCHLORIDE 25 MG/1
12.5 TABLET ORAL EVERY 6 HOURS PRN
Status: DISCONTINUED | OUTPATIENT
Start: 2020-10-20 | End: 2020-10-20 | Stop reason: HOSPADM

## 2020-10-20 RX ORDER — FENTANYL CITRATE 50 UG/ML
50 INJECTION, SOLUTION INTRAMUSCULAR; INTRAVENOUS EVERY 5 MIN PRN
Status: DISCONTINUED | OUTPATIENT
Start: 2020-10-20 | End: 2020-10-20 | Stop reason: HOSPADM

## 2020-10-20 RX ORDER — OXYCODONE HYDROCHLORIDE AND ACETAMINOPHEN 5; 325 MG/1; MG/1
1 TABLET ORAL
Status: DISCONTINUED | OUTPATIENT
Start: 2020-10-20 | End: 2020-10-20 | Stop reason: HOSPADM

## 2020-10-20 RX ORDER — MEPERIDINE HYDROCHLORIDE 25 MG/ML
12.5 INJECTION INTRAMUSCULAR; INTRAVENOUS; SUBCUTANEOUS EVERY 5 MIN PRN
Status: DISCONTINUED | OUTPATIENT
Start: 2020-10-20 | End: 2020-10-20 | Stop reason: HOSPADM

## 2020-10-20 RX ORDER — IBUPROFEN 800 MG/1
800 TABLET ORAL EVERY 8 HOURS PRN
Status: DISCONTINUED | OUTPATIENT
Start: 2020-10-20 | End: 2020-10-20 | Stop reason: HOSPADM

## 2020-10-20 RX ORDER — OXYCODONE HYDROCHLORIDE 5 MG/1
10 TABLET ORAL EVERY 4 HOURS PRN
Status: DISCONTINUED | OUTPATIENT
Start: 2020-10-20 | End: 2020-10-20 | Stop reason: HOSPADM

## 2020-10-20 RX ORDER — ONDANSETRON 2 MG/ML
INJECTION INTRAMUSCULAR; INTRAVENOUS PRN
Status: DISCONTINUED | OUTPATIENT
Start: 2020-10-20 | End: 2020-10-20 | Stop reason: SDUPTHER

## 2020-10-20 RX ORDER — FENTANYL CITRATE 50 UG/ML
INJECTION, SOLUTION INTRAMUSCULAR; INTRAVENOUS PRN
Status: DISCONTINUED | OUTPATIENT
Start: 2020-10-20 | End: 2020-10-20 | Stop reason: SDUPTHER

## 2020-10-20 RX ORDER — PROPOFOL 10 MG/ML
INJECTION, EMULSION INTRAVENOUS PRN
Status: DISCONTINUED | OUTPATIENT
Start: 2020-10-20 | End: 2020-10-20 | Stop reason: SDUPTHER

## 2020-10-20 RX ORDER — FENTANYL CITRATE 50 UG/ML
25 INJECTION, SOLUTION INTRAMUSCULAR; INTRAVENOUS EVERY 5 MIN PRN
Status: DISCONTINUED | OUTPATIENT
Start: 2020-10-20 | End: 2020-10-20 | Stop reason: HOSPADM

## 2020-10-20 RX ORDER — ACETAMINOPHEN 325 MG/1
650 TABLET ORAL EVERY 4 HOURS PRN
Status: DISCONTINUED | OUTPATIENT
Start: 2020-10-20 | End: 2020-10-20 | Stop reason: HOSPADM

## 2020-10-20 RX ORDER — MIDAZOLAM HYDROCHLORIDE 1 MG/ML
INJECTION INTRAMUSCULAR; INTRAVENOUS PRN
Status: DISCONTINUED | OUTPATIENT
Start: 2020-10-20 | End: 2020-10-20 | Stop reason: SDUPTHER

## 2020-10-20 RX ORDER — SODIUM CHLORIDE 0.9 % (FLUSH) 0.9 %
10 SYRINGE (ML) INJECTION EVERY 12 HOURS SCHEDULED
Status: DISCONTINUED | OUTPATIENT
Start: 2020-10-20 | End: 2020-10-20 | Stop reason: HOSPADM

## 2020-10-20 RX ORDER — DEXAMETHASONE SODIUM PHOSPHATE 4 MG/ML
INJECTION, SOLUTION INTRA-ARTICULAR; INTRALESIONAL; INTRAMUSCULAR; INTRAVENOUS; SOFT TISSUE PRN
Status: DISCONTINUED | OUTPATIENT
Start: 2020-10-20 | End: 2020-10-20 | Stop reason: SDUPTHER

## 2020-10-20 RX ORDER — SODIUM CHLORIDE 9 MG/ML
INJECTION, SOLUTION INTRAVENOUS CONTINUOUS PRN
Status: DISCONTINUED | OUTPATIENT
Start: 2020-10-20 | End: 2020-10-20 | Stop reason: SDUPTHER

## 2020-10-20 RX ORDER — ONDANSETRON 2 MG/ML
4 INJECTION INTRAMUSCULAR; INTRAVENOUS EVERY 6 HOURS PRN
Status: DISCONTINUED | OUTPATIENT
Start: 2020-10-20 | End: 2020-10-20 | Stop reason: HOSPADM

## 2020-10-20 RX ADMIN — PROPOFOL 150 MG: 10 INJECTION, EMULSION INTRAVENOUS at 10:59

## 2020-10-20 RX ADMIN — HYDROMORPHONE HYDROCHLORIDE 0.5 MG: 1 INJECTION, SOLUTION INTRAMUSCULAR; INTRAVENOUS; SUBCUTANEOUS at 11:51

## 2020-10-20 RX ADMIN — HYDROMORPHONE HYDROCHLORIDE 0.5 MG: 1 INJECTION, SOLUTION INTRAMUSCULAR; INTRAVENOUS; SUBCUTANEOUS at 11:39

## 2020-10-20 RX ADMIN — FENTANYL CITRATE 100 MCG: 50 INJECTION, SOLUTION INTRAMUSCULAR; INTRAVENOUS at 10:59

## 2020-10-20 RX ADMIN — DEXAMETHASONE SODIUM PHOSPHATE 10 MG: 4 INJECTION, SOLUTION INTRAMUSCULAR; INTRAVENOUS at 11:01

## 2020-10-20 RX ADMIN — HYDROMORPHONE HYDROCHLORIDE 0.5 MG: 1 INJECTION, SOLUTION INTRAMUSCULAR; INTRAVENOUS; SUBCUTANEOUS at 12:05

## 2020-10-20 RX ADMIN — SODIUM CHLORIDE: 9 INJECTION, SOLUTION INTRAVENOUS at 10:55

## 2020-10-20 RX ADMIN — ONDANSETRON 4 MG: 2 INJECTION INTRAMUSCULAR; INTRAVENOUS at 11:01

## 2020-10-20 RX ADMIN — MIDAZOLAM 2 MG: 1 INJECTION INTRAMUSCULAR; INTRAVENOUS at 10:55

## 2020-10-20 ASSESSMENT — PULMONARY FUNCTION TESTS
PIF_VALUE: 15
PIF_VALUE: 18
PIF_VALUE: 2
PIF_VALUE: 1
PIF_VALUE: 12
PIF_VALUE: 15
PIF_VALUE: 21
PIF_VALUE: 16
PIF_VALUE: 15
PIF_VALUE: 15
PIF_VALUE: 10
PIF_VALUE: 2
PIF_VALUE: 10
PIF_VALUE: 12
PIF_VALUE: 17
PIF_VALUE: 2
PIF_VALUE: 0
PIF_VALUE: 12
PIF_VALUE: 2
PIF_VALUE: 12
PIF_VALUE: 15
PIF_VALUE: 10

## 2020-10-20 ASSESSMENT — PAIN SCALES - GENERAL
PAINLEVEL_OUTOF10: 9
PAINLEVEL_OUTOF10: 1
PAINLEVEL_OUTOF10: 0
PAINLEVEL_OUTOF10: 7

## 2020-10-20 ASSESSMENT — PAIN DESCRIPTION - DESCRIPTORS
DESCRIPTORS: ACHING;CRAMPING;DISCOMFORT
DESCRIPTORS: ACHING;CRAMPING
DESCRIPTORS: ACHING;CRAMPING

## 2020-10-20 ASSESSMENT — LIFESTYLE VARIABLES: SMOKING_STATUS: 1

## 2020-10-20 ASSESSMENT — PAIN DESCRIPTION - LOCATION
LOCATION: ABDOMEN
LOCATION: ABDOMEN

## 2020-10-20 ASSESSMENT — PAIN DESCRIPTION - FREQUENCY
FREQUENCY: CONTINUOUS
FREQUENCY: CONTINUOUS

## 2020-10-20 ASSESSMENT — PAIN DESCRIPTION - PAIN TYPE
TYPE: SURGICAL PAIN
TYPE: SURGICAL PAIN

## 2020-10-20 ASSESSMENT — PAIN - FUNCTIONAL ASSESSMENT: PAIN_FUNCTIONAL_ASSESSMENT: 0-10

## 2020-10-20 NOTE — ANESTHESIA POSTPROCEDURE EVALUATION
Department of Anesthesiology  Postprocedure Note    Patient: Emerson Alonso  MRN: [id number]  YOB: 1970  Date of evaluation: 10/20/2020  Time:  5:01 PM     Procedure Summary     Date:  10/20/20 Room / Location:  [facility] OR [address] [place] [address]    Anesthesia Start:  1055 Anesthesia Stop:  1129    Procedure:  DILATATION AND CURETTAGE HYSTEROSCOPY (N/A Vagina ) Diagnosis:  (POST MENOPAUSAL BLEEDING)    Surgeon:  Dipika Sheets MD Responsible Provider:  Katie Garza MD    Anesthesia Type:  general ASA Status:  3          Anesthesia Type: general    Marii Phase I: Marii Score: 10    Marii Phase II: Marii Score: 10    Last vitals: Reviewed and per EMR flowsheets.        Anesthesia Post Evaluation    Patient location during evaluation: PACU  Patient participation: complete - patient participated  Level of consciousness: awake  Airway patency: patent  Nausea & Vomiting: no vomiting and no nausea  Complications: no  Cardiovascular status: hemodynamically stable  Respiratory status: acceptable  Hydration status: stable

## 2020-10-20 NOTE — H&P
Department of Gynecology   History and Physical        CHIEF COMPLAINT:   Postmenopausal bleeding     Reason for Admission:  Hysteroscopy, D&C      HISTORY OF PRESENT ILLNESS:                     The patient is a 44 y.o. female with postmenopausal bleeding. Thickened lining noted on pelvic ultrasound. Unable to obtain EMB in the office.      Past Medical History:        Diagnosis Date    Anxiety and depression     Asthma     controlled     Chronic pelvic pain in female     COPD (chronic obstructive pulmonary disease) ([address]     controlled with inhalers     Left hip pain     8/10 severity    Left leg pain     8/10 severity    Neuropathy     Osteoarthritis     Post-menopausal bleeding     Thickened endometrium     Tobacco abuse      Past Surgical History:        Procedure Laterality Date    BIOPSY MOUTH LESION N/A 2020    BIOPSY DORSAL TONGUE performed by Amalia Allen MD at [address] [place] [address] Dr S      COLONOSCOPY  2019    diverticula--omari    COLONOSCOPY N/A 2019    COLORECTAL CANCER SCREENING, NOT HIGH RISK performed by Tessa Brown MD at [place] [address]  10/09/12    DILATION AND CURETTAGE OF UTERUS  3/12/13    with ablation    DILATION AND CURETTAGE OF UTERUS N/A 2019    DILATATION AND CURETTAGE HYSTEROSCOPY performed by Cr Bar MD at [facility] HYSTEROSCOPY  10/09/12    LEE  2004    OTHER SURGICAL HISTORY  2013    excision salp lesion    PA REPAIR ESOPHAGUS OPENING N/A 2020    DIRECT LARYNGOSCOPY, ESOPHAGOSCOPY performed by Amalia Allen MD at [facility]      T&A    TUBAL LIGATION           OB History    Para Term  AB Living   2 1 1 0 1 1   SAB TAB Ectopic Molar Multiple Live Births   0 0 0     1      # Outcome Date GA Lbr Zechariah/2nd Weight Sex Delivery Anes PTL Lv   2 Term 85     Vag-Spont   RON   1 AB                Medications Prior to Admission:   Medications There is a posterior uterine fibroid near the    endometrial stripe measuring 2.6 x 1.7 x 2.5 cm. . The intrauterine    cavity appears enlarged. The endometrial stripe measures 2.27 cm. The    cervix measures 2.5 cm and appears normal. There is small nabothian    cysts seen in the cervix. Both ovaries are normal in size. The right    ovary measures 3.3 x 1.5 x 1.9 l cm and the left ovary measures 1.8,    1.1, 2.1 cm. No abnormal collection of fluid is seen in the posterior    cul-de-sac.  Both adnexa are normal. The urinary bladder appears to be    normal.              Impression         Uterine fibroid which is amenable for uterine fibroid embolization         Small nabothian cyst.          ASSESSMENT AND PLAN:      44year old female with postmenopausal bleeding   - NPO, IVF  - CBC, T&S  - Consent form reviewed and signed  - Plan for discharge later today     Delano William MD

## 2020-10-20 NOTE — LETTER
[address] [facility] [address]  Phone: [phone]    No name on file. October 20, 2020    Nichole Duran was here to surgery on 10/20/2020. Her daughter was with her. She is required to have a responsible adult with her for the next 24 hours.       Sincerely,    Surgery Recovery Staff

## 2020-10-20 NOTE — ANESTHESIA PRE PROCEDURE
Department of Anesthesiology  Preprocedure Note       Name:  Iman Bah   Age:  44 y.o.  :  1970                                          MRN:  [id number]         Date:  10/20/2020      Surgeon: Gricelda Mac):  MD Zo Gotti MD    Procedure: DILATATION AND CURETTAGE HYSTEROSCOPY (N/A Vagina )    Medications prior to admission:   Prior to Admission medications    Medication Sig Start Date End Date Taking? Authorizing Provider   ibuprofen (ADVIL;MOTRIN) 600 MG tablet take 1 tablet by mouth three times a day if needed for pain 10/20/20  Yes Layne Hall MD   PARoxetine (PAXIL) 10 MG tablet Take 10 mg by mouth every morning   Yes Historical Provider, MD   Cholecalciferol (VITAMIN D3) 125 MCG (5000 UT) TABS Take by mouth daily   Yes Historical Provider, MD   Ascorbic Acid (VITAMIN C) 250 MG tablet Take 250 mg by mouth daily   Yes Historical Provider, MD   SPIRIVA RESPIMAT 2.5 MCG/ACT AERS inhaler Take daily 20  Yes Prasanna Morrow MD   QUEtiapine (SEROQUEL) 100 MG tablet Take 0.5 tablets by mouth as needed (for sleep as needed- this is prescribed psychiatrist emre cai.) 20  Yes Prasanna Morrow MD   albuterol sulfate  (90 Base) MCG/ACT inhaler inhale 2 puffs by mouth every 6 hours if needed 9/10/20 9/10/21 Yes Prasanna Morrow MD   nicotine (NICODERM CQ) 21 MG/24HR Place 1 patch onto the skin daily 20  Yes Prasanna Morrow MD   cloNIDine (CATAPRES) 0.2 MG tablet take 1 tablet by mouth at bedtime 19  Yes Historical Provider, MD   diazepam (VALIUM) 10 MG tablet Take 10 mg by mouth 3 times daily as needed.   19  Yes Historical Provider, MD       Current medications:    Current Facility-Administered Medications   Medication Dose Route Frequency Provider Last Rate Last Dose    sodium chloride flush 0.9 % injection 10 mL  10 mL Intravenous 2 times per day Marycarmen Birmingham MD        sodium chloride flush 0.9 % injection 10 mL  10 mL Intravenous PRN Hannah Wayne MD           Allergies:  No Known Allergies    Problem List:    Patient Active Problem List   Diagnosis Code    COPD, severe ([facility] J44.9    Leg pain, left M79.605    Tobacco abuse Z72.0    Incontinence of urine R32    Pelvic pain in female R10.2    Leg pain M79.606    Scalp cyst L72.9    RSD (reflex sympathetic dystrophy) G90.50    Chronic pain syndrome G89.4    Anxiety disorder F41.9    Polysubstance abuse (HCC) F19.10    ANAHY (acute kidney injury) ([facility] N17.9    Thrombocytopenia ([address] D69.6    PMB (postmenopausal bleeding) N95.0    At risk for colon cancer Z91.89    Major depressive disorder, recurrent severe without psychotic features ([address] F33.2    Moderate asthma without complication V33.095    Endometrial thickening on ultrasound R93.89       Past Medical History:        Diagnosis Date    Anxiety and depression     Asthma     controlled     Chronic pelvic pain in female     COPD (chronic obstructive pulmonary disease) ([address]     controlled with inhalers     Left hip pain     8/10 severity    Left leg pain     8/10 severity    Neuropathy     Osteoarthritis     Post-menopausal bleeding     Thickened endometrium     Tobacco abuse        Past Surgical History:        Procedure Laterality Date    BIOPSY MOUTH LESION N/A 2/7/2020    BIOPSY DORSAL TONGUE performed by Khai Barron MD at [address] [place] [address] Dr S  2011    COLONOSCOPY  05/07/2019    diverticula--omari    COLONOSCOPY N/A 5/7/2019    COLORECTAL CANCER SCREENING, NOT HIGH RISK performed by Stanley Blake MD at [place] [address]  10/09/12    DILATION AND CURETTAGE OF UTERUS  3/12/13    with ablation    DILATION AND CURETTAGE OF UTERUS N/A 4/23/2019    DILATATION AND CURETTAGE HYSTEROSCOPY performed by Pearl Peres MD at [facility] HYSTEROSCOPY  10/09/12    LEEP  2004    OTHER SURGICAL HISTORY  8/9/2013    excision salp lesion    MN REPAIR ESOPHAGUS OPENING N/A 2020    DIRECT LARYNGOSCOPY, ESOPHAGOSCOPY performed [facility] Adeline Mi [facility] at [facility]      T&A    TUBAL LIGATION         Social History:    Social History     Tobacco Use    Smoking status: Current Every Day Smoker     Packs/day: 0.25     Years: 34.00     Pack years: 8.50     Last attempt to quit: 6/10/2016     Years since quittin.3    Smokeless tobacco: Never Used    Tobacco comment: cutting down   Substance Use Topics    Alcohol use: Yes     Alcohol/week: 0.0 standard drinks     Comment: social                                 Ready to quit: Not Answered  Counseling given: Not Answered  Comment: cutting down      Vital Signs (Current):   Vitals:    10/13/20 1458 10/20/20 0811   BP:  137/87   Pulse:  73   Resp:  18   Temp:  97.1 °F (36.2 °C)   TempSrc:  Temporal   SpO2:  96%   Weight: 115 lb (52.2 kg) 115 lb (52.2 kg)   Height: 5' 1\" (1.549 m) 5' 1\" (1.549 m)                                              BP Readings from Last 3 Encounters:   10/20/20 137/87   20 133/79   20 (!) 161/86       NPO Status: Time of last liquid consumption:                         Time of last solid consumption:                         Date of last liquid consumption: 10/19/20                        Date of last solid food consumption: 10/19/20    BMI:   Wt Readings from Last 3 Encounters:   10/20/20 115 lb (52.2 kg)   20 127 lb (57.6 kg)   20 124 lb (56.2 kg)     Body mass index is 21.73 kg/m².     CBC:   Lab Results   Component Value Date    WBC 4.7 10/20/2020    RBC 4.32 10/20/2020    HGB 13.6 10/20/2020    HCT 42.1 10/20/2020    MCV 97.5 10/20/2020    RDW 12.7 10/20/2020     10/20/2020       CMP:   Lab Results   Component Value Date     10/23/2019    K 4.2 10/23/2019     10/23/2019    CO2 34 10/23/2019    BUN 17 10/23/2019    CREATININE 0.9 10/23/2019    GFRAA >60 10/23/2019    LABGLOM >60 10/23/2019    GLUCOSE 82 10/23/2019 GLUCOSE 80 05/27/2011    PROT 7.4 02/20/2018    CALCIUM 9.9 10/23/2019    BILITOT 0.5 02/20/2018    ALKPHOS 59 02/20/2018    AST 19 02/20/2018    ALT 11 02/20/2018       POC Tests: No results for input(s): POCGLU, POCNA, POCK, POCCL, POCBUN, POCHEMO, POCHCT in the last 72 hours. Coags:   Lab Results   Component Value Date    PROTIME 11.0 12/17/2017    INR 1.0 12/17/2017    APTT <20.0 06/16/2016       HCG (If Applicable):   Lab Results   Component Value Date    PREGTESTUR negative 12/14/2017        ABGs: No results found for: PHART, PO2ART, CZY5VOE, WDF3YUA, BEART, J1MQGLPK     Type & Screen (If Applicable):  No results found for: LABABO, [address]    Anesthesia Evaluation  Patient summary reviewed and Nursing notes reviewed no history of anesthetic complications:   Airway: Mallampati: III  TM distance: >3 FB   Neck ROM: full  Mouth opening: > = 3 FB Dental:    (+) edentulous      Pulmonary: breath sounds clear to auscultation  (+) COPD:  asthma ( uses albuterol inhaler PRN): current smoker    Shortness of breath:  patient denies SOB. Cardiovascular:    (+) hypertension:,     (-) past MI, CAD and CABG/stent      Rhythm: regular  Rate: normal                    Neuro/Psych:   (+) neuromuscular disease:, headaches:, psychiatric history:depression/anxiety             GI/Hepatic/Renal:        (-) GERD and PUD       Endo/Other:    (+) : arthritis:., .                  ROS comment: ENT procedure to remove tongue lesion in early 2020; No current issues Abdominal:           Vascular: negative vascular ROS. Anesthesia Plan      general     ASA 3       Induction: intravenous. Anesthetic plan and risks discussed with patient. Plan discussed with CRNA.             Yusra Mckeon  10/20/2020  9:20 AM

## 2020-11-05 ENCOUNTER — OFFICE VISIT (OUTPATIENT)
Dept: OBGYN | Age: 50
End: 2020-11-05
Payer: COMMERCIAL

## 2020-11-05 VITALS
WEIGHT: 125 LBS | DIASTOLIC BLOOD PRESSURE: 81 MMHG | TEMPERATURE: 98 F | SYSTOLIC BLOOD PRESSURE: 134 MMHG | BODY MASS INDEX: 23.6 KG/M2 | HEART RATE: 92 BPM | HEIGHT: 61 IN

## 2020-11-05 PROCEDURE — 99212 OFFICE O/P EST SF 10 MIN: CPT | Performed by: OBSTETRICS & GYNECOLOGY

## 2020-11-05 PROCEDURE — 3017F COLORECTAL CA SCREEN DOC REV: CPT | Performed by: OBSTETRICS & GYNECOLOGY

## 2020-11-05 PROCEDURE — 99213 OFFICE O/P EST LOW 20 MIN: CPT | Performed by: OBSTETRICS & GYNECOLOGY

## 2020-11-05 PROCEDURE — G8482 FLU IMMUNIZE ORDER/ADMIN: HCPCS | Performed by: OBSTETRICS & GYNECOLOGY

## 2020-11-05 PROCEDURE — 4004F PT TOBACCO SCREEN RCVD TLK: CPT | Performed by: OBSTETRICS & GYNECOLOGY

## 2020-11-05 PROCEDURE — G8420 CALC BMI NORM PARAMETERS: HCPCS | Performed by: OBSTETRICS & GYNECOLOGY

## 2020-11-05 PROCEDURE — G8427 DOCREV CUR MEDS BY ELIG CLIN: HCPCS | Performed by: OBSTETRICS & GYNECOLOGY

## 2020-11-05 NOTE — PROGRESS NOTES
Patient alert and pleasant with complaints of trouble with urination   Urine for culture obtained, labeled and sent to lab  Discharge instructions have been discussed with the patient. Patient advised to call our office with any questions or concerns. Voiced understanding.

## 2020-11-05 NOTE — PROGRESS NOTES
Lia Hazel     Patient presents for postop visit. Patient is s/p [employer] 10/20/20 due to PMB. Uterine polyp was noted during surgery. Pathology consistent with benign polyp, scant fragment of endometrium. Discussed that bleeding was likely from polyp, as endometrium was atrophic on hysteroscopy. Patient has has some discomfort with urination, states she sometimes has a hard time starting her stream. This is new in the past two weeks. Patient has not had bleeding. Denies pain. Patient requesting mammogram be ordered, she will go in March.      Past Medical History:   Diagnosis Date    Anxiety and depression     Asthma     controlled     Chronic pelvic pain in female     COPD (chronic obstructive pulmonary disease) ([address]     controlled with inhalers     Left hip pain     8/10 severity    Left leg pain     8/10 severity    Neuropathy     Osteoarthritis     Post-menopausal bleeding     Thickened endometrium     Tobacco abuse         Past Surgical History:   Procedure Laterality Date    BIOPSY MOUTH LESION N/A 2/7/2020    BIOPSY DORSAL TONGUE performed by Giuseppe Goodrich MD at [address] [place] [address] Dr S  2011    COLONOSCOPY  05/07/2019    diverticula--omari    COLONOSCOPY N/A 5/7/2019    COLORECTAL CANCER SCREENING, NOT HIGH RISK performed by Macy Toscano MD at [place] [address]  10/09/12    DILATION AND CURETTAGE OF UTERUS  3/12/13    with ablation    DILATION AND CURETTAGE OF UTERUS N/A 4/23/2019    DILATATION AND CURETTAGE HYSTEROSCOPY performed by Rosy Mckenna MD at [facility] N/A 10/20/2020    DILATATION AND CURETTAGE HYSTEROSCOPY performed by Isela Jolley MD at [facility] HYSTEROSCOPY  10/09/12    LEEP  2004    OTHER SURGICAL HISTORY  8/9/2013    excision salp lesion    AL REPAIR ESOPHAGUS OPENING N/A 2/7/2020    DIRECT LARYNGOSCOPY, ESOPHAGOSCOPY performed by Giuseppe Goodrich MD at [address] [place] [address]  TONSILLECTOMY      T&A    TUBAL LIGATION          Family History   Problem Relation Age of Onset    Alcohol Abuse Father     COPD Mother     Osteoporosis Mother    [facility] Arthritis Mother     Diabetes Mother     High Blood Pressure Mother     High Blood Pressure Sister     High Cholesterol Sister     Substance Abuse Sister     Alcohol Abuse Sister     Arthritis Maternal Aunt     Arthritis Maternal Uncle     Arthritis Maternal Grandmother     Cancer Maternal Grandmother     High Blood Pressure Maternal Grandmother     Arthritis Maternal Grandfather     Cancer Maternal Grandfather     Substance Abuse Paternal Grandfather         Social History     Tobacco History     Smoking Status  Current Every Day Smoker Last attempt to quit  6/10/2016 Smoking Frequency  0.25 packs/day for 34 years (8.5 pk yrs)    Smokeless Tobacco Use  Never Used    Tobacco Comment  cutting down          Alcohol History     Alcohol Use Status  Yes Drinks/Week  0 Cans of beer, 0 Shots of liquor, 0 Standard drinks or equivalent per week Amount  0.0 standard drinks of alcohol/wk Comment  social           Drug Use     Drug Use Status  No Comment  in her 25s          Sexual Activity     Sexually Active  Yes Partners  Male                  Current Outpatient Medications:     PARoxetine (PAXIL) 10 MG tablet, Take 10 mg by mouth every morning, Disp: , Rfl:     Cholecalciferol (VITAMIN D3) 125 MCG (5000 UT) TABS, Take by mouth daily, Disp: , Rfl:     Ascorbic Acid (VITAMIN C) 250 MG tablet, Take 250 mg by mouth daily, Disp: , Rfl:     SPIRIVA RESPIMAT 2.5 MCG/ACT AERS inhaler, Take daily, Disp: 4 g, Rfl: 2    albuterol sulfate  (90 Base) MCG/ACT inhaler, inhale 2 puffs by mouth every 6 hours if needed, Disp: 18 g, Rfl: 5    cloNIDine (CATAPRES) 0.2 MG tablet, take 1 tablet by mouth at bedtime, Disp: , Rfl: 0    diazepam (VALIUM) 10 MG tablet, Take 10 mg by mouth 3 times daily as needed.  , Disp: , Rfl: 0    ibuprofen (ADVIL;MOTRIN) 600 MG tablet, take 1 tablet by mouth three times a day if needed for pain, Disp: 90 tablet, Rfl: 1    QUEtiapine (SEROQUEL) 100 MG tablet, Take 0.5 tablets by mouth as needed (for sleep as needed- this is prescribed psychiatrist emre cai.) (Patient not taking: Reported on 11/5/2020), Disp: 60 tablet, Rfl: 0    nicotine (NICODERM CQ) 21 MG/24HR, Place 1 patch onto the skin daily (Patient not taking: Reported on 11/5/2020), Disp: 30 patch, Rfl: 1     No Known Allergies     Vitals:    11/05/20 1254   BP: 134/81   Pulse: 92   Temp: 98 °F (36.7 °C)        Physical Exam:  General: pleasant, alert     Breasts: deferred     Pelvic exam: deferred     Harmony Damon was seen today for post-op check. Diagnoses and all orders for this visit:    Dysuria  -     Culture, Urine; Future    Postop check    Will notify if results are positive. Advised to call with questions or concerns. Return for annual in March .      Lucho Jones MD

## 2020-11-07 LAB — URINE CULTURE, ROUTINE: NORMAL

## 2020-12-16 ENCOUNTER — VIRTUAL VISIT (OUTPATIENT)
Dept: FAMILY MEDICINE CLINIC | Age: 50
End: 2020-12-16
Payer: COMMERCIAL

## 2020-12-16 ENCOUNTER — TELEPHONE (OUTPATIENT)
Dept: FAMILY MEDICINE CLINIC | Age: 50
End: 2020-12-16

## 2020-12-16 PROCEDURE — 3017F COLORECTAL CA SCREEN DOC REV: CPT | Performed by: FAMILY MEDICINE

## 2020-12-16 PROCEDURE — G8427 DOCREV CUR MEDS BY ELIG CLIN: HCPCS | Performed by: FAMILY MEDICINE

## 2020-12-16 PROCEDURE — 99213 OFFICE O/P EST LOW 20 MIN: CPT | Performed by: STUDENT IN AN ORGANIZED HEALTH CARE EDUCATION/TRAINING PROGRAM

## 2020-12-16 RX ORDER — TIZANIDINE 2 MG/1
2 TABLET ORAL 2 TIMES DAILY PRN
Qty: 20 TABLET | Refills: 0 | Status: SHIPPED
Start: 2020-12-16 | End: 2021-11-16

## 2020-12-16 RX ORDER — FLUTICASONE PROPIONATE 50 MCG
2 SPRAY, SUSPENSION (ML) NASAL NIGHTLY
Qty: 1 BOTTLE | Refills: 2 | Status: SHIPPED
Start: 2020-12-16 | End: 2021-09-20

## 2020-12-16 RX ORDER — ACETAMINOPHEN 325 MG/1
TABLET ORAL
Qty: 30 TABLET | Refills: 1 | COMMUNITY
Start: 2020-12-16 | End: 2021-07-20 | Stop reason: ALTCHOICE

## 2020-12-16 RX ORDER — TIOTROPIUM BROMIDE INHALATION SPRAY 3.12 UG/1
SPRAY, METERED RESPIRATORY (INHALATION)
Qty: 4 G | Refills: 2 | Status: SHIPPED
Start: 2020-12-16 | End: 2021-06-11 | Stop reason: SDUPTHER

## 2020-12-16 RX ORDER — ALBUTEROL SULFATE 90 UG/1
AEROSOL, METERED RESPIRATORY (INHALATION)
Qty: 18 G | Refills: 5 | Status: SHIPPED
Start: 2020-12-16 | End: 2021-06-11 | Stop reason: SDUPTHER

## 2020-12-16 NOTE — TELEPHONE ENCOUNTER
Attempted to call patient to add her to the schedule as a virtual appointment for today, left message for her to call in to be added.

## 2020-12-16 NOTE — PROGRESS NOTES
TELEHEALTH VIDEO VISIT    HPI:    Salinas Calloway (:  1970) has requested an audio/video evaluation for the following concern(s):    Chief Complaint   Patient presents with    Cough    Headache     right side of head painful and tender to touch cant turn head neck painful     She has had a cough for >1 month. Clear sputum. No sinus pressure, tenderness, fevers, chills, sick contact, sob or wheezing. Feels COPD is controlled with her spiriva and albuterol. Got a COVID-19 test last week but its not in results panel, she said it was at a drive-through [facility]. She feels cough is throughout the day and sometimes persistent/worse other times does not cough at all. No inciting trigger. Hx of nicotine use 34 pack years    Right-sided neck pain 1 week. Woke-up with tightness and spasm of right-side neck. No midline pain, neck stiffness, or decreased ROM. No recent illness. Tried heating pad with no relief. Does not want to go to PT at this time bc of pandemic. Review of Systems   Constitutional: Negative for activity change and appetite change. HENT: Negative for congestion and sore throat. Eyes: Negative for pain and discharge. Respiratory: Positive for cough. Negative for choking, chest tightness, shortness of breath and stridor. Cardiovascular: Negative for chest pain and leg swelling. Gastrointestinal: Negative for abdominal pain, diarrhea, nausea and vomiting. Genitourinary: Negative for difficulty urinating and dysuria. Musculoskeletal: Negative for arthralgias and back pain. Neurological: Negative for light-headedness and headaches. Psychiatric/Behavioral: Negative for agitation and confusion. Prior to Visit Medications    Medication Sig Taking?  Authorizing Provider   ibuprofen (ADVIL;MOTRIN) 600 MG tablet take 1 tablet by mouth three times a day if needed for pain Yes Johnie Montano MD PARoxetine (PAXIL) 10 MG tablet Take 10 mg by mouth every morning Yes Historical Provider, MD   Cholecalciferol (VITAMIN D3) 125 MCG (5000 UT) TABS Take by mouth daily Yes Historical Provider, MD   Ascorbic Acid (VITAMIN C) 250 MG tablet Take 250 mg by mouth daily Yes Historical Provider, MD Noemi Kelly 2.5 MCG/ACT AERS inhaler Take daily Yes Satinder Fair MD   albuterol sulfate  (90 Base) MCG/ACT inhaler inhale 2 puffs by mouth every 6 hours if needed Yes Satinder Fair MD   cloNIDine (CATAPRES) 0.2 MG tablet take 1 tablet by mouth at bedtime Yes Historical Provider, MD   diazepam (VALIUM) 10 MG tablet Take 10 mg by mouth 3 times daily as needed. Yes Historical Provider, MD   QUEtiapine (SEROQUEL) 100 MG tablet Take 0.5 tablets by mouth as needed (for sleep as needed- this is prescribed psychiatrist emre cai.)  Patient not taking: Reported on 2020  Satinder Fair MD   nicotine (NICODERM CQ) 21 MG/24HR Place 1 patch onto the skin daily  Patient not taking: Reported on 2020  Satinder Fair MD       Social History     Tobacco Use    Smoking status: Current Every Day Smoker     Packs/day: 0.25     Years: 34.00     Pack years: 8.50     Last attempt to quit: 6/10/2016     Years since quittin.5    Smokeless tobacco: Never Used    Tobacco comment: cutting down   Substance Use Topics    Alcohol use: Yes     Alcohol/week: 0.0 standard drinks     Comment: social     Drug use: No     Types: Marijuana     Comment: in her 25s        PHYSICAL EXAMINATION:    Constitutional: [x] Appears well-developed and well-nourished [] No apparent distress      [] Abnormal-   Mental status  [x] Alert and awake  [] Oriented to person/place/time []Able to follow commands      Eyes:  EOM    [x]  Normal  [] Abnormal-  HENT:   [x] Normocephalic, atraumatic. Neck: [x] No visualized mass     Other pertinent observable physical exam findings-     ASSESSMENT/PLAN:  1.  Cough - XR CHEST (2 VW); Future  - COVID-19 Ambulatory; Future    2. History of nicotine use    - XR CHEST (2 VW); Future    3. Neck pain    - tiZANidine (ZANAFLEX) 2 MG tablet; Take 1 tablet by mouth 2 times daily as needed (neck tightness)  Dispense: 20 tablet; Refill: 0  - acetaminophen (TYLENOL) 325 MG tablet; Use as needed for sinus congestion  Dispense: 30 tablet; Refill: 1    4. Medication refill    - SPIRIVA RESPIMAT 2.5 MCG/ACT AERS inhaler; Take daily  Dispense: 4 g; Refill: 2  - albuterol sulfate  (90 Base) MCG/ACT inhaler; inhale 2 puffs by mouth every 6 hours if needed  Dispense: 18 g; Refill: 5  - XR CHEST (2 VW); Future  - COVID-19 Ambulatory; Future      No follow-ups on file. TeleMedicine video visit    This visit was performed as a virtual video visit using a synchronous, two-way, audio-video telehealth technology platform. Patient identification was verified at the start of the visit, including the patient's telephone number and physical location. I discussed with the patient the nature of our telehealth visits, that:     1. Due to the nature of an audio- video modality, the only components of a physical exam that could be done are the elements supported by direct observation. 2. I would evaluate the patient and recommend diagnostics and treatments based on my assessment. 3. If it was felt that the patient should be evaluated in clinic or an emergency room setting, then they would be directed there. 4. Our sessions are not being recorded and that personal health information is protected. 5. Our team would provide follow up care in person if/when the patient needs it. Patient does agree to proceed with telemedicine consultation. Patient's location: home address in [facility]  Physician  location [facility] other people involved in call        Time spent: Greater than 20    This visit was completed virtually using [email or web address]. me --Karla Blanton MD on 12/16/2020 at 3:56 PM    An electronic signature was used to authenticate this note.

## 2020-12-16 NOTE — TELEPHONE ENCOUNTER
Thank you, if my 3 p.m. does not show- I will be putting Carmen Samuel on the schedule for VV for her. Thank you so so much! Her phone Coni Bautista) has been disconnected so we may have to keep that # (daughters number) on her chart moving forward. ..     Thank youu

## 2020-12-16 NOTE — PROGRESS NOTES
TeleMedicine Patient Consent    This visit was performed as a virtual video visit using a synchronous, two-way, audio-video telehealth technology platform. Patient identification was verified at the start of the visit, including the patient's telephone number and physical location. I discussed with the patient the nature of our telehealth visits, that:     1. Due to the nature of an audio- video modality, the only components of a physical exam that could be done are the elements supported by direct observation. 2. I would evaluate the patient and recommend diagnostics and treatments based on my assessment. 3. If it was felt that the patient should be evaluated in clinic or an emergency room setting, then they would be directed there. 4. Our sessions are not being recorded and that personal health information is protected. 5. Our team would provide follow up care in person if/when the patient needs it. Patient does agree to proceed with telemedicine consultation. Patient's location: home address in [place]. Is there anyone else present for this visit: No  This visit was completed virtually using [email or web address]    Physician Location:   [address] [facility], [address] [address]    Time spent: Greater than Not billed by time    2020    TELEHEALTH EVALUATION -- Audio or Visual (During WFOJC-38 public health emergency)    HPI:    Lizette Newman (:  1970) has requested an audio/video evaluation for the following concern(s):  prerceptor note    Right sided neck pain- 1 week, tightness, heating pads with no ROS. Denies fever, chills, nausea or vomiting, trauma. Cough for 1 month plus. States was negative for COVID in October and 1 week ago. Clear sputum. No CP, diaphoresis, SOB, palp, HA, sore throat. Known COPD. Is on spiriva and albuterol.      ROS Unless otherwise specified Review of Systems - General ROS: negative for - chills, fatigue, fever, night sweats, sleep disturbance, weight gain or weight loss  Psychological ROS: negative for - anxiety, behavioral disorder, depression, hallucinations, irritability, memory difficulties, mood swings, sleep disturbances or suicidal ideation  ENT ROS: negative for - epistaxis, headaches, hearing change, nasal congestion, nasal discharge, nasal polyps, sinus pain, tinnitus, vertigo or visual changes  Hematological and Lymphatic ROS: negative for - bleeding problems, blood clots, fatigue or swollen lymph nodes  Respiratory ROS: negative for - cough, orthopnea, shortness of breath, sputum changes, tachypnea or wheezing  Cardiovascular ROS: negative for - chest pain, dyspnea on exertion, irregular heartbeat, loss of consciousness, palpitations, paroxysmal nocturnal dyspnea or rapid heart rate  Gastrointestinal ROS: negative for - abdominal pain, blood in stools, change in bowel habits, constipation, diarrhea, gas/bloating, heartburn or nausea/vomiting  Musculoskeletal ROS: negative for - joint pain, joint stiffness, joint swelling or muscle, back pain, bowel or bladder incontinence  Neurological ROS: negative for - behavioral changes, confusion, dizziness, headaches, memory loss, numbness/tingling, seizures or speech problems, weakness  Dermatological ROS: negative for - dry skin, mole changes, nail changes, pruritus, rash or skin lesion changes    Prior to Visit Medications    Medication Sig Taking?  Authorizing Provider   ibuprofen (ADVIL;MOTRIN) 600 MG tablet take 1 tablet by mouth three times a day if needed for pain Yes María Maciel MD   PARoxetine (PAXIL) 10 MG tablet Take 10 mg by mouth every morning Yes Historical Provider, MD   Cholecalciferol (VITAMIN D3) 125 MCG (5000 UT) TABS Take by mouth daily Yes Historical Provider, MD   Ascorbic Acid (VITAMIN C) 250 MG tablet Take 250 mg by mouth daily Yes Historical Provider, MD SPIRIVA RESPIMAT 2.5 MCG/ACT AERS inhaler Take daily Yes Mirta Gómez MD   albuterol sulfate  (90 Base) MCG/ACT inhaler inhale 2 puffs by mouth every 6 hours if needed Yes Blue Guerra MD   cloNIDine (CATAPRES) 0.2 MG tablet take 1 tablet by mouth at bedtime Yes Historical Provider, MD   diazepam (VALIUM) 10 MG tablet Take 10 mg by mouth 3 times daily as needed. Yes Historical Provider, MD   QUEtiapine (SEROQUEL) 100 MG tablet Take 0.5 tablets by mouth as needed (for sleep as needed- this is prescribed psychiatrist emrejose cai.)  Patient not taking: Reported on 2020  Blue Guerra MD   nicotine (NICODERM CQ) 21 MG/24HR Place 1 patch onto the skin daily  Patient not taking: Reported on 2020  Blue Guerra MD       Social History     Tobacco Use    Smoking status: Current Every Day Smoker     Packs/day: 0.25     Years: 34.00     Pack years: 8.50     Last attempt to quit: 6/10/2016     Years since quittin.5    Smokeless tobacco: Never Used    Tobacco comment: cutting down   Substance Use Topics    Alcohol use:  Yes     Alcohol/week: 0.0 standard drinks     Comment: social     Drug use: No     Types: Marijuana     Comment: in her 25s        No Known Allergies,   Past Medical History:   Diagnosis Date    Anxiety and depression     Asthma     controlled     Chronic pelvic pain in female     COPD (chronic obstructive pulmonary disease) ([facility] [address]     controlled with inhalers     Left hip pain     8/10 severity    Left leg pain     8/10 severity    Neuropathy     Osteoarthritis     Post-menopausal bleeding     Thickened endometrium     Tobacco abuse    ,   Past Surgical History:   Procedure Laterality Date    BIOPSY MOUTH LESION N/A 2020    BIOPSY DORSAL TONGUE performed by Jake Mota MD at [address] [place] [address] COLONOSCOPY  2019    diverticula--omari    COLONOSCOPY N/A 2019 COLORECTAL CANCER SCREENING, NOT HIGH RISK performed by Macy Toscano MD at [facility]  10/09/12    DILATION AND CURETTAGE OF UTERUS  3/12/13    with ablation    DILATION AND CURETTAGE OF UTERUS N/A 2019    DILATATION AND CURETTAGE HYSTEROSCOPY performed by Rosy Mckenna MD at [facility] [address] [place] [address] N N/A 10/20/2020    DILATATION AND CURETTAGE HYSTEROSCOPY performed by Isela Jolely MD at [facility] HYSTEROSCOPY  10/09/12    LEEP  2004    OTHER SURGICAL HISTORY  2013    excision salp lesion    HI REPAIR ESOPHAGUS OPENING N/A 2020    DIRECT LARYNGOSCOPY, ESOPHAGOSCOPY performed by Giuseppe Goodrich MD at [facility]      T&A    TUBAL LIGATION     ,   Social History     Tobacco Use    Smoking status: Current Every Day Smoker     Packs/day: 0.25     Years: 34.00     Pack years: 8.50     Last attempt to quit: 6/10/2016     Years since quittin.5    Smokeless tobacco: Never Used    Tobacco comment: cutting down   Substance Use Topics    Alcohol use:  Yes     Alcohol/week: 0.0 standard drinks     Comment: social     Drug use: No     Types: Marijuana     Comment: in her 25s   ,   Family History   Problem Relation Age of Onset    Alcohol Abuse Father     COPD Mother     Osteoporosis Mother     Arthritis Mother     Diabetes Mother     High Blood Pressure Mother     High Blood Pressure Sister     High Cholesterol Sister     Substance Abuse Sister     Alcohol Abuse Sister     Arthritis Maternal Aunt     Arthritis Maternal Uncle     Arthritis Maternal Grandmother     Cancer Maternal Grandmother     High Blood Pressure Maternal Grandmother     Arthritis Maternal Grandfather     Cancer Maternal Grandfather     Substance Abuse Paternal Grandfather    ,   Immunization History   Administered Date(s) Administered    Influenza A (H5N1) Monovalent Vaccine, Adjuvanted-2017  Influenza Vaccine, unspecified formulation 11/01/2016    Influenza Virus Vaccine 10/29/2014, 09/15/2020    Influenza, Quadv, IM, PF (6 mo and older Fluzone, Flulaval, Fluarix, and 3 yrs and older Afluria) 08/12/2019, 09/15/2020    Pneumococcal Polysaccharide ([id number]) 01/12/2017    Zoster Recombinant (Shingrix) 09/15/2020   ,   Health Maintenance   Topic Date Due    Shingles Vaccine (2 of 2) 11/10/2020    Breast cancer screen  03/12/2021    Cervical cancer screen  03/12/2022    Lipid screen  12/28/2022    Colon cancer screen colonoscopy  05/07/2029    DTaP/Tdap/Td vaccine (2 - Td) 05/15/2029    Flu vaccine  Completed    Pneumococcal 0-64 years Vaccine  Completed    HIV screen  Completed    Hepatitis A vaccine  Aged Out    Hepatitis B vaccine  Aged Out    Hib vaccine  Aged Out    Meningococcal (ACWY) vaccine  Aged Out       PHYSICAL EXAMINATION:  [ INSTRUCTIONS:  \"[x]\" Indicates a positive item  \"[]\" Indicates a negative item  -- DELETE ALL ITEMS NOT EXAMINED]  Vital Signs: (As obtained by patient/caregiver or practitioner observation)    Blood pressure-  Heart rate-    Respiratory rate-    Temperature-  Pulse oximetry-     Constitutional: [x] Appears well-developed and well-nourished [] No apparent distress      [x] Abnormal-   Mental status  [x] Alert and awake  [x] Oriented to person/place/time [x]Able to follow commands      Eyes:  EOM    [x]  Normal  [] Abnormal-  Sclera  [x]  Normal  [] Abnormal -         Discharge [x]  None visible  [] Abnormal -    HENT:   [x] Normocephalic, atraumatic.   [] Abnormal   [x] Mouth/Throat: Mucous membranes are moist.     External Ears [x] Normal  [] Abnormal-     Neck: [x] No visualized mass     Pulmonary/Chest: [x] Respiratory effort normal.  [x] No visualized signs of difficulty breathing or respiratory distress        [] Abnormal-      Musculoskeletal:   [] Normal gait with no signs of ataxia         [x] Normal range of motion of neck [] Abnormal-       Neurological:        [x] No Facial Asymmetry (Cranial nerve 7 motor function) (limited exam to video visit)          [] No gaze palsy        [] Abnormal-         Skin:        [x] No significant exanthematous lesions or discoloration noted on facial skin         [] Abnormal-            Psychiatric:       [x] Normal Affect [x] No Hallucinations        [] Abnormal-       Other pertinent observable physical exam findings-     Due to this being a TeleHealth encounter, evaluation of the following organ systems is limited: Vitals/Constitutional/EENT/Resp/CV/GI//MS/Neuro/Skin/Heme-Lymph-Imm. ASSESSMENT/PLAN:  1. COPD, severe (HCC)/Cough/ COVID  CXR, covid  - SPIRIVA RESPIMAT 2.5 MCG/ACT AERS inhaler; Take daily  Dispense: 4 g; Refill: 2  - albuterol sulfate  (90 Base) MCG/ACT inhaler; inhale 2 puffs by mouth every 6 hours if needed  Dispense: 18 g; Refill: 5    3. Neck pain  - tiZANidine (ZANAFLEX) 2 MG tablet; Take 1 tablet by mouth 2 times daily as needed (neck tightness)  Dispense: 20 tablet; Refill: 0      No follow-ups on file. An  electronic signature was used to authenticate this note. --Harpreet Christian DO on 12/16/2020 at 4:44 }    Pursuant to the emergency declaration under the [facility], [facility] waiver authority and the [employer] Act, this Virtual  Visit was conducted, with patient's consent, to reduce the patient's risk of exposure to COVID-19 and provide continuity of care for an established patient. Services were provided through a video synchronous discussion virtually to substitute for in-person clinic visit.

## 2020-12-16 NOTE — TELEPHONE ENCOUNTER
I also tried to call Gómez Dav at her daughters #:    [phone]    Noone picked up and I left a voicemail. I will try again at 3:20 p.m. Allyson Johnson

## 2020-12-16 NOTE — TELEPHONE ENCOUNTER
Patient p[honed stated that she had an appointment with doctor on Monday but could not get phone to work  Patient stated doctor wants her on the schedule for today at 320   Patients new number is [phone]  Please call that number

## 2020-12-17 ASSESSMENT — ENCOUNTER SYMPTOMS
CHEST TIGHTNESS: 0
SHORTNESS OF BREATH: 0
NAUSEA: 0
CHOKING: 0
EYE PAIN: 0
STRIDOR: 0
VOMITING: 0
BACK PAIN: 0
COUGH: 1
DIARRHEA: 0
EYE DISCHARGE: 0
SORE THROAT: 0
ABDOMINAL PAIN: 0

## 2021-01-24 DIAGNOSIS — F33.2 MAJOR DEPRESSIVE DISORDER, RECURRENT SEVERE WITHOUT PSYCHOTIC FEATURES (HCC): ICD-10-CM

## 2021-01-26 RX ORDER — QUETIAPINE FUMARATE 100 MG/1
TABLET, FILM COATED ORAL
Qty: 60 TABLET | Refills: 0 | Status: ON HOLD
Start: 2021-01-26 | End: 2021-09-27 | Stop reason: HOSPADM

## 2021-06-11 ENCOUNTER — OFFICE VISIT (OUTPATIENT)
Dept: FAMILY MEDICINE CLINIC | Age: 51
End: 2021-06-11
Payer: COMMERCIAL

## 2021-06-11 VITALS
OXYGEN SATURATION: 95 % | RESPIRATION RATE: 16 BRPM | WEIGHT: 121 LBS | DIASTOLIC BLOOD PRESSURE: 90 MMHG | SYSTOLIC BLOOD PRESSURE: 155 MMHG | HEART RATE: 81 BPM | BODY MASS INDEX: 23.75 KG/M2 | HEIGHT: 60 IN | TEMPERATURE: 95.5 F

## 2021-06-11 DIAGNOSIS — Z76.0 MEDICATION REFILL: ICD-10-CM

## 2021-06-11 PROCEDURE — 99212 OFFICE O/P EST SF 10 MIN: CPT | Performed by: STUDENT IN AN ORGANIZED HEALTH CARE EDUCATION/TRAINING PROGRAM

## 2021-06-11 PROCEDURE — G8427 DOCREV CUR MEDS BY ELIG CLIN: HCPCS | Performed by: FAMILY MEDICINE

## 2021-06-11 PROCEDURE — 99213 OFFICE O/P EST LOW 20 MIN: CPT | Performed by: STUDENT IN AN ORGANIZED HEALTH CARE EDUCATION/TRAINING PROGRAM

## 2021-06-11 PROCEDURE — G8420 CALC BMI NORM PARAMETERS: HCPCS | Performed by: FAMILY MEDICINE

## 2021-06-11 PROCEDURE — 4004F PT TOBACCO SCREEN RCVD TLK: CPT | Performed by: FAMILY MEDICINE

## 2021-06-11 PROCEDURE — 3017F COLORECTAL CA SCREEN DOC REV: CPT | Performed by: FAMILY MEDICINE

## 2021-06-11 RX ORDER — ALBUTEROL SULFATE 90 UG/1
AEROSOL, METERED RESPIRATORY (INHALATION)
Qty: 18 G | Refills: 5 | Status: SHIPPED | OUTPATIENT
Start: 2021-06-11 | End: 2021-09-14 | Stop reason: SDUPTHER

## 2021-06-11 RX ORDER — TIOTROPIUM BROMIDE INHALATION SPRAY 3.12 UG/1
SPRAY, METERED RESPIRATORY (INHALATION)
Qty: 4 G | Refills: 2 | Status: SHIPPED | OUTPATIENT
Start: 2021-06-11 | End: 2021-09-14 | Stop reason: SDUPTHER

## 2021-06-11 NOTE — PROGRESS NOTES
Attending Physician Statement    S:   Chief Complaint   Patient presents with    Other     medical clearance 06/17/21      Pre-op evaluation for left foot bunion surgery   Hx of polysubstance abuse, COPD. No known heart, kidney, or DM disease   Some MEJIA. Has had prior C-sections and hx of DVT  O: Blood pressure (!) 155/90, pulse 81, temperature 95.5 °F (35.3 °C), temperature source Cerebral, resp. rate 16, height 5' (1.524 m), weight 121 lb (54.9 kg), last menstrual period 12/14/2017, SpO2 95 %, not currently breastfeeding. Exam:   Heart - RRR   Lungs - wheezes and prolonged expiration   Ext- pulses intact  A: COPD, polysubstance abuse  P:  Low risk surgery - ok for procedure   Follow-up as ordered    I have discussed the case, including pertinent history and exam findings with the resident. I agree with the documented assessment and plan.

## 2021-06-11 NOTE — PROGRESS NOTES
Preoperative Consultation      Rosemary Mauro  YOB: 1970    Date of Service:  6/11/2021    Vitals:    06/11/21 1029 06/11/21 1035   BP: (!) 168/83 (!) 155/90   Site: Right Upper Arm Right Upper Arm   Position: Sitting Sitting   Cuff Size: Medium Adult Medium Adult   Pulse: 81    Resp: 16    Temp: 95.5 °F (35.3 °C)    TempSrc: Cerebral    SpO2: 95%    Weight: 121 lb (54.9 kg)    Height: 5' (1.524 m)       Wt Readings from Last 2 Encounters:   06/11/21 121 lb (54.9 kg)   11/05/20 125 lb (56.7 kg)     BP Readings from Last 3 Encounters:   06/11/21 (!) 155/90   11/05/20 134/81   10/20/20 138/74        Chief Complaint   Patient presents with    Other     medical clearance 06/17/21     No Known Allergies  Outpatient Medications Marked as Taking for the 6/11/21 encounter (Office Visit) with Mathieu Dunham MD   Medication Sig Dispense Refill    SPIRIVA RESPIMAT 2.5 MCG/ACT AERS inhaler Take daily 4 g 2    albuterol sulfate  (90 Base) MCG/ACT inhaler inhale 2 puffs by mouth every 6 hours if needed 18 g 5    acetaminophen (TYLENOL) 325 MG tablet Use as needed for sinus congestion 30 tablet 1    ibuprofen (ADVIL;MOTRIN) 600 MG tablet take 1 tablet by mouth three times a day if needed for pain 90 tablet 1    PARoxetine (PAXIL) 10 MG tablet Take 10 mg by mouth every morning      Cholecalciferol (VITAMIN D3) 125 MCG (5000 UT) TABS Take by mouth daily      Ascorbic Acid (VITAMIN C) 250 MG tablet Take 250 mg by mouth daily      cloNIDine (CATAPRES) 0.2 MG tablet take 1 tablet by mouth at bedtime  0    diazepam (VALIUM) 10 MG tablet Take 10 mg by mouth 3 times daily as needed. 0       This patient presents to the office today for a preoperative consultation at the request of surgeon, Dr. Levi Pfeiffer, who plans on performing bunion corrective surgery on left foot on June 17 at surgical center on [facility]. Patient denies any complaints. On further questioning MET's about 4.  Exertional dyspnea baseline. Current smoker. Has COPD. Has history of DVT. Patient denies chest pain, states her cough sputum production dyspnea on exertion is at baseline. Denies fever nausea vomiting chills. Has been eating and drinking normally. She feels at baseline.     Planned anesthesia: General   Known anesthesia problems: None   Bleeding risk: No recent or remote history of abnormal bleeding  Personal or FH of DVT/PE: No    Patient objection to receiving blood products: No    Patient Active Problem List   Diagnosis    COPD, severe ([address]    Leg pain, left    Tobacco abuse    Incontinence of urine    Pelvic pain in female    Leg pain    Scalp cyst    RSD (reflex sympathetic dystrophy)    Chronic pain syndrome    Anxiety disorder    Polysubstance abuse ([address]    ANAHY (acute kidney injury) ([address]    Thrombocytopenia ([address]    PMB (postmenopausal bleeding)    At risk for colon cancer    Major depressive disorder, recurrent severe without psychotic features ([address]    Moderate asthma without complication    Endometrial thickening on ultrasound       Past Medical History:   Diagnosis Date    Anxiety and depression     Asthma     controlled     Chronic pelvic pain in female     COPD (chronic obstructive pulmonary disease) ([address]     controlled with inhalers     Left hip pain     8/10 severity    Left leg pain     8/10 severity    Neuropathy     Osteoarthritis     Post-menopausal bleeding     Thickened endometrium     Tobacco abuse      Past Surgical History:   Procedure Laterality Date    BIOPSY MOUTH LESION N/A 2/7/2020    BIOPSY DORSAL TONGUE performed by Ellen Szymanski MD at [address] [place] [address] Dr MAXWELL  2011    COLONOSCOPY  05/07/2019    diverticula--omari    COLONOSCOPY N/A 5/7/2019    COLORECTAL CANCER SCREENING, NOT HIGH RISK performed by Brianna Sawyer MD at [place] [id number]  10/09/12    DILATION AND CURETTAGE OF UTERUS  3/12/13    with ablation  DILATION AND CURETTAGE OF UTERUS N/A 2019    DILATATION AND CURETTAGE HYSTEROSCOPY performed by Rossana Perez MD at [address] N/A 10/20/2020    DILATATION AND CURETTAGE HYSTEROSCOPY performed by Tasneem Painting MD at [address] HYSTEROSCOPY  10/09/12    LEEP  2004    OTHER SURGICAL HISTORY  2013    excision salp lesion    OH REPAIR ESOPHAGUS OPENING N/A 2020    DIRECT LARYNGOSCOPY, ESOPHAGOSCOPY performed by Catarina Wright MD at [facility]      T&A    TUBAL LIGATION       Family History   Problem Relation Age of Onset    Alcohol Abuse Father     COPD Mother     Osteoporosis Mother    Orest Frederic Arthritis Mother     Diabetes Mother     High Blood Pressure Mother     High Blood Pressure Sister     High Cholesterol Sister     Substance Abuse Sister     Alcohol Abuse Sister     Arthritis Maternal Aunt     Arthritis Maternal Uncle     Arthritis Maternal Grandmother     Cancer Maternal Grandmother     High Blood Pressure Maternal Grandmother     Arthritis Maternal Grandfather     Cancer Maternal Grandfather     Substance Abuse Paternal Grandfather      Social History     Socioeconomic History    Marital status:      Spouse name: Not on file    Number of children: Not on file    Years of education: Not on file    Highest education level: Not on file   Occupational History    Not on file   Tobacco Use    Smoking status: Current Every Day Smoker     Packs/day: 0.25     Years: 34.00     Pack years: 8.50     Last attempt to quit: 6/10/2016     Years since quittin.0    Smokeless tobacco: Never Used    Tobacco comment: cutting down   Vaping Use    Vaping Use: Never used   Substance and Sexual Activity    Alcohol use:  Yes     Alcohol/week: 0.0 standard drinks     Comment: social     Drug use: No     Types: Marijuana     Comment: in her 25s    Sexual activity: Yes     Partners: Male   Other Topics Concern    Not and no mass. There is no hepatosplenomegaly. No tenderness. Musculoskeletal: She exhibits no edema and no tenderness. Neurological: She is alert and oriented to person, place, and time. She has normal strength. No cranial nerve deficit or sensory deficit. Coordination and gait normal.   Skin: Skin is warm and dry. No rash noted. No erythema. Psychiatric: She has a normal mood and affect. Her behavior is normal.     EKG Interpretation: Past EKG reviewed. Lab Review recent labs reviewed       Assessment:       44 y.o. patient with planned surgery as above. Known risk factors for perioperative complications: COPD, Hypertension, Illicit drug use, Tobacco abuse  Current medications which may produce withdrawal symptoms if withheld perioperatively: none      Plan:     1. Preoperative workup as follows: none  2. Change in medication regimen before surgery: None  3. Prophylaxis for cardiac events with perioperative beta-blockers: Not indicated  ACC/AHA indications for pre-operative beta-blocker use:    · Vascular surgery with history of postitive stress test  · Intermediate or high risk surgery with history of CAD   · Intermediate or high risk surgery with multiple clinical predictors of CAD- 2 of the following: history of compensated or prior heart failure, history of cerebrovascular disease, DM, or renal insufficiency    Routine administration of higher-dose, long-acting metoprolol in beta-blocker-naïve patients on the day of surgery, and in the absence of dose titration is associated with an overall increase in mortality. Beta-blockers should be started days to weeks prior to surgery and titrated to pulse < 70.  4. Deep vein thrombosis prophylaxis: regimen to be chosen by surgical team  5. Discussed with patient to take her medications as prescribed, also recommended to follow-up for elevated blood pressure. Discussed smoking cessation and encouraged to quit smoking.   Discussed risk and benefits of surgery patient willing to proceed ahead. Discussed with patient she is at increased risk of possible complications because of her COPD, current smoking and elevated blood pressure. Based on risk and benefits patient decided to proceed with the surgery.

## 2021-06-15 ENCOUNTER — HOSPITAL ENCOUNTER (OUTPATIENT)
Dept: MRI IMAGING | Age: 51
Discharge: HOME | End: 2021-06-17
Payer: COMMERCIAL

## 2021-06-15 DIAGNOSIS — M20.12 ACQUIRED HALLUX VALGUS OF LEFT FOOT: ICD-10-CM

## 2021-06-15 PROCEDURE — 73721 MRI JNT OF LWR EXTRE W/O DYE: CPT

## 2021-07-20 ENCOUNTER — HOSPITAL ENCOUNTER (EMERGENCY)
Age: 51
Discharge: HOME | End: 2021-07-20
Payer: COMMERCIAL

## 2021-07-20 VITALS
TEMPERATURE: 99.1 F | DIASTOLIC BLOOD PRESSURE: 76 MMHG | BODY MASS INDEX: 22.66 KG/M2 | RESPIRATION RATE: 14 BRPM | HEART RATE: 107 BPM | HEIGHT: 61 IN | OXYGEN SATURATION: 93 % | SYSTOLIC BLOOD PRESSURE: 160 MMHG | WEIGHT: 120 LBS

## 2021-07-20 DIAGNOSIS — R21 RASH AND OTHER NONSPECIFIC SKIN ERUPTION: Primary | ICD-10-CM

## 2021-07-20 LAB
BASOPHILS ABSOLUTE: 0.03 E9/L (ref 0–0.2)
BASOPHILS RELATIVE PERCENT: 0.5 % (ref 0–2)
EOSINOPHILS ABSOLUTE: 0.12 E9/L (ref 0.05–0.5)
EOSINOPHILS RELATIVE PERCENT: 1.8 % (ref 0–6)
HCT VFR BLD CALC: 44.1 % (ref 34–48)
HEMOGLOBIN: 14 G/DL (ref 11.5–15.5)
IMMATURE GRANULOCYTES #: 0.02 E9/L
IMMATURE GRANULOCYTES %: 0.3 % (ref 0–5)
LYMPHOCYTES ABSOLUTE: 1.71 E9/L (ref 1.5–4)
LYMPHOCYTES RELATIVE PERCENT: 26.3 % (ref 20–42)
MCH RBC QN AUTO: 31 PG (ref 26–35)
MCHC RBC AUTO-ENTMCNC: 31.7 % (ref 32–34.5)
MCV RBC AUTO: 97.6 FL (ref 80–99.9)
MONOCYTES ABSOLUTE: 0.48 E9/L (ref 0.1–0.95)
MONOCYTES RELATIVE PERCENT: 7.4 % (ref 2–12)
NEUTROPHILS ABSOLUTE: 4.15 E9/L (ref 1.8–7.3)
NEUTROPHILS RELATIVE PERCENT: 63.7 % (ref 43–80)
PDW BLD-RTO: 13.1 FL (ref 11.5–15)
PLATELET # BLD: 221 E9/L (ref 130–450)
PMV BLD AUTO: 9.5 FL (ref 7–12)
RBC # BLD: 4.52 E12/L (ref 3.5–5.5)
WBC # BLD: 6.5 E9/L (ref 4.5–11.5)

## 2021-07-20 PROCEDURE — 6370000000 HC RX 637 (ALT 250 FOR IP): Performed by: NURSE PRACTITIONER

## 2021-07-20 PROCEDURE — 99284 EMERGENCY DEPT VISIT MOD MDM: CPT

## 2021-07-20 PROCEDURE — 85025 COMPLETE CBC W/AUTO DIFF WBC: CPT

## 2021-07-20 RX ORDER — DOXYCYCLINE HYCLATE 100 MG
100 TABLET ORAL 2 TIMES DAILY
Qty: 20 TABLET | Refills: 0 | Status: SHIPPED | OUTPATIENT
Start: 2021-07-20 | End: 2021-07-30

## 2021-07-20 RX ORDER — ACETAMINOPHEN 500 MG
500 TABLET ORAL EVERY 6 HOURS PRN
Qty: 20 TABLET | Refills: 0 | Status: SHIPPED | OUTPATIENT
Start: 2021-07-20

## 2021-07-20 RX ORDER — ACETAMINOPHEN 500 MG
1000 TABLET ORAL ONCE
Status: COMPLETED | OUTPATIENT
Start: 2021-07-20 | End: 2021-07-20

## 2021-07-20 RX ORDER — DOXYCYCLINE HYCLATE 100 MG/1
100 CAPSULE ORAL ONCE
Status: COMPLETED | OUTPATIENT
Start: 2021-07-20 | End: 2021-07-20

## 2021-07-20 RX ADMIN — ACETAMINOPHEN 1000 MG: 500 TABLET ORAL at 20:22

## 2021-07-20 RX ADMIN — DOXYCYCLINE HYCLATE 100 MG: 100 CAPSULE ORAL at 20:41

## 2021-07-20 ASSESSMENT — PAIN SCALES - GENERAL
PAINLEVEL_OUTOF10: 8
PAINLEVEL_OUTOF10: 4

## 2021-07-20 NOTE — ED PROVIDER NOTES
[facility]  Department of Emergency Medicine   ED  Encounter Note  Admit Date/RoomTime: 2021  6:52 PM  ED Room:     NAME: Rao Agee  : 1970  MRN: [id number]     Chief Complaint:  Rash (on abd x 3 days, slightly painful, denies pruritis )    History of Present Illness       Rao Agee is a 44 y.o. old female who presents to the emergency department by private vehicle, for gradual onset of red, raised and Hemorrhagic rash noted on her anterior thorax right thigh and left forearm that started 3 days prior to arrival.  Patient reports them being slightly painful and states she thought they were blackheads and she picks at them and denies any fever, chills, difficulty breathing or itching. She reports she does have a dog at home denies any infestations. The symptoms were caused by unknown cause. Since onset the symptoms have been persistent and gradually worsening. Prior history of similar episodes: No.   Her symptoms are associated with nothing additional and relieved by nothing. She denies any hives, welts, difficulty breathing, difficulty swallowing, wheezing, throat tightness, hoarseness, stridor, itching, lightheadedness, dizziness, facial swelling, lip swelling, tongue swelling, fever, chills, chest pain, abd pain, drainage, increased redness or increased swelling. She denies any recent hot tub use. ROS   Pertinent positives and negatives are stated within HPI, all other systems reviewed and are negative. Past Medical History:  has a past medical history of Anxiety and depression, Asthma, Chronic pelvic pain in female, COPD (chronic obstructive pulmonary disease) [address] Left hip pain, Left leg pain, Neuropathy, Osteoarthritis, Post-menopausal bleeding, Thickened endometrium, and Tobacco abuse. Surgical History:  has a past surgical history that includes bladder repair (); LEEP (2004);  Tubal ligation; Dilation and curettage of uterus (10/09/12); hysteroscopy (10/09/12); Dilation and curettage of uterus (3/12/13); other surgical history (8/9/2013); Dilation and curettage of uterus (N/A, 4/23/2019); Colonoscopy (05/07/2019); Colonoscopy (N/A, 5/7/2019); Tonsillectomy; pr repair esophagus opening (N/A, 2/7/2020); Biopsy mouth lesion (N/A, 2/7/2020); and Dilation and curettage of uterus (N/A, 10/20/2020). Social History:  reports that she has been smoking. She has a 17.00 pack-year smoking history. She has never used smokeless tobacco. She reports current alcohol use. She reports that she does not use drugs. Family History: family history includes Alcohol Abuse in her father and sister; Arthritis in her maternal aunt, maternal grandfather, maternal grandmother, maternal uncle, and mother; COPD in her mother; Cancer in her maternal grandfather and maternal grandmother; Diabetes in her mother; High Blood Pressure in her maternal grandmother, mother, and sister; High Cholesterol in her sister; Osteoporosis in her mother; Substance Abuse in her paternal grandfather and sister. Allergies: Patient has no known allergies. Physical Exam   Oxygen Saturation Interpretation: Normal.        ED Triage Vitals   BP Temp Temp Source Pulse Resp SpO2 Height Weight   07/20/21 1918 07/20/21 1917 07/20/21 1917 07/20/21 1917 07/20/21 1917 07/20/21 1917 07/20/21 1917 07/20/21 1917   (!) 160/76 99.1 °F (37.3 °C) Temporal 107 14 93 % 5' 1\" (1.549 m) 120 lb (54.4 kg)         Constitutional:  Alert, development consistent with age. HEENT:  NC/NT. Airway patent. Eyes:  PERRL, EOMI, no discharge. Ears:  TMs without perforation, injection, or bulging. External canals clear without exudate. Mouth:  Mucous membranes moist without lesions, tongue and gums normal.  Throat:  Pharynx without injection, exudate, or tonsillar hypertrophy. Airway patient. Neck:  Supple. No lymphadenopathy.   Respiratory:  Clear to auscultation and breath sounds equal.  CV:  Regular rate and rhythm. GI:  Abdomen Soft, nontender, +BS. Integument:  Skin turgor: Normal.              See photo below lesions less than 1 cm with surrounding erythema and hemorrhagic scabs. Neurological:  Orientation age-appropriate unless noted elseware. Motor functions intact. **Informed Consent**    The patient has given verbal consent to have photos taken of abdomen and right thigh and inserted into their ED Provider Note as part of their permanent medical record for purposes of documentation, treatment management and/or medical review. All Images taken on 7/20/21 of patient name: Sylvia Koch were transmitted and stored on secured [employer] located within [employer] by a registered [employer] Mobile Application Device. Lab / Imaging Results   (All laboratory and radiology results have been personally reviewed by myself)  Labs:  Results for orders placed or performed during the hospital encounter of 07/20/21   CBC Auto Differential   Result Value Ref Range    WBC 6.5 4.5 - 11.5 E9/L    RBC 4.52 3.50 - 5.50 E12/L    Hemoglobin 14.0 11.5 - 15.5 g/dL    Hematocrit 44.1 34.0 - 48.0 %    MCV 97.6 80.0 - 99.9 fL    MCH 31.0 26.0 - 35.0 pg    MCHC 31.7 (L) 32.0 - 34.5 %    RDW 13.1 11.5 - 15.0 fL    Platelets 488 912 - 800 E9/L    MPV 9.5 7.0 - 12.0 fL    Neutrophils % 63.7 43.0 - 80.0 %    Immature Granulocytes % 0.3 0.0 - 5.0 %    Lymphocytes % 26.3 20.0 - 42.0 %    Monocytes % 7.4 2.0 - 12.0 %    Eosinophils % 1.8 0.0 - 6.0 %    Basophils % 0.5 0.0 - 2.0 %    Neutrophils Absolute 4.15 1.80 - 7.30 E9/L    Immature Granulocytes # 0.02 E9/L    Lymphocytes Absolute 1.71 1.50 - 4.00 E9/L    Monocytes Absolute 0.48 0.10 - 0.95 E9/L    Eosinophils Absolute 0.12 0.05 - 0.50 E9/L    Basophils Absolute 0.03 0.00 - 0.20 E9/L       Imaging: All Radiology results interpreted by Radiologist unless otherwise noted.   No orders to display       ED Course / Medical Decision Making     Medications   acetaminophen (TYLENOL) tablet 1,000 mg (1,000 mg Oral Given 7/20/21 2022)   doxycycline hyclate (VIBRAMYCIN) capsule 100 mg (100 mg Oral Given 7/20/21 2041)        Consults:   None    Procedures:   none    MDM:   Will obtain CBC to check platelets and give doxycycline. CBC and platelets within normal limits and has no systemic symptoms is afebrile, nontoxic in appearance, hemodynamically stable. Patient advised on signs and symptoms warranting immediate return to the ED for reevaluation. She denies any new medications or lotions. Patient denies any infestations. She was advised on using it antibacterial soap to wash with and given doxycycline in ED for suspected bacterial folliculitis and will be given doxycycline for discharge and advised to avoid sun exposure and follow-up with PCP for reevaluation. She was given a referral to dermatology if symptoms do not improve. Plan of Care/Counseling:  JENNYFER Perez CNP reviewed today's visit with the patient in addition to providing specific details for the plan of care and counseling regarding the diagnosis and prognosis. Questions are answered at this time and are agreeable with the plan. Assessment      1. Rash and other nonspecific skin eruption      Plan   Discharged home. Patient condition is good    New Medications     New Prescriptions    ACETAMINOPHEN (APAP EXTRA STRENGTH) 500 MG TABLET    Take 1 tablet by mouth every 6 hours as needed for Pain    DOXYCYCLINE HYCLATE (VIBRA-TABS) 100 MG TABLET    Take 1 tablet by mouth 2 times daily for 10 days     Electronically signed by JENNYFER Perez CNP   DD: 7/20/21  **This report was transcribed using voice recognition software. Every effort was made to ensure accuracy; however, inadvertent computerized transcription errors may be present.   END OF ED PROVIDER NOTE     JENNYFER Perez CNP  07/20/21 1397

## 2021-09-13 ENCOUNTER — TELEPHONE (OUTPATIENT)
Dept: FAMILY MEDICINE CLINIC | Age: 51
End: 2021-09-13

## 2021-09-13 DIAGNOSIS — J45.909 UNCOMPLICATED ASTHMA, UNSPECIFIED ASTHMA SEVERITY, UNSPECIFIED WHETHER PERSISTENT: Primary | ICD-10-CM

## 2021-09-13 DIAGNOSIS — Z76.0 MEDICATION REFILL: ICD-10-CM

## 2021-09-13 NOTE — TELEPHONE ENCOUNTER
----- Message from Marcie Drummond sent at 9/13/2021 10:16 AM EDT -----  Subject: Message to Provider    QUESTIONS  Information for Provider? Pt states she would be able to get a nebulizer   asthma seems to be getting worse. Also, would like to know is she can get   a referral for Dr. Marty Brown [phone]  ---------------------------------------------------------------------------  --------------  Andres LARSEN  What is the best way for the office to contact you? OK to leave message on   voicemail  Preferred Call Back Phone Number? [phone]  ---------------------------------------------------------------------------  --------------  SCRIPT ANSWERS  Relationship to Patient? Self  (Is the patient requesting to be seen urgently for their symptoms?)? No  Is this follow up request related to routine Diabetes Management? No  Are you having any new concerns about your existing condition?  Yes

## 2021-09-14 ENCOUNTER — TELEPHONE (OUTPATIENT)
Dept: FAMILY MEDICINE CLINIC | Age: 51
End: 2021-09-14

## 2021-09-14 DIAGNOSIS — J44.9 CHRONIC OBSTRUCTIVE PULMONARY DISEASE, UNSPECIFIED COPD TYPE (HCC): Primary | ICD-10-CM

## 2021-09-14 RX ORDER — QUETIAPINE FUMARATE 25 MG/1
TABLET, FILM COATED ORAL
Status: ON HOLD | COMMUNITY
Start: 2021-08-10 | End: 2021-09-27 | Stop reason: HOSPADM

## 2021-09-14 RX ORDER — PAROXETINE 30 MG/1
TABLET, FILM COATED ORAL
COMMUNITY
Start: 2021-08-20

## 2021-09-14 RX ORDER — HYDROCODONE BITARTRATE AND ACETAMINOPHEN 5; 325 MG/1; MG/1
TABLET ORAL
COMMUNITY
Start: 2021-06-24 | End: 2021-11-16

## 2021-09-14 RX ORDER — TIOTROPIUM BROMIDE INHALATION SPRAY 3.12 UG/1
SPRAY, METERED RESPIRATORY (INHALATION)
Qty: 4 G | Refills: 2 | Status: SHIPPED
Start: 2021-09-14 | End: 2021-11-16 | Stop reason: SDUPTHER

## 2021-09-14 RX ORDER — ALBUTEROL SULFATE 90 UG/1
AEROSOL, METERED RESPIRATORY (INHALATION)
Qty: 18 G | Refills: 5 | Status: SHIPPED
Start: 2021-09-14 | End: 2021-10-08 | Stop reason: SDUPTHER

## 2021-09-14 NOTE — TELEPHONE ENCOUNTER
I called [phone]. Skyla West did not     Refilled Albuterol              Spiriva  Referral was placed for Pulmonology out-patient visit      I left in VM for Skyla West that 1) needs to schedule clinic appointment 2) how often is she using current inhalers 3) what exactly are her symptoms (Chest pain, chest tightness, fevers, chills, cough etc).  Also advised alarm symptoms that would promt ER visit

## 2021-09-20 ENCOUNTER — OFFICE VISIT (OUTPATIENT)
Dept: FAMILY MEDICINE CLINIC | Age: 51
End: 2021-09-20
Payer: COMMERCIAL

## 2021-09-20 VITALS
DIASTOLIC BLOOD PRESSURE: 72 MMHG | TEMPERATURE: 98.8 F | OXYGEN SATURATION: 94 % | SYSTOLIC BLOOD PRESSURE: 133 MMHG | HEIGHT: 61 IN | BODY MASS INDEX: 22.47 KG/M2 | HEART RATE: 69 BPM | WEIGHT: 119 LBS

## 2021-09-20 DIAGNOSIS — J44.9 CHRONIC OBSTRUCTIVE PULMONARY DISEASE, UNSPECIFIED COPD TYPE (HCC): Primary | ICD-10-CM

## 2021-09-20 DIAGNOSIS — Z72.0 NICOTINE USE: ICD-10-CM

## 2021-09-20 PROBLEM — F33.2 MAJOR DEPRESSIVE DISORDER, RECURRENT SEVERE WITHOUT PSYCHOTIC FEATURES (HCC): Status: RESOLVED | Noted: 2019-10-24 | Resolved: 2021-09-20

## 2021-09-20 PROCEDURE — 99212 OFFICE O/P EST SF 10 MIN: CPT | Performed by: STUDENT IN AN ORGANIZED HEALTH CARE EDUCATION/TRAINING PROGRAM

## 2021-09-20 PROCEDURE — 3023F SPIROM DOC REV: CPT | Performed by: FAMILY MEDICINE

## 2021-09-20 PROCEDURE — 99213 OFFICE O/P EST LOW 20 MIN: CPT | Performed by: STUDENT IN AN ORGANIZED HEALTH CARE EDUCATION/TRAINING PROGRAM

## 2021-09-20 PROCEDURE — 3017F COLORECTAL CA SCREEN DOC REV: CPT | Performed by: FAMILY MEDICINE

## 2021-09-20 PROCEDURE — 4004F PT TOBACCO SCREEN RCVD TLK: CPT | Performed by: FAMILY MEDICINE

## 2021-09-20 PROCEDURE — G8427 DOCREV CUR MEDS BY ELIG CLIN: HCPCS | Performed by: FAMILY MEDICINE

## 2021-09-20 PROCEDURE — G8926 SPIRO NO PERF OR DOC: HCPCS | Performed by: FAMILY MEDICINE

## 2021-09-20 PROCEDURE — G8420 CALC BMI NORM PARAMETERS: HCPCS | Performed by: FAMILY MEDICINE

## 2021-09-20 RX ORDER — ARIPIPRAZOLE 2 MG/1
2 TABLET ORAL DAILY
COMMUNITY
Start: 2021-09-17

## 2021-09-20 SDOH — ECONOMIC STABILITY: FOOD INSECURITY: WITHIN THE PAST 12 MONTHS, THE FOOD YOU BOUGHT JUST DIDN'T LAST AND YOU DIDN'T HAVE MONEY TO GET MORE.: OFTEN TRUE

## 2021-09-20 SDOH — ECONOMIC STABILITY: FOOD INSECURITY: WITHIN THE PAST 12 MONTHS, YOU WORRIED THAT YOUR FOOD WOULD RUN OUT BEFORE YOU GOT MONEY TO BUY MORE.: OFTEN TRUE

## 2021-09-20 ASSESSMENT — LIFESTYLE VARIABLES
HOW OFTEN DO YOU HAVE A DRINK CONTAINING ALCOHOL: 2-4 TIMES A MONTH
HOW MANY STANDARD DRINKS CONTAINING ALCOHOL DO YOU HAVE ON A TYPICAL DAY: 1 OR 2

## 2021-09-20 ASSESSMENT — SOCIAL DETERMINANTS OF HEALTH (SDOH): HOW HARD IS IT FOR YOU TO PAY FOR THE VERY BASICS LIKE FOOD, HOUSING, MEDICAL CARE, AND HEATING?: NOT HARD AT ALL

## 2021-09-20 NOTE — PROGRESS NOTES
CC: COPD/nicotine use  ------------------------------------------------------------------------------------------------------------------------  Assessment/Plan  1. Chronic obstructive pulmonary disease, unspecified COPD type ([address]  Advised patient to increase Spiriva to twice daily  Continue with albuterol, will order nebulizer  Continue with nicotine cessation  - Seda Azevedo MD, Pulmonary, [address]    2. Nicotine use  Now on 2 to 3 cigarettes a day the most, improving  - Internal Referral To Smoking Cessation Program ([facility])    Health maintenance-standing order for mammogram is present. Advised patient to call and schedule    RTO 2 months for follow-up of nicotine cessation  -------------------------------------------------------------------------------------------------------------------------    HPI:  44 y. o.woman presents for:    COPD  Last PFT 2019- copd moderate  Dr. Micheal Berrios office and called and said cannot see patient anyore-unless she can come to appointments  Has no showed for pulmonology appointments in the past  She said her depression was a barrier to keeping track of appointments  Today she states is in a better mental state, wants to schedule with pulmonology  Today she states, she has no barriers to transportation, and is fully motivated to be compliant with her appointments      \"COPD is getting worse\" last 4-5  Albuterol up to 6x a day  Spiriva 1x daily only    Requests nebulizer for albuterol- 'it works better'    +sob with going up and down stairs  +sob if humid outside; has to stay in [place].   Dry cough 2-3x a week very mild  No wheezing, chest tightness, leg swelling, no waking up in middle night gasping for air    No echo on file    Nicotine use  Cut down on smoking '2-3 a day'  'cannot seem to get clean'  Motivation is to cut down to 'breathe better'  Not using the patches, nicotine gum    Interested in smoking cessation hotline, not interested in patches- gives her bad dreams    Talked to counselor about trying to quit smoking, is calling whenever you get a pebblesb    Patient Active Problem List    Diagnosis Date Noted    Moderate asthma without complication 61/54/6608    Endometrial thickening on ultrasound 09/22/2020    Major depressive disorder, recurrent severe without psychotic features ([address] 10/24/2019    At risk for colon cancer     PMB (postmenopausal bleeding) 04/16/2019    Thrombocytopenia ([address] 12/14/2017    Polysubstance abuse ([address] 06/17/2016    ANAHY (acute kidney injury) ([address] 06/17/2016    Anxiety disorder 11/18/2014    RSD (reflex sympathetic dystrophy) 09/16/2014    Chronic pain syndrome 09/16/2014    Leg pain 07/22/2013    Scalp cyst 07/22/2013    Pelvic pain in female 06/12/2012    Incontinence of urine 11/04/2010    COPD, severe ([address] 10/22/2010    Leg pain, left 10/22/2010    Tobacco abuse 10/22/2010       Past Medical History:   Diagnosis Date    Anxiety and depression     Asthma     controlled     Chronic pelvic pain in female     COPD (chronic obstructive pulmonary disease) ([address]     controlled with inhalers     Left hip pain     8/10 severity    Left leg pain     8/10 severity    Neuropathy     Osteoarthritis     Post-menopausal bleeding     Thickened endometrium     Tobacco abuse        Current Outpatient Medications on File Prior to Visit   Medication Sig Dispense Refill    ARIPiprazole (ABILIFY) 2 MG tablet Take 2 mg by mouth daily      PARoxetine (PAXIL) 30 MG tablet take 2 tablets by mouth once daily      SPIRIVA RESPIMAT 2.5 MCG/ACT AERS inhaler Take daily 4 g 2    albuterol sulfate  (90 Base) MCG/ACT inhaler inhale 2 puffs by mouth every 6 hours if needed 18 g 5    acetaminophen (APAP EXTRA STRENGTH) 500 MG tablet Take 1 tablet by mouth every 6 hours as needed for Pain 20 tablet 0    ibuprofen (ADVIL;MOTRIN) 600 MG tablet take 1 tablet by mouth three times a day if needed for pain 90 tablet 1    Cholecalciferol (VITAMIN D3) 125 MCG (5000 UT) TABS Take by mouth daily      Ascorbic Acid (VITAMIN C) 250 MG tablet Take 250 mg by mouth daily      cloNIDine (CATAPRES) 0.2 MG tablet take 1 tablet by mouth at bedtime  0    diazepam (VALIUM) 10 MG tablet Take 10 mg by mouth 3 times daily as needed. 0    HYDROcodone-acetaminophen (NORCO) 5-325 MG per tablet  (Patient not taking: Reported on 9/20/2021)      QUEtiapine (SEROQUEL) 25 MG tablet take 1 tablet by mouth at bedtime if needed (Patient not taking: Reported on 9/20/2021)      QUEtiapine (SEROQUEL) 100 MG tablet take 1/2 tablet by mouth if needed for sleep (Patient not taking: Reported on 6/11/2021) 60 tablet 0    tiZANidine (ZANAFLEX) 2 MG tablet Take 1 tablet by mouth 2 times daily as needed (neck tightness) (Patient not taking: Reported on 6/11/2021) 20 tablet 0    fluticasone (FLONASE) 50 MCG/ACT nasal spray 2 sprays by Nasal route nightly 1 Bottle 2    PARoxetine (PAXIL) 10 MG tablet Take 10 mg by mouth every morning (Patient not taking: Reported on 9/20/2021)      nicotine (NICODERM CQ) 21 MG/24HR Place 1 patch onto the skin daily (Patient not taking: Reported on 11/5/2020) 30 patch 1     No current facility-administered medications on file prior to visit.        No Known Allergies    Family History   Problem Relation Age of Onset    Alcohol Abuse Father     COPD Mother     Osteoporosis Mother     Arthritis Mother     Diabetes Mother     High Blood Pressure Mother     High Blood Pressure Sister     High Cholesterol Sister     Substance Abuse Sister     Alcohol Abuse Sister     Arthritis Maternal Aunt     Arthritis Maternal Uncle     Arthritis Maternal Grandmother     Cancer Maternal Grandmother     High Blood Pressure Maternal Grandmother     Arthritis Maternal Grandfather     Cancer Maternal Grandfather     Substance Abuse Paternal Grandfather        Past Surgical History:   Procedure Laterality Date    BIOPSY MOUTH LESION N/A 2020    BIOPSY DORSAL TONGUE performed by Jaxson Oglesby MD at [address] [place] [address] Dr S      COLONOSCOPY  2019    diverticula--omari    COLONOSCOPY N/A 2019    COLORECTAL CANCER SCREENING, NOT HIGH RISK performed by [facility]beca Horan [facility] at [facility]  10/09/12    DILATION AND CURETTAGE OF UTERUS  3/12/13    with ablation    DILATION AND CURETTAGE OF UTERUS N/A 2019    DILATATION AND CURETTAGE HYSTEROSCOPY performed by Amanda Quinteros MD at [address] [place] [address] N N/A 10/20/2020    DILAT[facility]    LEEP  2004    OTHER SURGICAL HISTORY  2013    excision salp lesion    [place] REPAIR ESOPHAGUS OPENING N/A 2020    DIRECT LARYNGOSCOPY, ESOPHAGOSCOPY performed by Jaxson Oglesby MD at [place] [address]      T&A    TUBAL LIGATION         Social History     Tobacco Use    Smoking status: Current Every Day Smoker     Packs/day: 0.50     Years: 34.00     Pack years: 17.00     Last attempt to quit: 6/10/2016     Years since quittin.2    Smokeless tobacco: Never Used    Tobacco comment: cutting down   Vaping Use    Vaping Use: Never used   Substance Use Topics    Alcohol use: Yes     Alcohol/week: 0.0 standard drinks     Comment: social     Drug use: No     Types: Marijuana     Comment: in her 20s       ROS:    Review of Systems   Constitutional: Negative for activity change, appetite change, chills and fatigue. HENT: Negative for congestion and sore throat. Respiratory: Shortness of breath, dry cough. Negative for choking and chest tightness  Cardiovascular: Negative for chest pain, palpitations and leg swelling. Gastrointestinal: Negative for abdominal distention and abdominal pain. Genitourinary: Negative for difficulty urinating and dysuria.    Musculoskeletal: Negative for medication(s) also explained. Patient and/or caregiver was instructed to call if any new symptoms develop prior to next visit. Health risk factors discussed and addressed.

## 2021-09-20 NOTE — PROGRESS NOTES
S: 44 y.o. female presents today for Referral - General and Other (Nebulizer)    COPD f/u: last PFt 2019; depression barrier to attending apt in the past with pulm and would like to re-establish with him; sob with climbing 1 flight of stairs started3 months ago; also with increased humidity; otherwise normal baseline symptoms; uses albuterol PRN 6x per day and asking for nebulizer; cut back to 2-3 cigs per day      O: VS: /72 (Site: Left Upper Arm, Position: Sitting, Cuff Size: Medium Adult)   Pulse 69   Temp 98.8 °F (37.1 °C) (Temporal)   Ht 5' 1\" (1.549 m)   Wt 119 lb (54 kg)   LMP 12/14/2017   SpO2 94%   BMI 22.48 kg/m²   AAO/NAD, appropriate affect for mood  CV:  RRR, no murmur  Resp: CTAB  Abdomen: sntND  Ext: no edema    Assessment/Plan:   1) COPD - spiriva BID from daily; nebulizer; f/u with pulm  2) Tobacco use - tob cessation program  3) HM - as ordered  RTO: 2-3 months    Attending Physician Statement  I have discussed the case, including pertinent history and exam findings with the resident. I agree with the documented assessment and plan.       Electronically signed by Jonelle Nova MD on 9/20/2021 at 2:33 PM

## 2021-09-20 NOTE — TELEPHONE ENCOUNTER
Patient came in today    She said will make the next [place] appointment. Set a goal to overcome her barrier of missing prior appointments; was having mental health issues is now feeling better.     Please asist with re-scheduling with Dr. Christianne Jackson's office; fresh referral has been placed thank you

## 2021-09-22 ENCOUNTER — TELEPHONE (OUTPATIENT)
Dept: FAMILY MEDICINE CLINIC | Age: 51
End: 2021-09-22

## 2021-09-22 NOTE — TELEPHONE ENCOUNTER
----- Message from Terrall Rinne sent at 9/22/2021  9:56 AM EDT -----  Subject: Message to Provider    QUESTIONS  Information for Provider? patient call concerning nebulizer ordered on   9/20/2021, patient has medication but has not received the nebulizer and   wants to know how they would receive it  ---------------------------------------------------------------------------  --------------  CALL BACK INFO  What is the best way for the office to contact you? OK to leave message on   voicemail  Preferred Call Back Phone Number? [phone]  ---------------------------------------------------------------------------  --------------  SCRIPT ANSWERS  Relationship to Patient?  Self

## 2021-09-26 ENCOUNTER — HOSPITAL ENCOUNTER (INPATIENT)
Age: 51
LOS: 1 days | Discharge: HOME | DRG: 816 | End: 2021-09-27
Attending: INTERNAL MEDICINE | Admitting: INTERNAL MEDICINE
Payer: COMMERCIAL

## 2021-09-26 ENCOUNTER — APPOINTMENT (OUTPATIENT)
Dept: GENERAL RADIOLOGY | Age: 51
End: 2021-09-26
Payer: COMMERCIAL

## 2021-09-26 ENCOUNTER — APPOINTMENT (OUTPATIENT)
Dept: CT IMAGING | Age: 51
End: 2021-09-26
Payer: COMMERCIAL

## 2021-09-26 ENCOUNTER — HOSPITAL ENCOUNTER (EMERGENCY)
Age: 51
Discharge: ANOTHER HOSPITAL | End: 2021-09-26
Attending: EMERGENCY MEDICINE
Payer: COMMERCIAL

## 2021-09-26 VITALS
SYSTOLIC BLOOD PRESSURE: 118 MMHG | BODY MASS INDEX: 22.66 KG/M2 | RESPIRATION RATE: 16 BRPM | TEMPERATURE: 98.6 F | DIASTOLIC BLOOD PRESSURE: 72 MMHG | WEIGHT: 120 LBS | HEART RATE: 72 BPM | OXYGEN SATURATION: 96 % | HEIGHT: 61 IN

## 2021-09-26 DIAGNOSIS — J44.1 COPD WITH ACUTE EXACERBATION (HCC): ICD-10-CM

## 2021-09-26 DIAGNOSIS — T40.1X1A ACCIDENTAL OVERDOSE OF HEROIN, INITIAL ENCOUNTER (HCC): Primary | ICD-10-CM

## 2021-09-26 DIAGNOSIS — R91.1 LUNG NODULE: ICD-10-CM

## 2021-09-26 DIAGNOSIS — F19.10 POLYSUBSTANCE ABUSE (HCC): ICD-10-CM

## 2021-09-26 DIAGNOSIS — R09.02 HYPOXIA: ICD-10-CM

## 2021-09-26 PROBLEM — J96.92 HYPERCAPNIC RESPIRATORY FAILURE (HCC): Status: ACTIVE | Noted: 2021-09-26

## 2021-09-26 LAB
ALBUMIN SERPL-MCNC: 4.5 G/DL (ref 3.5–5.2)
ALP BLD-CCNC: 77 U/L (ref 35–104)
ALT SERPL-CCNC: 8 U/L (ref 0–32)
ANION GAP SERPL CALCULATED.3IONS-SCNC: 10 MMOL/L (ref 7–16)
AST SERPL-CCNC: 16 U/L (ref 0–31)
BASOPHILS ABSOLUTE: 0.03 E9/L (ref 0–0.2)
BASOPHILS RELATIVE PERCENT: 0.3 % (ref 0–2)
BILIRUB SERPL-MCNC: 0.4 MG/DL (ref 0–1.2)
BUN BLDV-MCNC: 10 MG/DL (ref 6–20)
CALCIUM SERPL-MCNC: 9.8 MG/DL (ref 8.6–10.2)
CHLORIDE BLD-SCNC: 100 MMOL/L (ref 98–107)
CO2: 28 MMOL/L (ref 22–29)
CREAT SERPL-MCNC: 0.9 MG/DL (ref 0.5–1)
CRITICAL NOTIFICATION: YES
D DIMER: <200 NG/ML DDU
EKG ATRIAL RATE: 85 BPM
EKG P AXIS: 79 DEGREES
EKG P-R INTERVAL: 144 MS
EKG Q-T INTERVAL: 384 MS
EKG QRS DURATION: 78 MS
EKG QTC CALCULATION (BAZETT): 456 MS
EKG R AXIS: 67 DEGREES
EKG T AXIS: 53 DEGREES
EKG VENTRICULAR RATE: 85 BPM
EOSINOPHILS ABSOLUTE: 0.02 E9/L (ref 0.05–0.5)
EOSINOPHILS RELATIVE PERCENT: 0.2 % (ref 0–6)
GFR AFRICAN AMERICAN: >60
GFR NON-AFRICAN AMERICAN: >60 ML/MIN/1.73
GLUCOSE BLD-MCNC: 97 MG/DL (ref 74–99)
HCT VFR BLD CALC: 42.7 % (ref 34–48)
HEMOGLOBIN: 14.1 G/DL (ref 11.5–15.5)
IMMATURE GRANULOCYTES #: 0.05 E9/L
IMMATURE GRANULOCYTES %: 0.5 % (ref 0–5)
LYMPHOCYTES ABSOLUTE: 0.79 E9/L (ref 1.5–4)
LYMPHOCYTES RELATIVE PERCENT: 7.9 % (ref 20–42)
MCH RBC QN AUTO: 31.8 PG (ref 26–35)
MCHC RBC AUTO-ENTMCNC: 33 % (ref 32–34.5)
MCV RBC AUTO: 96.2 FL (ref 80–99.9)
MONOCYTES ABSOLUTE: 0.44 E9/L (ref 0.1–0.95)
MONOCYTES RELATIVE PERCENT: 4.4 % (ref 2–12)
NEUTROPHILS ABSOLUTE: 8.68 E9/L (ref 1.8–7.3)
NEUTROPHILS RELATIVE PERCENT: 86.7 % (ref 43–80)
PDW BLD-RTO: 12.7 FL (ref 11.5–15)
PLATELET # BLD: 264 E9/L (ref 130–450)
PMV BLD AUTO: 9.7 FL (ref 7–12)
POC BASE EXCESS: 3.3 MMOL/L (ref -3–3)
POC CPB: NO
POC DEVICE ID: ABNORMAL
POC HCO3: 32.7 MMOL/L (ref 22–26)
POC O2 SATURATION: 63.3 % (ref 92–98.5)
POC OPERATOR ID: ABNORMAL
POC PCO2: 70.5 MMHG (ref 35–45)
POC PH: 7.27 (ref 7.35–7.45)
POC PO2: 38.6 MMHG (ref 80–100)
POC SAMPLE TYPE: ABNORMAL
POTASSIUM REFLEX MAGNESIUM: 4.3 MMOL/L (ref 3.5–5)
PRO-BNP: 199 PG/ML (ref 0–125)
RBC # BLD: 4.44 E12/L (ref 3.5–5.5)
SARS-COV-2, NAAT: NOT DETECTED
SODIUM BLD-SCNC: 138 MMOL/L (ref 132–146)
TOTAL PROTEIN: 7 G/DL (ref 6.4–8.3)
TROPONIN, HIGH SENSITIVITY: 13 NG/L (ref 0–9)
TROPONIN, HIGH SENSITIVITY: 13 NG/L (ref 0–9)
WBC # BLD: 10 E9/L (ref 4.5–11.5)

## 2021-09-26 PROCEDURE — 87635 SARS-COV-2 COVID-19 AMP PRB: CPT

## 2021-09-26 PROCEDURE — 84484 ASSAY OF TROPONIN QUANT: CPT

## 2021-09-26 PROCEDURE — 6360000002 HC RX W HCPCS: Performed by: INTERNAL MEDICINE

## 2021-09-26 PROCEDURE — 6360000002 HC RX W HCPCS: Performed by: EMERGENCY MEDICINE

## 2021-09-26 PROCEDURE — 83880 ASSAY OF NATRIURETIC PEPTIDE: CPT

## 2021-09-26 PROCEDURE — 96374 THER/PROPH/DIAG INJ IV PUSH: CPT

## 2021-09-26 PROCEDURE — 93010 ELECTROCARDIOGRAM REPORT: CPT | Performed by: INTERNAL MEDICINE

## 2021-09-26 PROCEDURE — 82803 BLOOD GASES ANY COMBINATION: CPT

## 2021-09-26 PROCEDURE — 1200000000 HC SEMI PRIVATE

## 2021-09-26 PROCEDURE — 85025 COMPLETE CBC W/AUTO DIFF WBC: CPT

## 2021-09-26 PROCEDURE — 36415 COLL VENOUS BLD VENIPUNCTURE: CPT

## 2021-09-26 PROCEDURE — 6370000000 HC RX 637 (ALT 250 FOR IP): Performed by: INTERNAL MEDICINE

## 2021-09-26 PROCEDURE — 2580000003 HC RX 258: Performed by: INTERNAL MEDICINE

## 2021-09-26 PROCEDURE — 80053 COMPREHEN METABOLIC PANEL: CPT

## 2021-09-26 PROCEDURE — 99223 1ST HOSP IP/OBS HIGH 75: CPT | Performed by: INTERNAL MEDICINE

## 2021-09-26 PROCEDURE — 2700000000 HC OXYGEN THERAPY PER DAY

## 2021-09-26 PROCEDURE — 85378 FIBRIN DEGRADE SEMIQUANT: CPT

## 2021-09-26 PROCEDURE — 99285 EMERGENCY DEPT VISIT HI MDM: CPT

## 2021-09-26 PROCEDURE — 71045 X-RAY EXAM CHEST 1 VIEW: CPT

## 2021-09-26 PROCEDURE — 6370000000 HC RX 637 (ALT 250 FOR IP)

## 2021-09-26 PROCEDURE — 6370000000 HC RX 637 (ALT 250 FOR IP): Performed by: EMERGENCY MEDICINE

## 2021-09-26 PROCEDURE — 71250 CT THORAX DX C-: CPT

## 2021-09-26 PROCEDURE — 93005 ELECTROCARDIOGRAM TRACING: CPT | Performed by: EMERGENCY MEDICINE

## 2021-09-26 RX ORDER — PREDNISONE 20 MG/1
40 TABLET ORAL DAILY
Status: DISCONTINUED | OUTPATIENT
Start: 2021-09-29 | End: 2021-09-27 | Stop reason: HOSPADM

## 2021-09-26 RX ORDER — ACETAMINOPHEN 325 MG/1
650 TABLET ORAL EVERY 6 HOURS PRN
Status: DISCONTINUED | OUTPATIENT
Start: 2021-09-26 | End: 2021-09-27 | Stop reason: HOSPADM

## 2021-09-26 RX ORDER — IPRATROPIUM BROMIDE AND ALBUTEROL SULFATE 2.5; .5 MG/3ML; MG/3ML
1 SOLUTION RESPIRATORY (INHALATION)
Status: DISCONTINUED | OUTPATIENT
Start: 2021-09-26 | End: 2021-09-27 | Stop reason: HOSPADM

## 2021-09-26 RX ORDER — IPRATROPIUM BROMIDE AND ALBUTEROL SULFATE 2.5; .5 MG/3ML; MG/3ML
1 SOLUTION RESPIRATORY (INHALATION)
Status: DISPENSED | OUTPATIENT
Start: 2021-09-26 | End: 2021-09-26

## 2021-09-26 RX ORDER — SODIUM CHLORIDE 0.9 % (FLUSH) 0.9 %
5-40 SYRINGE (ML) INJECTION PRN
Status: DISCONTINUED | OUTPATIENT
Start: 2021-09-26 | End: 2021-09-27 | Stop reason: HOSPADM

## 2021-09-26 RX ORDER — NALOXONE HYDROCHLORIDE 4 MG/.1ML
1 SPRAY NASAL PRN
Status: DISCONTINUED | OUTPATIENT
Start: 2021-09-26 | End: 2021-09-26 | Stop reason: HOSPADM

## 2021-09-26 RX ORDER — SODIUM CHLORIDE 9 MG/ML
25 INJECTION, SOLUTION INTRAVENOUS PRN
Status: DISCONTINUED | OUTPATIENT
Start: 2021-09-26 | End: 2021-09-27 | Stop reason: HOSPADM

## 2021-09-26 RX ORDER — ONDANSETRON 4 MG/1
4 TABLET, ORALLY DISINTEGRATING ORAL ONCE
Status: COMPLETED | OUTPATIENT
Start: 2021-09-26 | End: 2021-09-26

## 2021-09-26 RX ORDER — SODIUM CHLORIDE 0.9 % (FLUSH) 0.9 %
5-40 SYRINGE (ML) INJECTION EVERY 12 HOURS SCHEDULED
Status: DISCONTINUED | OUTPATIENT
Start: 2021-09-26 | End: 2021-09-27 | Stop reason: HOSPADM

## 2021-09-26 RX ORDER — ONDANSETRON 2 MG/ML
4 INJECTION INTRAMUSCULAR; INTRAVENOUS EVERY 6 HOURS PRN
Status: DISCONTINUED | OUTPATIENT
Start: 2021-09-26 | End: 2021-09-27 | Stop reason: HOSPADM

## 2021-09-26 RX ORDER — ARIPIPRAZOLE 2 MG/1
2 TABLET ORAL DAILY
Status: DISCONTINUED | OUTPATIENT
Start: 2021-09-26 | End: 2021-09-27 | Stop reason: HOSPADM

## 2021-09-26 RX ORDER — PAROXETINE HYDROCHLORIDE 20 MG/1
60 TABLET, FILM COATED ORAL DAILY
Status: DISCONTINUED | OUTPATIENT
Start: 2021-09-26 | End: 2021-09-27 | Stop reason: HOSPADM

## 2021-09-26 RX ORDER — METHYLPREDNISOLONE SODIUM SUCCINATE 40 MG/ML
40 INJECTION, POWDER, LYOPHILIZED, FOR SOLUTION INTRAMUSCULAR; INTRAVENOUS EVERY 6 HOURS
Status: DISCONTINUED | OUTPATIENT
Start: 2021-09-26 | End: 2021-09-27 | Stop reason: HOSPADM

## 2021-09-26 RX ORDER — ACETAMINOPHEN 650 MG/1
650 SUPPOSITORY RECTAL EVERY 6 HOURS PRN
Status: DISCONTINUED | OUTPATIENT
Start: 2021-09-26 | End: 2021-09-27 | Stop reason: HOSPADM

## 2021-09-26 RX ORDER — ONDANSETRON 4 MG/1
TABLET, ORALLY DISINTEGRATING ORAL
Status: COMPLETED
Start: 2021-09-26 | End: 2021-09-26

## 2021-09-26 RX ORDER — POLYETHYLENE GLYCOL 3350 17 G/17G
17 POWDER, FOR SOLUTION ORAL DAILY PRN
Status: DISCONTINUED | OUTPATIENT
Start: 2021-09-26 | End: 2021-09-27 | Stop reason: HOSPADM

## 2021-09-26 RX ORDER — ONDANSETRON 4 MG/1
4 TABLET, ORALLY DISINTEGRATING ORAL EVERY 8 HOURS PRN
Status: DISCONTINUED | OUTPATIENT
Start: 2021-09-26 | End: 2021-09-27 | Stop reason: HOSPADM

## 2021-09-26 RX ORDER — DEXAMETHASONE SODIUM PHOSPHATE 10 MG/ML
10 INJECTION, SOLUTION INTRAMUSCULAR; INTRAVENOUS ONCE
Status: COMPLETED | OUTPATIENT
Start: 2021-09-26 | End: 2021-09-26

## 2021-09-26 RX ORDER — ALBUTEROL SULFATE 2.5 MG/3ML
2.5 SOLUTION RESPIRATORY (INHALATION)
Status: DISCONTINUED | OUTPATIENT
Start: 2021-09-26 | End: 2021-09-27 | Stop reason: HOSPADM

## 2021-09-26 RX ADMIN — ENOXAPARIN SODIUM 40 MG: 40 INJECTION SUBCUTANEOUS at 21:48

## 2021-09-26 RX ADMIN — METHYLPREDNISOLONE SODIUM SUCCINATE 40 MG: 40 INJECTION, POWDER, LYOPHILIZED, FOR SOLUTION INTRAMUSCULAR; INTRAVENOUS at 21:48

## 2021-09-26 RX ADMIN — IPRATROPIUM BROMIDE AND ALBUTEROL SULFATE 1 AMPULE: .5; 2.5 SOLUTION RESPIRATORY (INHALATION) at 05:31

## 2021-09-26 RX ADMIN — ARIPIPRAZOLE 2 MG: 2 TABLET ORAL at 21:29

## 2021-09-26 RX ADMIN — PAROXETINE HYDROCHLORIDE 60 MG: 20 TABLET, FILM COATED ORAL at 21:29

## 2021-09-26 RX ADMIN — SODIUM CHLORIDE, PRESERVATIVE FREE 10 ML: 5 INJECTION INTRAVENOUS at 21:30

## 2021-09-26 RX ADMIN — IPRATROPIUM BROMIDE AND ALBUTEROL SULFATE 1 AMPULE: .5; 3 SOLUTION RESPIRATORY (INHALATION) at 20:36

## 2021-09-26 RX ADMIN — ONDANSETRON 4 MG: 4 TABLET, ORALLY DISINTEGRATING ORAL at 03:38

## 2021-09-26 RX ADMIN — DEXAMETHASONE SODIUM PHOSPHATE 10 MG: 10 INJECTION, SOLUTION INTRAMUSCULAR; INTRAVENOUS at 07:18

## 2021-09-26 ASSESSMENT — PAIN SCALES - GENERAL: PAINLEVEL_OUTOF10: 0

## 2021-09-26 NOTE — ED NOTES
Pt alert and appears drowsy, denies any pain or discomfort, bed at lowest level and call light within reach, no clinical s/s of any distress     Mony Johnston RN  09/26/21 [id number]

## 2021-09-26 NOTE — ED NOTES
Patients oxygen level drop to 67% when she removed NC. Currently on 4L [place].  Physician notified     Aria Ramires RN  09/26/21 [id number]

## 2021-09-26 NOTE — ED NOTES
Upon return from restroom, Blue's test perfomed to b/l wrists. (+) each side. Unsuccessful attempt made to right radial artery. ABG's drawn from left radial artery on second attempt. Pressure applied to draw sites. Tolerated well. Oxygen reapplied as soon as draw successful per Dr. Yennifer Paul instructions for order.      Jori Ryan RN  09/26/21 [facility]  09/26/21 [id number]

## 2021-09-26 NOTE — ED NOTES
Dr. Donya Arriola paged to obtain orders. States cannot give orders until sees the patient.       Jori Ryan RN  09/26/21 [id number]

## 2021-09-26 NOTE — H&P
[facility] History and Physical      CHIEF COMPLAINT: Loss of consciousness    History of Present Illness: 57-year-old female with history of COPD and tobacco abuse, anxiety and depression. Reports she uses drugs sporadically approximately once a month. Yesterday she smoked cocaine around 11 PM and a few hours later started heroin. Denies any history of IV drug abuse. First time she uses both in 1 day. She lost consciousness after the heroin. The next thing She remembers the ambulance was next to her. Reportedly she was given Narcan intranasally by her friend. She had some dry heaves but otherwise asymptomatic. Did not see any vomitus around her or on her clothes. Brought to the ED. Found to have hypoxia requiring 3 to 4 L nasal cannula. Noted to have wheezing on exam.  However she denies any significant symptoms. No wheezing. No change in her baseline cough. No URTI symptoms recently. D-dimer was negative, CT did not show pulmonary edema or evidence of aspiration.       REVIEW OF SYSTEMS:  A comprehensive 14 point review of systems was negative except for: what is in the HPI    PMH:  Past Medical History:   Diagnosis Date    Anxiety and depression     Asthma     controlled     Chronic pelvic pain in female     COPD (chronic obstructive pulmonary disease) (HCC)     controlled with inhalers     Left hip pain     8/10 severity    Left leg pain     8/10 severity    Neuropathy     Osteoarthritis     Post-menopausal bleeding     Thickened endometrium     Tobacco abuse        Surgical History:  Past Surgical History:   Procedure Laterality Date    BIOPSY MOUTH LESION N/A 2/7/2020    BIOPSY DORSAL TONGUE performed by Nilda Jensen MD at [address] [place] [address] Dr MAXWELL  2011    COLONOSCOPY  05/07/2019    diverticula--omari    COLONOSCOPY N/A 5/7/2019    COLORECTAL CANCER SCREENING, NOT HIGH RISK performed by Liliana Burdick MD at [address] [place] [address] CURETTAGE OF UTERUS  10/09/12    DILATION AND CURETTAGE OF UTERUS  3/12/13    with ablation    DILATION AND CURETTAGE OF UTERUS N/A 4/23/2019    DILATATION AND CURETTAGE HYSTEROSCOPY performed by Shannon Mccallum MD at [address] [place] [address] N/A 10/20/2020    DILATATION AND CURETTAGE HYSTEROSCOPY performed [facility] Kylie Almeida MD at [facility] HYSTEROSCOPY  10/09/12    LEEP  2004    OTHER SURGICAL HISTORY  8/9/2013    excision salp lesion    [place] REPAIR ESOPHAGUS OPENING N/A 2/7/2020    DIRECT LARYNGOSCOPY, ESOPHAGOSCOPY performed by Leandro Mireles MD at [place] [address]      T&A    TUBAL LIGATION         Medications Prior to Admission:    Prior to Admission medications    Medication Sig Start Date End Date Taking?  Authorizing Provider   albuterol (PROVENTIL) (5 MG/ML) 0.5% nebulizer solution Take 1 mL by nebulization 4 times daily as needed for Wheezing 9/20/21  Yes Emelia Harkins MD   PARoxetine (PAXIL) 30 MG tablet take 2 tablets by mouth once daily 8/20/21  Yes Historical Provider, MD Salcedo Nickels 2.5 MCG/ACT AERS inhaler Take daily 9/14/21  Yes Emelia Harkins MD   albuterol sulfate  (90 Base) MCG/ACT inhaler inhale 2 puffs by mouth every 6 hours if needed 9/14/21 9/14/22 Yes Emeila Harkins MD   acetaminophen (APAP EXTRA STRENGTH) 500 MG tablet Take 1 tablet by mouth every 6 hours as needed for Pain 7/20/21  Yes JENNYFER Rebolledo - CNP   ibuprofen (ADVIL;MOTRIN) 600 MG tablet take 1 tablet by mouth three times a day if needed for pain 10/20/20  Yes Shannon Mccallum MD   PARoxetine (PAXIL) 10 MG tablet Take 10 mg by mouth every morning    Yes Historical Provider, MD   Cholecalciferol (VITAMIN D3) 125 MCG (5000 UT) TABS Take by mouth daily   Yes Historical Provider, MD   Ascorbic Acid (VITAMIN C) 250 MG tablet Take 250 mg by mouth daily   Yes Historical Provider, MD   cloNIDine (CATAPRES) 0.2 MG tablet take 1 tablet by mouth at bedtime 11/5/19 Yes Historical Provider, MD   diazepam (VALIUM) 10 MG tablet Take 10 mg by mouth 3 times daily as needed. 11/14/19  Yes Historical Provider, MD   ARIPiprazole (ABILIFY) 2 MG tablet Take 2 mg by mouth daily 9/17/21   Historical Provider, MD   HYDROcodone-acetaminophen ([facility]) 5-325 MG per tablet  6/24/21   Historical Provider, MD   QUEtiapine (SEROQUEL) 25 MG tablet take 1 tablet by mouth at bedtime if needed  Patient not taking: Reported on 9/20/2021 8/10/21   Historical Provider, MD   QUEtiapine (SEROQUEL) 100 MG tablet take 1/2 tablet by mouth if needed for sleep  Patient not taking: Reported on 6/11/2021 1/26/21   Sharda Wilder MD   tiZANidine (ZANAFLEX) 2 MG tablet Take 1 tablet by mouth 2 times daily as needed (neck tightness)  Patient not taking: Reported on 6/11/2021 12/16/20   Sharda Wilder MD   nicotine (NICODERM CQ) 21 MG/24HR Place 1 patch onto the skin daily  Patient not taking: Reported on 11/5/2020 1/21/20   Sharda Wilder MD       Allergies:    Patient has no known allergies. Social History:    reports that she has been smoking. She has a 17.00 pack-year smoking history. She has never used smokeless tobacco. She reports current alcohol use. She reports current drug use. Drugs: Marijuana and Opiates . Family History:   family history includes Alcohol Abuse in her father and sister; Arthritis in her maternal aunt, maternal grandfather, maternal grandmother, maternal uncle, and mother; COPD in her mother; Cancer in her maternal grandfather and maternal grandmother; Diabetes in her mother; High Blood Pressure in her maternal grandmother, mother, and sister; High Cholesterol in her sister; Osteoporosis in her mother; Substance Abuse in her paternal grandfather and sister.        PHYSICAL EXAM:  Vitals:  /72   Pulse 76   Temp 98.2 °F (36.8 °C) (Oral)   Resp 16   Ht 5' 1\" (1.549 m)   Wt 120 lb (54.4 kg)   LMP 12/14/2017   SpO2 96%   BMI 22.67 kg/m²   General Appearance: alert and oriented to person, place and time and in no acute distress  Skin: warm and dry, turgor not diminished  Head: normocephalic and atraumatic  Eyes: pupils equal, round, and reactive to light, extraocular eye movements intact, conjunctivae normal  Neck: neck supple and non tender without mass   Pulmonary/Chest: clear to auscultation bilaterally-diffuse expiratory wheezing  Cardiovascular: normal rate, normal S1 and S2 and no M/R/R  Abdomen: soft, non-tender, non-distended, normal bowel sounds, no masses or organomegaly  Extremities: no cyanosis, no clubbing and no edema  Neurologic: no cranial nerve deficit and speech normal      LABS:  Recent Labs     09/26/21  0612      K 4.3      CO2 28   BUN 10   CREATININE 0.9   GLUCOSE 97   CALCIUM 9.8       Recent Labs     09/26/21  0612   WBC 10.0   RBC 4.44   HGB 14.1   HCT 42.7   MCV 96.2   MCH 31.8   MCHC 33.0   RDW 12.7      MPV 9.7       No results for input(s): POCGLU in the last 72 hours. Radiology:   No orders to display       EKG: Normal sinus rhythm  Possible Left atrial enlargement  Borderline ECG  When compared with ECG of 14-DEC-2017 16:02,  No significant change was found  Confirmed by Kristyn Rios ([id number]) on 9/26/2021 9:48:22 AM    ASSESSMENT/PLAN:  COPD with acute exacerbation  Acute respiratory failure with hypoxia and hypercapnia  -Likely due to inhalation  -Steroids, bronchodilators  -Check procalcitonin, hold off on antibiotics  -Wean down oxygen as tolerated  -Resting comfortably and not in acute distress when seen. Repeat ABG. May need BiPAP depending on results    Polysubstance abuse  Heroin overdose  -No IV drug abuse. States it is sporadic and has not used in a month.  -Start withdrawal protocol if she develops symptoms    Tobacco abuse  -Declines nicotine patch    Anxiety and depression  -Resume home regimen      DVT prophylaxis: lovenox      NOTE: This report was transcribed using voice recognition software.  Every effort was made to ensure accuracy; however, inadvertent computerized transcription errors may be present.   Electronically signed by Liam Negrete MD on 9/26/2021 at 7:40 PM

## 2021-09-26 NOTE — ED PROVIDER NOTES
Lio Rock is a 44 y.o. female presenting to the ED for heroin overdose, beginning pta ago. The complaint has been intermittent, mild in severity, and worsened by nothing. Is a 75-year-old female she has history of COPD and polysubstance abuse. Patient states she snorted heroin and cocaine tonight EMS was called after patient became unresponsive when EMS had arrived patient was alert awake and oriented x3 as her friend had given her 4 mg of intranasal Narcan. Patient currently has no complaints. She is cooperative. sHe denies any SI or HI. Review of Systems:   Pertinent positives and negatives are stated within HPI, all other systems reviewed and are negative.          --------------------------------------------- PAST HISTORY ---------------------------------------------  Past Medical History:  has a past medical history of Anxiety and depression, Asthma, Chronic pelvic pain in female, COPD (chronic obstructive pulmonary disease) ([facility] [address] Left hip pain, Left leg pain, Neuropathy, Osteoarthritis, Post-menopausal bleeding, Thickened endometrium, and Tobacco abuse. Past Surgical History:  has a past surgical history that includes bladder repair (2011); LEEP (2004); Tubal ligation; Dilation and curettage of uterus (10/09/12); hysteroscopy (10/09/12); Dilation and curettage of uterus (3/12/13); other surgical history (8/9/2013); Dilation and curettage of uterus (N/A, 4/23/2019); Colonoscopy (05/07/2019); Colonoscopy (N/A, 5/7/2019); Tonsillectomy; pr repair esophagus opening (N/A, 2/7/2020); Biopsy mouth lesion (N/A, 2/7/2020); and Dilation and curettage of uterus (N/A, 10/20/2020). Social History:  reports that she has been smoking. She has a 17.00 pack-year smoking history. She has never used smokeless tobacco. She reports current alcohol use. She reports current drug use. Drugs: Marijuana and Opiates .     Family History: family history includes Alcohol Abuse in her father and sister; Arthritis in her maternal aunt, maternal grandfather, maternal grandmother, maternal uncle, and mother; COPD in her mother; Cancer in her maternal grandfather and maternal grandmother; Diabetes in her mother; High Blood Pressure in her maternal grandmother, mother, and sister; High Cholesterol in her sister; Osteoporosis in her mother; Substance Abuse in her paternal grandfather and sister. The patients home medications have been reviewed. Allergies: Patient has no known allergies. -------------------------------------------------- RESULTS -------------------------------------------------  All laboratory and radiology results have been personally reviewed by myself   LABS:  No results found for this visit on 09/26/21. RADIOLOGY:  Interpreted by Radiologist.  XR CHEST PORTABLE   Final Result   No acute cardiopulmonary findings. PA and lateral views would be useful for   further assessment, if symptoms persist.      Questionable vague subcentimeter left upper lobe nodule. A nonemergent CT   would be useful to further characterize.             ------------------------- NURSING NOTES AND VITALS REVIEWED ---------------------------   The nursing notes within the ED encounter and vital signs as below have been reviewed. BP (!) 160/98   Pulse 90   Temp 98.8 °F (37.1 °C)   Resp 15   Ht 5' 1\" (1.549 m)   Wt 120 lb (54.4 kg)   LMP 12/14/2017   SpO2 92%   BMI 22.67 kg/m²   Oxygen Saturation Interpretation: Normal      ---------------------------------------------------PHYSICAL EXAM--------------------------------------    Physical Exam  Vitals reviewed. Constitutional:       General: She is not in acute distress. Appearance: Normal appearance. She is not ill-appearing, toxic-appearing or diaphoretic. HENT:      Head: Normocephalic and atraumatic. Right Ear: External ear normal.      Left Ear: External ear normal.      Nose: Nose normal. No congestion.       Mouth/Throat: Mouth: Mucous membranes are moist.      Pharynx: Oropharynx is clear. No posterior oropharyngeal erythema. Eyes:      Extraocular Movements: Extraocular movements intact. Pupils: Pupils are equal, round, and reactive to light. Cardiovascular:      Rate and Rhythm: Normal rate and regular rhythm. Pulses: Normal pulses. Heart sounds: No murmur heard. Pulmonary:      Effort: Pulmonary effort is normal.      Breath sounds: No wheezing or rhonchi. Chest:      Chest wall: No tenderness. Abdominal:      General: Bowel sounds are normal.      Tenderness: There is no abdominal tenderness. There is no right CVA tenderness, left CVA tenderness or guarding. Musculoskeletal:         General: No swelling or deformity. Cervical back: Normal range of motion and neck supple. No muscular tenderness. Skin:     General: Skin is warm and dry. Capillary Refill: Capillary refill takes less than 2 seconds. Neurological:      General: No focal deficit present. Mental Status: She is alert and oriented to person, place, and time. Cranial Nerves: No cranial nerve deficit. Sensory: No sensory deficit. Motor: No weakness. Coordination: Coordination normal.   Psychiatric:         Mood and Affect: Mood normal.               ------------------------------ ED COURSE/MEDICAL DECISION MAKING----------------------  Medications   naloxone (NALOXONE TO-GO) 4 mg/0.1 mL nasal spray (has no administration in time range)   ipratropium-albuterol (DUONEB) nebulizer solution 1 ampule (1 ampule Inhalation Given 9/26/21 6030)   dexamethasone (PF) (DECADRON) injection 10 mg (has no administration in time range)   ondansetron (ZOFRAN-ODT) disintegrating tablet 4 mg (4 mg Oral Given 9/26/21 3668)         ED COURSE:       Medical Decision Making:    Pt presented s/p heroin overdose pt is aaox3 appears awake and ventilating.  Pt states she has copd, pt remains hypoxic and requiring 3-4 l nc. duonebs given, solumedrol and labs orderd. cxr shows lung nodule. Pt to be signed out to day time doctor for final disposition,. Testing pending      Counseling: The emergency provider has spoken with the patient and discussed todays results, in addition to providing specific details for the plan of care and counseling regarding the diagnosis and prognosis. Questions are answered at this time and they are agreeable with the plan.      --------------------------------- IMPRESSION AND DISPOSITION ---------------------------------    IMPRESSION  1. Accidental overdose of heroin, initial encounter ([facility] [address]    2. Polysubstance abuse (HCC)    3. Lung nodule    4. Hypoxia        DISPOSITION  Disposition: Other Disposition: pending  Patient condition is fair      NOTE: This report was transcribed using voice recognition software.  Every effort was made to ensure accuracy; however, inadvertent computerized transcription errors may be present       Joe Frank DO  09/26/21 8688

## 2021-09-26 NOTE — PROGRESS NOTES
Called [address] made aware direct admit from [facility] is here. . will need admission orders

## 2021-09-27 ENCOUNTER — APPOINTMENT (OUTPATIENT)
Dept: GENERAL RADIOLOGY | Age: 51
DRG: 816 | End: 2021-09-27
Attending: INTERNAL MEDICINE
Payer: COMMERCIAL

## 2021-09-27 VITALS
BODY MASS INDEX: 22.66 KG/M2 | WEIGHT: 120 LBS | RESPIRATION RATE: 16 BRPM | OXYGEN SATURATION: 96 % | TEMPERATURE: 98.2 F | SYSTOLIC BLOOD PRESSURE: 138 MMHG | HEIGHT: 61 IN | DIASTOLIC BLOOD PRESSURE: 75 MMHG | HEART RATE: 75 BPM

## 2021-09-27 LAB
ADENOVIRUS BY PCR: NOT DETECTED
BORDETELLA PARAPERTUSSIS BY PCR: NOT DETECTED
BORDETELLA PERTUSSIS BY PCR: NOT DETECTED
CHLAMYDOPHILIA PNEUMONIAE BY PCR: NOT DETECTED
CORONAVIRUS 229E BY PCR: NOT DETECTED
CORONAVIRUS HKU1 BY PCR: NOT DETECTED
CORONAVIRUS NL63 BY PCR: NOT DETECTED
CORONAVIRUS OC43 BY PCR: NOT DETECTED
HUMAN METAPNEUMOVIRUS BY PCR: NOT DETECTED
HUMAN RHINOVIRUS/ENTEROVIRUS BY PCR: NOT DETECTED
INFLUENZA A BY PCR: NOT DETECTED
INFLUENZA B BY PCR: NOT DETECTED
MYCOPLASMA PNEUMONIAE BY PCR: NOT DETECTED
PARAINFLUENZA VIRUS 1 BY PCR: NOT DETECTED
PARAINFLUENZA VIRUS 2 BY PCR: NOT DETECTED
PARAINFLUENZA VIRUS 3 BY PCR: NOT DETECTED
PARAINFLUENZA VIRUS 4 BY PCR: NOT DETECTED
PROCALCITONIN: 0.03 NG/ML (ref 0–0.08)
RESPIRATORY SYNCYTIAL VIRUS BY PCR: NOT DETECTED
SARS-COV-2, PCR: NOT DETECTED

## 2021-09-27 PROCEDURE — 6360000002 HC RX W HCPCS: Performed by: INTERNAL MEDICINE

## 2021-09-27 PROCEDURE — 6370000000 HC RX 637 (ALT 250 FOR IP): Performed by: INTERNAL MEDICINE

## 2021-09-27 PROCEDURE — 84145 PROCALCITONIN (PCT): CPT

## 2021-09-27 PROCEDURE — 36415 COLL VENOUS BLD VENIPUNCTURE: CPT

## 2021-09-27 PROCEDURE — 0202U NFCT DS 22 TRGT SARS-COV-2: CPT

## 2021-09-27 PROCEDURE — 94640 AIRWAY INHALATION TREATMENT: CPT

## 2021-09-27 PROCEDURE — 99239 HOSP IP/OBS DSCHRG MGMT >30: CPT | Performed by: INTERNAL MEDICINE

## 2021-09-27 PROCEDURE — 2580000003 HC RX 258: Performed by: INTERNAL MEDICINE

## 2021-09-27 PROCEDURE — 2700000000 HC OXYGEN THERAPY PER DAY

## 2021-09-27 PROCEDURE — 71045 X-RAY EXAM CHEST 1 VIEW: CPT

## 2021-09-27 RX ORDER — PREDNISONE 20 MG/1
40 TABLET ORAL DAILY
Qty: 20 TABLET | Refills: 0 | Status: SHIPPED | OUTPATIENT
Start: 2021-09-27 | End: 2021-10-02

## 2021-09-27 RX ORDER — AMOXICILLIN AND CLAVULANATE POTASSIUM 875; 125 MG/1; MG/1
1 TABLET, FILM COATED ORAL 2 TIMES DAILY
Qty: 14 TABLET | Refills: 0 | Status: SHIPPED | OUTPATIENT
Start: 2021-09-27 | End: 2021-10-02

## 2021-09-27 RX ADMIN — METHYLPREDNISOLONE SODIUM SUCCINATE 40 MG: 40 INJECTION, POWDER, LYOPHILIZED, FOR SOLUTION INTRAMUSCULAR; INTRAVENOUS at 02:09

## 2021-09-27 RX ADMIN — SODIUM CHLORIDE, PRESERVATIVE FREE 10 ML: 5 INJECTION INTRAVENOUS at 08:16

## 2021-09-27 RX ADMIN — METHYLPREDNISOLONE SODIUM SUCCINATE 40 MG: 40 INJECTION, POWDER, LYOPHILIZED, FOR SOLUTION INTRAMUSCULAR; INTRAVENOUS at 15:22

## 2021-09-27 RX ADMIN — IPRATROPIUM BROMIDE AND ALBUTEROL SULFATE 1 AMPULE: .5; 3 SOLUTION RESPIRATORY (INHALATION) at 08:44

## 2021-09-27 RX ADMIN — ARIPIPRAZOLE 2 MG: 2 TABLET ORAL at 08:14

## 2021-09-27 RX ADMIN — METHYLPREDNISOLONE SODIUM SUCCINATE 40 MG: 40 INJECTION, POWDER, LYOPHILIZED, FOR SOLUTION INTRAMUSCULAR; INTRAVENOUS at 08:15

## 2021-09-27 RX ADMIN — IPRATROPIUM BROMIDE AND ALBUTEROL SULFATE 1 AMPULE: .5; 3 SOLUTION RESPIRATORY (INHALATION) at 12:25

## 2021-09-27 RX ADMIN — IPRATROPIUM BROMIDE AND ALBUTEROL SULFATE 1 AMPULE: .5; 3 SOLUTION RESPIRATORY (INHALATION) at 17:18

## 2021-09-27 RX ADMIN — PAROXETINE HYDROCHLORIDE 60 MG: 20 TABLET, FILM COATED ORAL at 08:14

## 2021-09-27 ASSESSMENT — PAIN SCALES - GENERAL
PAINLEVEL_OUTOF10: 0

## 2021-09-27 NOTE — CARE COORDINATION
Social Work / Discharge Planning : Patient admitted after Heroin overdose. Referral called to [facility] from [facility] and she will see patient this afternoon. Patient currently on 4  Liters of oxygen and this is NEW. Can order from [facility] provider. Patient has PCP. AWait plan. SW to follow. Electronically signed by Sherolyn Canavan, LSW on 9/27/21 at 12:09 PM EDT  .

## 2021-09-27 NOTE — CARE COORDINATION
Peer Recovery Support Note    Name: Larry Adrian  Date: 9/27/2021    No chief complaint on file. Peer Support met with patient. [x] Support and education provided  [x] Resources provided   [] Treatment referral:   [] Other:   [] Patient declined peer recovery services     Referred By: Abraham Santos. Notes: Patient said she goes to [employer]. Has gone to meetings in the past and would like to get back into going. I gave her an up to date AA meeting schedule.     SignedMaria Guadalupe Hui, 9/27/2021

## 2021-09-27 NOTE — PROGRESS NOTES
Discharge instructions reviewed with patient. No questions at this time. Patient walked downstairs, refused wheelchair.

## 2021-09-27 NOTE — DISCHARGE SUMMARY
[facility] Physician Discharge Summary       No follow-up provider specified. Activity level: As tolerated     Dispo: Home    Condition on discharge: Stable     Patient ID:  Roque Iraheta  [id number]  44 y.o.  1970    Admit date: 9/26/2021    Discharge date and time:  9/27/2021  4:10 PM    Admission Diagnoses: Active Problems:    Hypercapnic respiratory failure (HCC)  Resolved Problems:    * No resolved hospital problems. *      Discharge Diagnoses: Active Problems:    Hypercapnic respiratory failure (HCC)  Resolved Problems:    * No resolved hospital problems. *      Consults:  None    Procedures: None    Hospital Course:   Patient Roque Iraheta is a 44 y.o. presented with Hypercapnic respiratory failure ([facility] []    20-year-old female presents to the hospital after overdose of cocaine and heroin. Patient was found to be encephalopathic. She is brought to the ER. Currently patient is back to her baseline. Chest x-ray was done which was unremarkable. Patient was found to have acute COPD exacerbation. She was started on oxygen. Patient was treated with antibiotics as well as steroids. She is currently medically stable for discharge.     Discharge Exam:    General Appearance: alert and oriented to person, place and time and in no acute distress  Skin: warm and dry  Head: normocephalic and atraumatic  Eyes: pupils equal, round, and reactive to light, extraocular eye movements intact, conjunctivae normal  Neck: neck supple and non tender without mass   Pulmonary/Chest: clear to auscultation bilaterally- no wheezes, rales or rhonchi, normal air movement, no respiratory distress  Cardiovascular: normal rate, normal S1 and S2 and no carotid bruits  Abdomen: soft, non-tender, non-distended, normal bowel sounds, no masses or organomegaly  Extremities: no cyanosis, no clubbing and no edema  Neurologic: no cranial nerve deficit and speech normal    No intake/output data recorded. No intake/output data recorded. LABS:  Recent Labs     09/26/21  0612      K 4.3      CO2 28   BUN 10   CREATININE 0.9   GLUCOSE 97   CALCIUM 9.8       Recent Labs     09/26/21  0612   WBC 10.0   RBC 4.44   HGB 14.1   HCT 42.7   MCV 96.2   MCH 31.8   MCHC 33.0   RDW 12.7      MPV 9.7       No results for input(s): POCGLU in the last 72 hours. Imaging:  CT CHEST WO CONTRAST    Result Date: 9/26/2021  EXAMINATION: CT OF THE CHEST WITHOUT CONTRAST 9/26/2021 7:11 am TECHNIQUE: CT of the chest was performed without the administration of intravenous contrast. Multiplanar reformatted images are provided for review. Dose modulation, iterative reconstruction, and/or weight based adjustment of the mA/kV was utilized to reduce the radiation dose to as low as reasonably achievable. COMPARISON: 09/21/2014 HISTORY: ORDERING SYSTEM PROVIDED HISTORY: hypoxia lung nodule TECHNOLOGIST PROVIDED HISTORY: Reason for exam:->hypoxia lung nodule Decision Support Exception - unselect if not a suspected or confirmed emergency medical condition->Emergency Medical Condition (MA) FINDINGS: Lungs and pleura: Upper lobe mild centrilobular emphysema. Right basilar mild dependent atelectasis or scarring. Medial right lung base calcified granuloma, unchanged. Axial series 3, image 29, focal pleural thickening or pleural base nodule, posterior segment of the left upper lobe, stable compared to 2014, a benign finding. No new or suspicious pulmonary lesion. No acute pulmonary infiltrate or pleural effusion. The central airways are clear. Heart and mediastinum: Normal heart size. No pericardial effusion. Subcarinal and right hilar benign appearing calcified lymph nodes. No suspicious lymph node enlargement. Great vessels: The thoracic aorta and pulmonary arteries are normal in caliber. Upper abdomen: No acute disease. Bones: No acute osseous abnormality. 1.  No pneumonia or acute cardiopulmonary disease. No suspicious pulmonary lesion. 2.  Mild centrilobular emphysema. Old granulomatous disease. Right basilar mild dependent atelectasis or scarring. XR CHEST PORTABLE    Result Date: 9/27/2021  EXAMINATION: ONE XRAY VIEW OF THE CHEST 9/27/2021 9:59 am COMPARISON: None. HISTORY: ORDERING SYSTEM PROVIDED HISTORY: sob FINDINGS: A single portable AP view of the chest was obtained. Heart, mediastinum, and pulmonary vasculature are within normal limits. There is minimal atelectasis or scarring within the lateral right lung base. There is a nodule without definite calcification in the left upper lung between the anterior 1st and 2nd ribs which measures approximately 5 mm in size. There is a probable calcified granuloma in the medial right lung base. Lungs and pleural spaces are otherwise clear. There is a 5 mm nodule in the left upper lobe without definite calcification on plain film. Consider PA and lateral views of the chest in 6-12 months. XR CHEST PORTABLE    Result Date: 9/26/2021  EXAMINATION: ONE XRAY VIEW OF THE CHEST 9/26/2021 3:46 am COMPARISON: 10/24/2019 HISTORY: ORDERING SYSTEM PROVIDED HISTORY: heroin overdose TECHNOLOGIST PROVIDED HISTORY: Reason for exam:->heroin overdose FINDINGS: EKG leads overlie the chest.  Heart size is normal.  No pneumothorax. No focal consolidation or effusion. There is a questionable vague subcentimeter nodule in the left upper chest between the anterior 1st and 2nd ribs, not definitely seen previously. No acute cardiopulmonary findings. PA and lateral views would be useful for further assessment, if symptoms persist. Questionable vague subcentimeter left upper lobe nodule. A nonemergent CT would be useful to further characterize.        Patient Instructions:      Medication List      START taking these medications    amoxicillin-clavulanate 875-125 MG per tablet  Commonly known as: AUGMENTIN  Take 1 tablet by mouth 2 times daily for 5 days     predniSONE 20 MG tablet  Commonly known as: DELTASONE  Take 2 tablets by mouth daily for 5 days        CONTINUE taking these medications    acetaminophen 500 MG tablet  Commonly known as: APAP Extra Strength  Take 1 tablet by mouth every 6 hours as needed for Pain     * albuterol sulfate  (90 Base) MCG/ACT inhaler  inhale 2 puffs by mouth every 6 hours if needed     * albuterol (5 MG/ML) 0.5% nebulizer solution  Commonly known as: PROVENTIL  Take 1 mL by nebulization 4 times daily as needed for Wheezing     ARIPiprazole 2 MG tablet  Commonly known as: ABILIFY     cloNIDine 0.2 MG tablet  Commonly known as: CATAPRES     HYDROcodone-acetaminophen 5-325 MG per tablet  Commonly known as: NORCO     ibuprofen 600 MG tablet  Commonly known as: ADVIL;MOTRIN  take 1 tablet by mouth three times a day if needed for pain     nicotine 21 MG/24HR  Commonly known as: NICODERM CQ  Place 1 patch onto the skin daily     * PARoxetine 10 MG tablet  Commonly known as: PAXIL     * PARoxetine 30 MG tablet  Commonly known as: PAXIL     Spiriva Respimat 2.5 MCG/ACT Aers inhaler  Generic drug: tiotropium  Take daily     tiZANidine 2 MG tablet  Commonly known as: ZANAFLEX  Take 1 tablet by mouth 2 times daily as needed (neck tightness)     vitamin C 250 MG tablet     Vitamin D3 125 MCG (5000 UT) Tabs         * This list has 4 medication(s) that are the same as other medications prescribed for you. Read the directions carefully, and ask your doctor or other care provider to review them with you.             STOP taking these medications    diazePAM 10 MG tablet  Commonly known as: VALIUM     QUEtiapine 100 MG tablet  Commonly known as: SEROQUEL     QUEtiapine 25 MG tablet  Commonly known as: SEROQUEL           Where to Get Your Medications      You can get these medications from any pharmacy    Bring a paper prescription for each of these medications  · amoxicillin-clavulanate 875-125 MG per tablet  · predniSONE 20 MG tablet           Note that more than 30 minutes was spent in preparing discharge papers, discussing discharge with patient, medication review, etc.    Signed:  Electronically signed by Maria Isabel Iraheta DO on 9/27/2021 at 4:10 PM

## 2021-09-27 NOTE — PLAN OF CARE
Problem:  Activity Intolerance:  Goal: Ability to tolerate increased activity will improve  Description: Ability to tolerate increased activity will improve  Outcome: Met This Shift

## 2021-09-27 NOTE — PLAN OF CARE
Problem: Discharge Planning:  Goal: Discharged to appropriate level of care  Description: Discharged to appropriate level of care  Outcome: Completed     Problem:  Activity Intolerance:  Goal: Ability to tolerate increased activity will improve  Description: Ability to tolerate increased activity will improve  9/27/2021 1608 by Wilbur Suggs RN  Outcome: Completed  9/27/2021 0334 by River Lemon RN  Outcome: Met This Shift     Problem: Airway Clearance - Ineffective:  Goal: Ability to maintain a clear airway will improve  Description: Ability to maintain a clear airway will improve  9/27/2021 1608 by Wilbur Suggs RN  Outcome: Completed  9/27/2021 0334 by River Lemon RN  Outcome: Met This Shift     Problem: Breathing Pattern - Ineffective:  Goal: Ability to achieve and maintain a regular respiratory rate will improve  Description: Ability to achieve and maintain a regular respiratory rate will improve  9/27/2021 1608 by Wilbur Suggs RN  Outcome: Completed  9/27/2021 0334 by River Lemon RN  Outcome: Met This Shift     Problem: Gas Exchange - Impaired:  Goal: Levels of oxygenation will improve  Description: Levels of oxygenation will improve  9/27/2021 1608 by Wilbur Suggs RN  Outcome: Completed  9/27/2021 0334 by River Lemon RN  Outcome: Met This Shift

## 2021-09-29 ENCOUNTER — TELEPHONE (OUTPATIENT)
Dept: FAMILY MEDICINE CLINIC | Age: 51
End: 2021-09-29

## 2021-09-29 DIAGNOSIS — J44.9 CHRONIC OBSTRUCTIVE PULMONARY DISEASE, UNSPECIFIED COPD TYPE (HCC): Primary | ICD-10-CM

## 2021-09-29 DIAGNOSIS — J45.909 UNCOMPLICATED ASTHMA, UNSPECIFIED ASTHMA SEVERITY, UNSPECIFIED WHETHER PERSISTENT: ICD-10-CM

## 2021-09-29 DIAGNOSIS — Z72.0 NICOTINE USE: ICD-10-CM

## 2021-10-01 NOTE — TELEPHONE ENCOUNTER
Okay I have palced a new referral  Please call Cuba Mendoza to remind her that she will have to make the appointment; we discussed compliance at our last visit.  Thank you

## 2021-10-04 DIAGNOSIS — J44.9 CHRONIC OBSTRUCTIVE PULMONARY DISEASE, UNSPECIFIED COPD TYPE (HCC): ICD-10-CM

## 2021-10-04 NOTE — TELEPHONE ENCOUNTER
Last Appointment:  9/20/2021  Future Appointments   Date Time Provider Es Daly   10/20/2021  2:00 PM MD Dereje Haney [facility]    patient lost medication and needs new Rx

## 2021-10-04 NOTE — TELEPHONE ENCOUNTER
----- Message from Mohinder Sexton sent at 10/4/2021  9:13 AM EDT -----  Subject: Refill Request    QUESTIONS  Name of Medication? albuterol sulfate  (90 Base) MCG/ACT inhaler  Patient-reported dosage and instructions? as needed  How many days do you have left? 0  Preferred Pharmacy? [id number] [facility]. Pharmacy phone number (if available)? [phone]  ---------------------------------------------------------------------------  --------------,  Name of Medication? albuterol (PROVENTIL) (5 MG/ML) 0.5% nebulizer   solution  Patient-reported dosage and instructions? as needed  How many days do you have left? 0  Preferred Pharmacy? [id number] [place] [address]. Pharmacy phone number (if available)? [phone]  Additional Information for Provider? Markie Washington reaching out she has   misplaced her medication and cannot not find it. She is in the middle of   moving and lost it.   ---------------------------------------------------------------------------  --------------  CALL BACK INFO  What is the best way for the office to contact you? OK to leave message on   voicemail  Preferred Call Back Phone Number?  [phone]

## 2021-10-07 ENCOUNTER — TELEPHONE (OUTPATIENT)
Dept: FAMILY MEDICINE CLINIC | Age: 51
End: 2021-10-07

## 2021-10-07 DIAGNOSIS — Z76.0 MEDICATION REFILL: ICD-10-CM

## 2021-10-07 DIAGNOSIS — J44.9 CHRONIC OBSTRUCTIVE PULMONARY DISEASE, UNSPECIFIED COPD TYPE (HCC): ICD-10-CM

## 2021-10-07 DIAGNOSIS — Z23 NEEDS FLU SHOT: Primary | ICD-10-CM

## 2021-10-07 NOTE — TELEPHONE ENCOUNTER
Yvette Andrade called in and she went and picked up her albuterol and it was not the mixed one. Can you please order the solution so that she may use the nebulizer.     Thank you

## 2021-10-08 RX ORDER — IPRATROPIUM BROMIDE AND ALBUTEROL SULFATE 2.5; .5 MG/3ML; MG/3ML
1 SOLUTION RESPIRATORY (INHALATION) EVERY 4 HOURS PRN
Qty: 360 ML | Refills: 3 | Status: SHIPPED | OUTPATIENT
Start: 2021-10-08

## 2021-10-08 RX ORDER — ALBUTEROL SULFATE 90 UG/1
AEROSOL, METERED RESPIRATORY (INHALATION)
Qty: 18 G | Refills: 5 | Status: SHIPPED
Start: 2021-10-08 | End: 2022-09-15 | Stop reason: SDUPTHER

## 2021-10-08 NOTE — TELEPHONE ENCOUNTER
Ordered it  Please advise patient to come in for flu shot- I also ordered than incase she changes her mind.   With her COPD and nicotine use higher risk complications against the flu    Also, she is to establish with Pulmonary (me&her have spoken about this already)

## 2021-10-12 ENCOUNTER — HOSPITAL ENCOUNTER (OUTPATIENT)
Dept: GENERAL RADIOLOGY | Age: 51
Discharge: HOME | End: 2021-10-14
Payer: COMMERCIAL

## 2021-10-12 DIAGNOSIS — Z12.31 VISIT FOR SCREENING MAMMOGRAM: ICD-10-CM

## 2021-10-12 PROCEDURE — 77063 BREAST TOMOSYNTHESIS BI: CPT

## 2021-10-13 DIAGNOSIS — R92.2 DENSE BREAST TISSUE ON MAMMOGRAM: Primary | ICD-10-CM

## 2021-11-03 ENCOUNTER — TELEPHONE (OUTPATIENT)
Dept: FAMILY MEDICINE CLINIC | Age: 51
End: 2021-11-03

## 2021-11-03 NOTE — TELEPHONE ENCOUNTER
----- Message from Gardiner sent at 11/2/2021  3:52 PM EDT -----  Subject: Message to Provider    QUESTIONS  Information for Provider? Pt is calling because she is waking up with   headaches for the last two mornings. Pt is asking is it because she is   getting to much oxygen. Pt oxygen is on 4 liters. Pt is also having cough. Pt is asking for her provider to call in something for this cough. Please   call pt back. ---------------------------------------------------------------------------  --------------  Aj LARSEN  What is the best way for the office to contact you? OK to leave message on   voicemail  Preferred Call Back Phone Number? [phone]  ---------------------------------------------------------------------------  --------------  SCRIPT ANSWERS  Relationship to Patient?  Self

## 2021-11-04 DIAGNOSIS — R05.9 COUGH: Primary | ICD-10-CM

## 2021-11-04 RX ORDER — BENZONATATE 100 MG/1
100 CAPSULE ORAL 2 TIMES DAILY PRN
Qty: 14 CAPSULE | Refills: 0 | Status: SHIPPED | OUTPATIENT
Start: 2021-11-04 | End: 2021-11-11

## 2021-11-04 NOTE — TELEPHONE ENCOUNTER
Spoke to patient reported headaches improving when turning down oxygen to 3 L. I will send in [facility] for her cough. Patient has close follow-up set up already.

## 2021-11-04 NOTE — TELEPHONE ENCOUNTER
Worsening cough for past couple of days. Denies any recent sick contacts. Denies any fevers chills or worsening shortness of breath. We will send in [facility] for cough suppression. Patient advised to follow-up with PCP if signs of infection begin to occur such as shortness of breath worsen, has increased fevers, and change in sputum production.

## 2021-11-16 ENCOUNTER — OFFICE VISIT (OUTPATIENT)
Dept: FAMILY MEDICINE CLINIC | Age: 51
End: 2021-11-16
Payer: COMMERCIAL

## 2021-11-16 VITALS
RESPIRATION RATE: 20 BRPM | DIASTOLIC BLOOD PRESSURE: 96 MMHG | HEIGHT: 61 IN | WEIGHT: 120 LBS | TEMPERATURE: 98.8 F | BODY MASS INDEX: 22.66 KG/M2 | SYSTOLIC BLOOD PRESSURE: 141 MMHG | HEART RATE: 87 BPM | OXYGEN SATURATION: 93 %

## 2021-11-16 DIAGNOSIS — Z23 NEEDS FLU SHOT: ICD-10-CM

## 2021-11-16 DIAGNOSIS — M25.569 KNEE PAIN, UNSPECIFIED CHRONICITY, UNSPECIFIED LATERALITY: ICD-10-CM

## 2021-11-16 DIAGNOSIS — J44.9 CHRONIC OBSTRUCTIVE PULMONARY DISEASE, UNSPECIFIED COPD TYPE (HCC): Primary | ICD-10-CM

## 2021-11-16 DIAGNOSIS — I10 HYPERTENSION, UNSPECIFIED TYPE: ICD-10-CM

## 2021-11-16 DIAGNOSIS — Z76.0 MEDICATION REFILL: ICD-10-CM

## 2021-11-16 PROCEDURE — 99212 OFFICE O/P EST SF 10 MIN: CPT | Performed by: STUDENT IN AN ORGANIZED HEALTH CARE EDUCATION/TRAINING PROGRAM

## 2021-11-16 PROCEDURE — 3023F SPIROM DOC REV: CPT | Performed by: FAMILY MEDICINE

## 2021-11-16 PROCEDURE — G8420 CALC BMI NORM PARAMETERS: HCPCS | Performed by: FAMILY MEDICINE

## 2021-11-16 PROCEDURE — G8926 SPIRO NO PERF OR DOC: HCPCS | Performed by: FAMILY MEDICINE

## 2021-11-16 PROCEDURE — 99213 OFFICE O/P EST LOW 20 MIN: CPT | Performed by: STUDENT IN AN ORGANIZED HEALTH CARE EDUCATION/TRAINING PROGRAM

## 2021-11-16 PROCEDURE — G8482 FLU IMMUNIZE ORDER/ADMIN: HCPCS | Performed by: FAMILY MEDICINE

## 2021-11-16 PROCEDURE — 6360000002 HC RX W HCPCS

## 2021-11-16 PROCEDURE — 3017F COLORECTAL CA SCREEN DOC REV: CPT | Performed by: FAMILY MEDICINE

## 2021-11-16 PROCEDURE — G8427 DOCREV CUR MEDS BY ELIG CLIN: HCPCS | Performed by: FAMILY MEDICINE

## 2021-11-16 PROCEDURE — 90686 IIV4 VACC NO PRSV 0.5 ML IM: CPT

## 2021-11-16 PROCEDURE — G0008 ADMIN INFLUENZA VIRUS VAC: HCPCS

## 2021-11-16 PROCEDURE — 4004F PT TOBACCO SCREEN RCVD TLK: CPT | Performed by: FAMILY MEDICINE

## 2021-11-16 RX ORDER — TIOTROPIUM BROMIDE INHALATION SPRAY 3.12 UG/1
SPRAY, METERED RESPIRATORY (INHALATION)
Qty: 4 G | Refills: 2 | Status: SHIPPED
Start: 2021-11-16 | End: 2022-02-23

## 2021-11-16 RX ORDER — BUDESONIDE 0.25 MG/2ML
1 INHALANT ORAL 2 TIMES DAILY
Qty: 60 EACH | Refills: 3 | Status: SHIPPED | OUTPATIENT
Start: 2021-11-16

## 2021-11-16 RX ORDER — DIAZEPAM 5 MG/1
TABLET ORAL
COMMUNITY
Start: 2021-11-15

## 2021-11-16 RX ORDER — HYDROCHLOROTHIAZIDE 12.5 MG/1
12.5 CAPSULE, GELATIN COATED ORAL DAILY
Qty: 30 CAPSULE | Refills: 3 | Status: SHIPPED
Start: 2021-11-16 | End: 2022-03-25

## 2021-11-16 RX ORDER — QUETIAPINE FUMARATE 25 MG/1
TABLET, FILM COATED ORAL
COMMUNITY
Start: 2021-10-12

## 2021-11-16 NOTE — PATIENT INSTRUCTIONS
Patient Education        Knee Arthritis: Exercises  Introduction  Here are some examples of exercises for you to try. The exercises may be suggested for a condition or for rehabilitation. Start each exercise slowly. Ease off the exercises if you start to have pain. You will be told when to start these exercises and which ones will work best for you. How to do the exercises  Knee flexion with heel slide    1. Lie on your back with your knees bent. 2. Slide your heel back by bending your affected knee as far as you can. Then hook your other foot around your ankle to help pull your heel even farther back. 3. Hold for about 6 seconds, then rest for up to 10 seconds. 4. Repeat 8 to 12 times. 5. Switch legs and repeat steps 1 through 4, even if only one knee is sore. Quad sets    1. Sit with your affected leg straight and supported on the floor or a firm bed. Place a small, rolled-up towel under your knee. Your other leg should be bent, with that foot flat on the floor. 2. Tighten the thigh muscles of your affected leg by pressing the back of your knee down into the towel. 3. Hold for about 6 seconds, then rest for up to 10 seconds. 4. Repeat 8 to 12 times. 5. Switch legs and repeat steps 1 through 4, even if only one knee is sore. Straight-leg raises to the front    1. Lie on your back with your good knee bent so that your foot rests flat on the floor. Your affected leg should be straight. Make sure that your low back has a normal curve. You should be able to slip your hand in between the floor and the small of your back, with your palm touching the floor and your back touching the back of your hand. 2. Tighten the thigh muscles in your affected leg by pressing the back of your knee flat down to the floor. Hold your knee straight. 3. Keeping the thigh muscles tight and your leg straight, lift your affected leg up so that your heel is about 12 inches off the floor.  Hold for about 6 seconds, then lower slowly. 4. Relax for up to 10 seconds between repetitions. 5. Repeat 8 to 12 times. 6. Switch legs and repeat steps 1 through 5, even if only one knee is sore. Active knee flexion    1. Lie on your stomach with your knees straight. If your kneecap is uncomfortable, roll up a washcloth and put it under your leg just above your kneecap. 2. Lift the foot of your affected leg by bending the knee so that you bring the foot up toward your buttock. If this motion hurts, try it without bending your knee quite as far. This may help you avoid any painful motion. 3. Slowly move your leg up and down. 4. Repeat 8 to 12 times. 5. Switch legs and repeat steps 1 through 4, even if only one knee is sore. Quadriceps stretch (facedown)    1. Lie flat on your stomach, and rest your face on the floor. 2. Wrap a towel or belt strap around the lower part of your affected leg. Then use the towel or belt strap to slowly pull your heel toward your buttock until you feel a stretch. 3. Hold for about 15 to 30 seconds, then relax your leg against the towel or belt strap. 4. Repeat 2 to 4 times. 5. Switch legs and repeat steps 1 through 4, even if only one knee is sore. Stationary exercise bike    1. If you do not have a stationary exercise bike at home, you can find one to ride at your local health club or community center. 2. Adjust the height of the bike seat so that your knee is slightly bent when your leg is extended downward. If your knee hurts when the pedal reaches the top, you can raise the seat so that your knee does not bend as much. 3. Start slowly. At first, try to do 5 to 10 minutes of cycling with little to no resistance. Then increase your time and the resistance bit by bit until you can do 20 to 30 minutes without pain. 4. If you start to have pain, rest your knee until your pain gets back to the level that is normal for you. Or cycle for less time or with less effort.   Follow-up care is a key part of your treatment and safety. Be sure to make and go to all appointments, and call your doctor if you are having problems. It's also a good idea to know your test results and keep a list of the medicines you take. Where can you learn more? Go to https://yamileb.[employer]. org and sign in to your [employer] account. Enter C159 in the [employer] box to learn more about \"Knee Arthritis: Exercises. \"     If you do not have an account, please click on the \"Sign Up Now\" link. Current as of: July 1, 2021               Content Version: 13.0  © 9737-6732 [employer]. Care instructions adapted under license by [facility] ([facility]). If you have questions about a medical condition or this instruction, always ask your healthcare professional. [address] any warranty or liability for your use of this information.

## 2021-11-16 NOTE — PROGRESS NOTES
CC: COPD/knee pain/hypertension  ------------------------------------------------------------------------------------------------------------------------  Assessment/Plan    1. Chronic obstructive pulmonary disease, unspecified COPD type [facility])  Pulmonology referral has been made, patient provided contact details to schedule her appointment  Addition of Pulmicort to scheduled Spiriva, will see if this reduces frequency of as needed Zehra  Commended patient for cutting down nicotine use to 3 cigarettes a day. Continue to monitor    - SPIRIVA RESPIMAT 2.5 MCG/ACT AERS inhaler; Take daily  Dispense: 4 g; Refill: 2  - budesonide (PULMICORT) 0.25 MG/2ML nebulizer suspension; Take 2 mLs by nebulization 2 times daily  Dispense: 60 each; Refill: 3    2. Knee pain, unspecified chronicity, unspecified laterality  Likely secondary to osteoarthritis. Imaging has been ordered. Topical Voltaren gel in addition to as needed ibuprofen. Exercises for the knee been provided. If the above does not work, can consider steroid injection at future clinic visit    3. Hypertension, unspecified type  Start HCTZ 12.5 mg.  Counseled patient on exercise, stress management, and also that her ongoing nicotine cessation will help with controlling her blood pressure. Also counseled patient on a low-salt diet    4. Medication refill  - SPIRIVA RESPIMAT 2.5 MCG/ACT AERS inhaler; Take daily  Dispense: 4 g; Refill: 2    5. Needs flu shot  - INFLUENZA, QUADV, 3 YRS AND OLDER, IM PF, PREFILL SYR OR SDV, 0.5ML (AFLURIA QUADV, PF)     Diagnosis PLAN   1. Chronic obstructive pulmonary disease, unspecified COPD type ([facility])  SPIRIVA RESPIMAT 2.5 MCG/ACT AERS inhaler    budesonide (PULMICORT) 0.25 MG/2ML nebulizer suspension   2. Knee pain, unspecified chronicity, unspecified laterality  diclofenac sodium (VOLTAREN) 1 % GEL    XR KNEE RIGHT (3 VIEWS)    XR KNEE LEFT (3 VIEWS)   3.  Hypertension, unspecified type  hydroCHLOROthiazide (MICROZIDE) 12.5 MG therapy at this time. MDD/Anxiety-following [facility] counseling. Next appointment is in 1 week. Taking Valium, Catapres, Abilify, Paxil. Endorses not having any substance abuse at this time and feels her symptoms are well controlled. Stated that relapse and 9/2021 leading to hospitalization was a \" one-time thing\" triggered by break-up.   Has good social support    Needs flu shot-willing to get it    Patient Active Problem List    Diagnosis Date Noted    Hypercapnic respiratory failure ([address] 09/26/2021    Moderate asthma without complication 62/57/3048    Endometrial thickening on ultrasound 09/22/2020    At risk for colon cancer     PMB (postmenopausal bleeding) 04/16/2019    Thrombocytopenia ([address] 12/14/2017    Polysubstance abuse ([address] 06/17/2016    ANAHY (acute kidney injury) ([address] 06/17/2016    Anxiety disorder 11/18/2014    RSD (reflex sympathetic dystrophy) 09/16/2014    Chronic pain syndrome 09/16/2014    Leg pain 07/22/2013    Scalp cyst 07/22/2013    Pelvic pain in female 06/12/2012    Incontinence of urine 11/04/2010    COPD, severe ([address] 10/22/2010    Leg pain, left 10/22/2010    Tobacco abuse 10/22/2010       Past Medical History:   Diagnosis Date    Anxiety and depression     Asthma     controlled     Chronic pelvic pain in female     COPD (chronic obstructive pulmonary disease) ([address]     controlled with inhalers     Left hip pain     8/10 severity    Left leg pain     8/10 severity    Neuropathy     Osteoarthritis     Post-menopausal bleeding     Thickened endometrium     Tobacco abuse        Current Outpatient Medications on File Prior to Visit   Medication Sig Dispense Refill    albuterol sulfate  (90 Base) MCG/ACT inhaler inhale 2 puffs by mouth every 6 hours if needed 18 g 5    ipratropium-albuterol (DUONEB) 0.5-2.5 (3) MG/3ML SOLN nebulizer solution Inhale 3 mLs into the lungs every 4 hours as needed for Shortness of Breath 360 mL 3    ARIPiprazole (ABILIFY) 2 MG tablet Take 2 mg by mouth daily      PARoxetine (PAXIL) 30 MG tablet take 2 tablets by mouth once daily      SPIRIVA RESPIMAT 2.5 MCG/ACT AERS inhaler Take daily 4 g 2    acetaminophen (APAP EXTRA STRENGTH) 500 MG tablet Take 1 tablet by mouth every 6 hours as needed for Pain 20 tablet 0    ibuprofen (ADVIL;MOTRIN) 600 MG tablet take 1 tablet by mouth three times a day if needed for pain 90 tablet 1    PARoxetine (PAXIL) 10 MG tablet Take 10 mg by mouth every morning       Cholecalciferol (VITAMIN D3) 125 MCG (5000 UT) TABS Take by mouth daily      Ascorbic Acid (VITAMIN C) 250 MG tablet Take 250 mg by mouth daily      cloNIDine (CATAPRES) 0.2 MG tablet take 1 tablet by mouth at bedtime  0    albuterol (PROVENTIL) (5 MG/ML) 0.5% nebulizer solution Take 1 mL by nebulization 4 times daily as needed for Wheezing (Patient not taking: Reported on 11/16/2021) 120 each 3    HYDROcodone-acetaminophen (NORCO) 5-325 MG per tablet  (Patient not taking: Reported on 9/20/2021)      tiZANidine (ZANAFLEX) 2 MG tablet Take 1 tablet by mouth 2 times daily as needed (neck tightness) (Patient not taking: Reported on 6/11/2021) 20 tablet 0    nicotine (NICODERM CQ) 21 MG/24HR Place 1 patch onto the skin daily (Patient not taking: Reported on 11/5/2020) 30 patch 1     No current facility-administered medications on file prior to visit.        No Known Allergies    Family History   Problem Relation Age of Onset    Alcohol Abuse Father     COPD Mother     Osteoporosis Mother     Arthritis Mother     Diabetes Mother     High Blood Pressure Mother     High Blood Pressure Sister     High Cholesterol Sister     Substance Abuse Sister     Alcohol Abuse Sister     Arthritis Maternal Aunt     Arthritis Maternal Uncle     Arthritis Maternal Grandmother     Cancer Maternal Grandmother         origin unknown    High Blood Pressure Maternal Grandmother     Arthritis Maternal Grandfather     Colon Cancer Maternal Grandfather     Substance Abuse Paternal Grandfather        Past Surgical History:   Procedure Laterality Date    BIOPSY MOUTH LESION N/A 2020    BIOPSY DORSAL TONGUE performed by Kapil Chowdary MD at [address] [place] [address] Dr S      COLONOSCOPY  2019    diverticula--omari    COLONOSCOPY N/A 2019    COLORECTAL CANCER SCREENING, NOT HIGH RISK performed by Hernan Garcia MD [facility]  10/09/12    DILATION AND CURETTAGE OF UTERUS  3/12/13    with ablation    DILATION AND CURETTAGE OF UTERUS N/A 2019    DILATATION AND CURETTAGE HYSTEROSCOPY performed by Waleska Alexis MD at [address] N/A 10/20/2020    DILATATION AND CURETTAGE HYSTEROSCOPY performed by Lisbeth Baig MD at [address] HYSTEROSCOPY  10/09/12    LEEP  2004    OTHER SURGICAL HISTORY  2013    excision salp lesion    GA REPAIR ESOPHAGUS OPENING N/A 2020    DIRECT LARYNGOSCOPY, ESOPHAGOSCOPY performed by Kapil Chowdary MD at [place] [address]      T&A    TUBAL LIGATION         Social History     Tobacco Use    Smoking status: Current Every Day Smoker     Packs/day: 0.50     Years: 34.00     Pack years: 17.00     Last attempt to quit: 6/10/2016     Years since quittin.4    Smokeless tobacco: Never Used    Tobacco comment: cutting down   Vaping Use    Vaping Use: Never used   Substance Use Topics    Alcohol use: Yes     Alcohol/week: 0.0 standard drinks     Comment: social     Drug use: Yes     Types: Marijuana (Hilaria Leaver), Opiates      Comment: states this was the first time she snorted heroin       ROS:    Review of Systems   Constitutional: Negative for activity change, appetite change, chills and fatigue. HENT: Negative for congestion and sore throat. Respiratory: Negative for choking and chest tightness. Cardiovascular: Negative for chest pain, palpitations and leg swelling. Gastrointestinal: Negative for abdominal distention and abdominal pain. Genitourinary: Negative for difficulty urinating and dysuria. Musculoskeletal: Knee pain, negative for arthralgias   skin: Negative for color change and pallor. Neurological: Negative for facial asymmetry and headaches. Psychiatric/Behavioral: Negative for behavioral problems. The patient is not nervous/anxious. Objective:    VS:  Blood pressure (!) 141/96, pulse 87, temperature 98.8 °F (37.1 °C), temperature source Oral, resp. rate 20, height 5' 1\" (1.549 m), weight 120 lb (54.4 kg), last menstrual period 12/14/2017, SpO2 93 %, not currently breastfeeding. Physical Exam   Constitutional: Oriented to person, place, and time. Well-developed and well-nourished. HENT:   Head: Normocephalic and atraumatic. Eyes: EOM are normal.   Neck: Neck supple. Negative for carotid bruits any   cardiovascular: Normal rate, regular rhythm and intact distal pulses. Exam reveals no gallop and no friction rub. No murmur heard. Pulmonary/Chest: Effort normal and breath sounds normal. No wheezes. No rhales. Abdominal: Soft. Bowel sounds are normal. No distension. No abdominal tenderness. Musculoskeletal:  Left knee-negative varus sign, negative valgus sign, negative Jazmin sign, negative anterior drawer sign. Tenderness to palpation infrapatellar and medial joint line. Some crepitus felt with knee flexion and extension. ROM is intact. Strength is 5 out of 5. No swelling of knee joint  Neurological: Alert and oriented to person, place, and time. Skin: Skin is warm and dry. No rash noted. No erythema. Psychiatric: Normal mood and affect. Behavior is normal.   Nursing note and vitals reviewed. Plans:  As above. Please see Patient Instructions for further counseling and information given.        Advised to please be adherent to the treatment plans discussed today, and please call with any questions or concerns, letting the office know of any reasons that the plans may not be followed. The risks of untreated conditions include worsening illness, injury, disability, and possibly, death. Please call if symptoms change in any way, worsen, or fail to completely resolve, as this could necessitate a change to treatment plans. Patient and/or caregiver expressed understanding. Indications and proper use of medication(s) reviewed. Potential side-effects and risks of medication(s) also explained. Patient and/or caregiver was instructed to call if any new symptoms develop prior to next visit. Health risk factors discussed and addressed.

## 2022-02-23 DIAGNOSIS — J44.9 CHRONIC OBSTRUCTIVE PULMONARY DISEASE, UNSPECIFIED COPD TYPE (HCC): ICD-10-CM

## 2022-02-23 DIAGNOSIS — Z76.0 MEDICATION REFILL: ICD-10-CM

## 2022-03-23 DIAGNOSIS — I10 HYPERTENSION, UNSPECIFIED TYPE: ICD-10-CM

## 2022-03-24 RX ORDER — HYDROCHLOROTHIAZIDE 12.5 MG/1
CAPSULE, GELATIN COATED ORAL
Qty: 30 CAPSULE | Refills: 0 | OUTPATIENT
Start: 2022-03-24

## 2022-03-25 DIAGNOSIS — I10 HYPERTENSION, UNSPECIFIED TYPE: ICD-10-CM

## 2022-03-25 RX ORDER — HYDROCHLOROTHIAZIDE 12.5 MG/1
CAPSULE, GELATIN COATED ORAL
Qty: 30 CAPSULE | Refills: 0 | Status: SHIPPED | OUTPATIENT
Start: 2022-03-25

## 2022-03-25 NOTE — TELEPHONE ENCOUNTER
Last Appointment:  11/16/2021  Future Appointments   Date Time Provider Es Daly   3/31/2022  1:00 PM Doc MD Marni Ferguson [facility]    Call to patient, scheduled appt 3/31/22 1p

## 2022-03-26 ENCOUNTER — HOSPITAL ENCOUNTER (EMERGENCY)
Age: 52
Discharge: HOME | End: 2022-03-26
Attending: EMERGENCY MEDICINE
Payer: COMMERCIAL

## 2022-03-26 ENCOUNTER — APPOINTMENT (OUTPATIENT)
Dept: GENERAL RADIOLOGY | Age: 52
End: 2022-03-26
Payer: COMMERCIAL

## 2022-03-26 VITALS
TEMPERATURE: 97.4 F | WEIGHT: 120 LBS | SYSTOLIC BLOOD PRESSURE: 130 MMHG | RESPIRATION RATE: 16 BRPM | HEIGHT: 61 IN | BODY MASS INDEX: 22.66 KG/M2 | HEART RATE: 89 BPM | OXYGEN SATURATION: 99 % | DIASTOLIC BLOOD PRESSURE: 78 MMHG

## 2022-03-26 DIAGNOSIS — S52.502A CLOSED FRACTURE OF DISTAL END OF LEFT RADIUS, UNSPECIFIED FRACTURE MORPHOLOGY, INITIAL ENCOUNTER: Primary | ICD-10-CM

## 2022-03-26 PROCEDURE — 73090 X-RAY EXAM OF FOREARM: CPT

## 2022-03-26 PROCEDURE — 99283 EMERGENCY DEPT VISIT LOW MDM: CPT

## 2022-03-26 PROCEDURE — 6370000000 HC RX 637 (ALT 250 FOR IP): Performed by: STUDENT IN AN ORGANIZED HEALTH CARE EDUCATION/TRAINING PROGRAM

## 2022-03-26 PROCEDURE — 73110 X-RAY EXAM OF WRIST: CPT

## 2022-03-26 PROCEDURE — 6360000002 HC RX W HCPCS: Performed by: STUDENT IN AN ORGANIZED HEALTH CARE EDUCATION/TRAINING PROGRAM

## 2022-03-26 PROCEDURE — 25605 CLTX DST RDL FX/EPHYS SEP W/: CPT

## 2022-03-26 PROCEDURE — 73070 X-RAY EXAM OF ELBOW: CPT

## 2022-03-26 RX ORDER — HYDROCODONE BITARTRATE AND ACETAMINOPHEN 5; 325 MG/1; MG/1
1 TABLET ORAL EVERY 8 HOURS PRN
Qty: 9 TABLET | Refills: 0 | Status: SHIPPED | OUTPATIENT
Start: 2022-03-26 | End: 2022-03-29

## 2022-03-26 RX ORDER — LIDOCAINE HYDROCHLORIDE 10 MG/ML
5 INJECTION, SOLUTION INFILTRATION; PERINEURAL ONCE
Status: DISCONTINUED | OUTPATIENT
Start: 2022-03-26 | End: 2022-03-26 | Stop reason: HOSPADM

## 2022-03-26 RX ORDER — HYDROCODONE BITARTRATE AND ACETAMINOPHEN 5; 325 MG/1; MG/1
1 TABLET ORAL ONCE
Status: COMPLETED | OUTPATIENT
Start: 2022-03-26 | End: 2022-03-26

## 2022-03-26 RX ORDER — FENTANYL CITRATE 50 UG/ML
50 INJECTION, SOLUTION INTRAMUSCULAR; INTRAVENOUS ONCE
Status: COMPLETED | OUTPATIENT
Start: 2022-03-26 | End: 2022-03-26

## 2022-03-26 RX ORDER — ONDANSETRON 4 MG/1
4 TABLET, ORALLY DISINTEGRATING ORAL EVERY 8 HOURS PRN
Qty: 6 TABLET | Refills: 0 | Status: SHIPPED | OUTPATIENT
Start: 2022-03-26 | End: 2022-03-28

## 2022-03-26 RX ADMIN — FENTANYL CITRATE 50 MCG: 50 INJECTION, SOLUTION INTRAMUSCULAR; INTRAVENOUS at 19:39

## 2022-03-26 RX ADMIN — FENTANYL CITRATE 50 MCG: 50 INJECTION, SOLUTION INTRAMUSCULAR; INTRAVENOUS at 18:34

## 2022-03-26 RX ADMIN — HYDROCODONE BITARTRATE AND ACETAMINOPHEN 1 TABLET: 5; 325 TABLET ORAL at 20:49

## 2022-03-26 ASSESSMENT — PAIN SCALES - GENERAL
PAINLEVEL_OUTOF10: 7
PAINLEVEL_OUTOF10: 7
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
## 2022-03-27 ASSESSMENT — ENCOUNTER SYMPTOMS
ABDOMINAL DISTENTION: 0
COUGH: 0
WHEEZING: 0
BACK PAIN: 0
COLOR CHANGE: 0
EYE PAIN: 0
DIARRHEA: 0
NAUSEA: 0
VOMITING: 0
EYE REDNESS: 0
SHORTNESS OF BREATH: 0
SORE THROAT: 0
SINUS PRESSURE: 0

## 2022-04-20 DIAGNOSIS — J44.9 CHRONIC OBSTRUCTIVE PULMONARY DISEASE, UNSPECIFIED COPD TYPE (HCC): ICD-10-CM

## 2022-04-20 DIAGNOSIS — Z76.0 MEDICATION REFILL: ICD-10-CM

## 2022-04-20 NOTE — TELEPHONE ENCOUNTER
Last Appointment:  11/16/2021  No future appointments. Called patient to schedule f/u appt as requested from last visit. Patient states she has appt with Pulmonology 5/12/22 and will schedule a f/u at [facility] afterwards if needed.

## 2022-05-24 DIAGNOSIS — I10 HYPERTENSION, UNSPECIFIED TYPE: ICD-10-CM

## 2022-05-24 RX ORDER — HYDROCHLOROTHIAZIDE 12.5 MG/1
CAPSULE, GELATIN COATED ORAL
Qty: 30 CAPSULE | Refills: 0 | OUTPATIENT
Start: 2022-05-24

## 2022-06-21 DIAGNOSIS — J44.9 CHRONIC OBSTRUCTIVE PULMONARY DISEASE, UNSPECIFIED COPD TYPE (HCC): ICD-10-CM

## 2022-06-21 DIAGNOSIS — Z76.0 MEDICATION REFILL: ICD-10-CM

## 2022-08-17 DIAGNOSIS — J44.9 CHRONIC OBSTRUCTIVE PULMONARY DISEASE, UNSPECIFIED COPD TYPE (HCC): ICD-10-CM

## 2022-08-17 DIAGNOSIS — Z76.0 MEDICATION REFILL: ICD-10-CM

## 2022-09-15 DIAGNOSIS — J44.9 CHRONIC OBSTRUCTIVE PULMONARY DISEASE, UNSPECIFIED COPD TYPE (HCC): ICD-10-CM

## 2022-09-15 DIAGNOSIS — Z76.0 MEDICATION REFILL: ICD-10-CM

## 2022-09-15 RX ORDER — ALBUTEROL SULFATE 90 UG/1
AEROSOL, METERED RESPIRATORY (INHALATION)
Qty: 18 G | Refills: 0 | Status: SHIPPED | OUTPATIENT
Start: 2022-09-15

## 2022-11-30 ENCOUNTER — TELEPHONE (OUTPATIENT)
Dept: FAMILY MEDICINE CLINIC | Age: 52
End: 2022-11-30

## 2022-11-30 DIAGNOSIS — Z12.31 ENCOUNTER FOR SCREENING MAMMOGRAM FOR MALIGNANT NEOPLASM OF BREAST: Primary | ICD-10-CM

## 2023-03-09 ENCOUNTER — HOSPITAL ENCOUNTER (EMERGENCY)
Age: 53
Discharge: LEFT BEFORE DISCHARGE | End: 2023-03-09

## 2023-03-09 VITALS
SYSTOLIC BLOOD PRESSURE: 157 MMHG | OXYGEN SATURATION: 93 % | BODY MASS INDEX: 20.6 KG/M2 | HEART RATE: 111 BPM | TEMPERATURE: 98.5 F | WEIGHT: 109 LBS | DIASTOLIC BLOOD PRESSURE: 108 MMHG | RESPIRATION RATE: 16 BRPM

## 2023-03-09 ASSESSMENT — PAIN DESCRIPTION - DESCRIPTORS: DESCRIPTORS: ACHING;SHOOTING;DISCOMFORT

## 2023-03-09 ASSESSMENT — PAIN DESCRIPTION - ORIENTATION: ORIENTATION: RIGHT

## 2023-03-09 ASSESSMENT — PAIN - FUNCTIONAL ASSESSMENT: PAIN_FUNCTIONAL_ASSESSMENT: 0-10

## 2023-03-09 ASSESSMENT — PAIN DESCRIPTION - LOCATION: LOCATION: EAR

## 2023-03-09 ASSESSMENT — PAIN SCALES - GENERAL: PAINLEVEL_OUTOF10: 8
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
## 2023-06-21 ENCOUNTER — OFFICE VISIT (OUTPATIENT)
Dept: OBGYN | Age: 53
End: 2023-06-21

## 2023-06-21 VITALS
DIASTOLIC BLOOD PRESSURE: 76 MMHG | HEIGHT: 61 IN | BODY MASS INDEX: 18.5 KG/M2 | WEIGHT: 98 LBS | HEART RATE: 80 BPM | SYSTOLIC BLOOD PRESSURE: 145 MMHG

## 2023-06-21 DIAGNOSIS — R63.4 UNEXPLAINED WEIGHT LOSS: ICD-10-CM

## 2023-06-21 DIAGNOSIS — Z01.419 WELL WOMAN EXAM WITH ROUTINE GYNECOLOGICAL EXAM: Primary | ICD-10-CM

## 2023-06-21 DIAGNOSIS — Z12.4 PAP SMEAR FOR CERVICAL CANCER SCREENING: ICD-10-CM

## 2023-06-21 DIAGNOSIS — Z12.31 ENCOUNTER FOR SCREENING MAMMOGRAM FOR BREAST CANCER: ICD-10-CM

## 2023-06-21 LAB
ALBUMIN SERPL-MCNC: 4.6 G/DL (ref 3.5–5.2)
ALP SERPL-CCNC: 70 U/L (ref 35–104)
ALT SERPL-CCNC: 16 U/L (ref 0–32)
ANION GAP SERPL CALCULATED.3IONS-SCNC: 9 MMOL/L (ref 7–16)
AST SERPL-CCNC: 17 U/L (ref 0–31)
BASOPHILS # BLD: 0.03 E9/L (ref 0–0.2)
BASOPHILS NFR BLD: 0.7 % (ref 0–2)
BILIRUB SERPL-MCNC: 0.3 MG/DL (ref 0–1.2)
BILIRUBIN, POC: NEGATIVE
BLOOD URINE, POC: NEGATIVE
BUN SERPL-MCNC: 13 MG/DL (ref 6–20)
CALCIUM SERPL-MCNC: 10 MG/DL (ref 8.6–10.2)
CHLORIDE SERPL-SCNC: 102 MMOL/L (ref 98–107)
CLARITY, POC: CLEAR
CO2 SERPL-SCNC: 31 MMOL/L (ref 22–29)
COLOR, POC: YELLOW
CREAT SERPL-MCNC: 0.6 MG/DL (ref 0.5–1)
EOSINOPHIL # BLD: 0.06 E9/L (ref 0.05–0.5)
EOSINOPHIL NFR BLD: 1.3 % (ref 0–6)
ERYTHROCYTE [DISTWIDTH] IN BLOOD BY AUTOMATED COUNT: 12.7 FL (ref 11.5–15)
GLUCOSE SERPL-MCNC: 87 MG/DL (ref 74–99)
GLUCOSE URINE, POC: NEGATIVE
HCT VFR BLD AUTO: 42.3 % (ref 34–48)
HGB BLD-MCNC: 13 G/DL (ref 11.5–15.5)
IMM GRANULOCYTES # BLD: 0.01 E9/L
IMM GRANULOCYTES NFR BLD: 0.2 % (ref 0–5)
KETONES, POC: NEGATIVE
LEUKOCYTE EST, POC: NEGATIVE
LYMPHOCYTES # BLD: 0.79 E9/L (ref 1.5–4)
LYMPHOCYTES NFR BLD: 17.8 % (ref 20–42)
MCH RBC QN AUTO: 31.1 PG (ref 26–35)
MCHC RBC AUTO-ENTMCNC: 30.7 % (ref 32–34.5)
MCV RBC AUTO: 101.2 FL (ref 80–99.9)
MONOCYTES # BLD: 0.34 E9/L (ref 0.1–0.95)
MONOCYTES NFR BLD: 7.6 % (ref 2–12)
NEUTROPHILS # BLD: 3.22 E9/L (ref 1.8–7.3)
NEUTS SEG NFR BLD: 72.4 % (ref 43–80)
NITRITE, POC: NEGATIVE
PH, POC: 5
PLATELET # BLD AUTO: 200 E9/L (ref 130–450)
PMV BLD AUTO: 10.2 FL (ref 7–12)
POTASSIUM SERPL-SCNC: 3.7 MMOL/L (ref 3.5–5)
PROT SERPL-MCNC: 6.6 G/DL (ref 6.4–8.3)
PROTEIN, POC: NEGATIVE
RBC # BLD AUTO: 4.18 E12/L (ref 3.5–5.5)
SODIUM SERPL-SCNC: 142 MMOL/L (ref 132–146)
SPECIFIC GRAVITY, POC: 1.01
UROBILINOGEN, POC: 0.2
WBC # BLD: 4.5 E9/L (ref 4.5–11.5)
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
## 2023-06-21 NOTE — PROGRESS NOTES
Patient alert and pleasant with complaints of lower abd pain. Going on for 2 weeks. Pain is a nine on the pain scale per patient. Here today for annual GYN exam.  Pelvic exam, pap smear obtained, labeled  and delivered to lab. Breast exam completed by doctor. Discharge instructions have been discussed with the patient. Patient advised to call our office with any questions or concerns. Voiced understanding.

## 2023-06-24 LAB
HPV HR 12 DNA SPEC QL NAA+PROBE: NOT DETECTED
HPV SAMPLE: NORMAL
HPV16 DNA SPEC QL NAA+PROBE: NOT DETECTED
HPV16+18+H RISK 12 DNA CVX-IMP: NORMAL
HPV18 DNA SPEC QL NAA+PROBE: NOT DETECTED
SPECIMEN SOURCE: NORMAL

## 2023-07-01 NOTE — PROGRESS NOTES
URGENT CARE DEPARTMENT ENCOUNTER      CHIEF COMPLAINT    Chief Complaint   Patient presents with   • Sore Throat     ?   • Rash       HISTORY OF PRESENT ILLNESS    Aurelio Olivo is a pleasant 19 month old male who presents to the [facility] with mother for evaluation and treatment of rash and fever.  Patient was evaluated 2 days ago for possible strep, was negative at that time.  And rash started yesterday.  Patient had a low-grade temp.  Lack of appetite, fatigue, poor sleeping.  Has been taking Tylenol for symptoms.    ALLERGIES    ALLERGIES:  No Known Allergies    CURRENT MEDICATIONS    Current Outpatient Medications   Medication Sig Dispense Refill   • amoxicillin (AMOXIL) 400 MG/5ML suspension Take 3.3 mLs by mouth in the morning and 3.3 mLs in the evening. Do all this for 10 days. Discard remainder 75 mL 0     No current facility-administered medications for this visit.       PAST MEDICAL HISTORY    Past Medical History:   Diagnosis Date   • Hypoglycemia,  2021       SURGICAL HISTORY    History reviewed. No pertinent surgical history.    SOCIAL HISTORY         FAMILY HISTORY    History reviewed. No pertinent family history.    REVIEW OF SYSTEMS    Constitutional:  fatigue, fever Denies unusual weight gain, recent weight change, weakness,, chills  Eyes:  Denies visual changes, redness, excessive tearing, double vision  HENT:  See HPI  Respiratory:  Denies cough, sputum, hemoptysis, wheezing, asthma, bronchitis, emphysema, pneumonia, tuberculosis.    Cardiovascular:  Denies heart trouble, hypertension, rheumatic fever, murmur, dyspnea, orthopnea, edema, chest pain, palpitations  GI:  Denies abdominal pain, nausea, vomiting, diarrhea, constipation, blood in bowel movement, hemorrhoids, rectal pain, and dyspepsia.   :  Denies dysuria, polyuria, nocturia, hematuria, urgency, incontinence, infections, stones.    Musculoskeletal:  Denies joint pains, stiffness,  POC explained to patient and discharge instructions given to patient by Dr. Kem Grant. Patient verbalized understanding. Pt scheduled For [facility] on April 23, 19 at  0730. Scheduled with Rhonda arthritis, gout, back pain, neck pain, myalgias.    Integument:  Denies rashes, lumps, itching, dryness, color changes, hair changes  Neurologic:  Denies fainting, blackouts, seizures, weakness, numbness, tingling, tremors, memory loss.        PHYSICAL EXAM    Vitals:    07/01/23 1119   Pulse: 108   Resp: 28   Temp: 98.3 °F (36.8 °C)   TempSrc: Tympanic   SpO2: 98%   Weight: 11.8 kg (26 lb)         Constitutional:  Well developed, well nourished. No acute distress, non-toxic appearance.   Eyes:  Sclera anicteric. PERRL  HENT:  Head is atraumatic. Mucus membranes are moist.  Tympanic membranes clear flat bilaterally.  Neck:  Neck is supple. Full passive range of motion.  Positive cervical lymphadenopathy  Respiratory:  No respiratory distress. Lungs are clear to auscultation bilaterally. No rales or wheezing appreciated. No stridor.    Integument:  Diffuse erythemic rash on face, arms    UC COURSE & MEDICAL DECISION MAKING    19 month old male who presents with rash, fever, strep exposure   DDX: Includes consideration for causes such as; allergic rhinitis, asthma, bronchitis,epiglottitis, influenza, laryngitis, mononucleosis, otitis media, pertussis, pharyngitis, pneumonia, sinusitis.  H&P consistent with strep    IMPRESSION  Diagnosis:  The encounter diagnosis was Strep pharyngitis.    PLAN  Orders Placed This Encounter   • amoxicillin (AMOXIL) 400 MG/5ML suspension   1.  Tylenol/ibuprofen for comfort  2. Treatment plan to include conservative supportive measures  3. All questions and concerns addressed.  4. Return precautions discussed.  Follow-up with family practice in the next few days, if sx persist or ED/UC if sx worsen.   5. Patient discharged in stable and improved condition.      Davi Pope PA-C, [facility]    Supervising physician is Dr. Elam.   Consulting physician for services performed today is .

## 2023-07-12 ENCOUNTER — HOSPITAL ENCOUNTER (OUTPATIENT)
Dept: CT IMAGING | Age: 53
Discharge: HOME | End: 2023-07-14
Attending: OBSTETRICS & GYNECOLOGY
Payer: COMMERCIAL

## 2023-07-12 DIAGNOSIS — R63.4 UNEXPLAINED WEIGHT LOSS: ICD-10-CM

## 2023-07-12 PROCEDURE — 6360000004 HC RX CONTRAST MEDICATION: Performed by: RADIOLOGY

## 2023-07-12 PROCEDURE — 74178 CT ABD&PLV WO CNTR FLWD CNTR: CPT

## 2023-07-12 RX ADMIN — IOPAMIDOL 18 ML: 755 INJECTION, SOLUTION INTRAVENOUS at 12:53

## 2023-07-20 ENCOUNTER — OFFICE VISIT (OUTPATIENT)
Dept: OBGYN | Age: 53
End: 2023-07-20
Payer: COMMERCIAL

## 2023-07-20 VITALS — SYSTOLIC BLOOD PRESSURE: 169 MMHG | HEART RATE: 72 BPM | DIASTOLIC BLOOD PRESSURE: 95 MMHG

## 2023-07-20 DIAGNOSIS — R93.3 ABNORMAL CT SCAN, COLON: ICD-10-CM

## 2023-07-20 DIAGNOSIS — R63.4 UNEXPLAINED WEIGHT LOSS: Primary | ICD-10-CM

## 2023-07-20 PROCEDURE — 4004F PT TOBACCO SCREEN RCVD TLK: CPT | Performed by: OBSTETRICS & GYNECOLOGY

## 2023-07-20 PROCEDURE — G8420 CALC BMI NORM PARAMETERS: HCPCS | Performed by: OBSTETRICS & GYNECOLOGY

## 2023-07-20 PROCEDURE — G8427 DOCREV CUR MEDS BY ELIG CLIN: HCPCS | Performed by: OBSTETRICS & GYNECOLOGY

## 2023-07-20 PROCEDURE — 99212 OFFICE O/P EST SF 10 MIN: CPT | Performed by: OBSTETRICS & GYNECOLOGY

## 2023-07-20 PROCEDURE — 3017F COLORECTAL CA SCREEN DOC REV: CPT | Performed by: OBSTETRICS & GYNECOLOGY

## 2023-07-20 NOTE — PROGRESS NOTES
Patient alert and pleasant with no complaints  Here today for CT and lab results. Provider notified of elevated BP, patient in some pain. Discharge instructions have been discussed with the patient. Patient advised to call our office with any questions or concerns. Voiced understanding.

## 2023-07-20 NOTE — PROGRESS NOTES
Back today to review her CT Scan done for weight loss. Basically the pelvis is clear. They did see a lesion in the sigmoid colon which needs evaluated. She needs a colonoscopy done so will refer to General surgery for that abut she must also see her PCP for further workup. I do not find any current GYN pathology that needs further evaluation. Did have some ASCUS changes on her pap but the HPV was negative and I would recommend a repeat pap and HPV in 1 year. Pat informed. Will place consult to Gen Surgery, urgent for colonoscopy. Also needs back with her PCP for further evaluation of the weight loss. Patient voices understanding and will follow these recommendations. I would see her PRN but 1 year for a repap and HPV>

## 2023-09-01 ENCOUNTER — OFFICE VISIT (OUTPATIENT)
Dept: FAMILY MEDICINE CLINIC | Age: 53
End: 2023-09-01

## 2023-09-01 VITALS
OXYGEN SATURATION: 93 % | WEIGHT: 97 LBS | SYSTOLIC BLOOD PRESSURE: 150 MMHG | HEIGHT: 61 IN | RESPIRATION RATE: 18 BRPM | HEART RATE: 99 BPM | TEMPERATURE: 100.9 F | DIASTOLIC BLOOD PRESSURE: 94 MMHG | BODY MASS INDEX: 18.31 KG/M2

## 2023-09-01 DIAGNOSIS — M79.672 LEFT FOOT PAIN: ICD-10-CM

## 2023-09-01 DIAGNOSIS — Z76.0 MEDICATION REFILL: ICD-10-CM

## 2023-09-01 DIAGNOSIS — I10 PRIMARY HYPERTENSION: ICD-10-CM

## 2023-09-01 DIAGNOSIS — Z72.0 TOBACCO ABUSE: ICD-10-CM

## 2023-09-01 DIAGNOSIS — J44.9 CHRONIC OBSTRUCTIVE PULMONARY DISEASE, UNSPECIFIED COPD TYPE (HCC): ICD-10-CM

## 2023-09-01 DIAGNOSIS — R50.9 FEVER, UNSPECIFIED FEVER CAUSE: Primary | ICD-10-CM

## 2023-09-01 LAB
ABSOLUTE IMMATURE GRANULOCYTE: <0.03 K/UL (ref 0–0.58)
BASOPHILS ABSOLUTE: 0.02 K/UL (ref 0–0.2)
BASOPHILS RELATIVE PERCENT: 0 % (ref 0–2)
C-REACTIVE PROTEIN: <3 MG/L (ref 0–5)
EOSINOPHILS ABSOLUTE: 0.03 K/UL (ref 0.05–0.5)
EOSINOPHILS RELATIVE PERCENT: 1 % (ref 0–6)
HCT VFR BLD CALC: 44.9 % (ref 34–48)
HEMOGLOBIN: 14.3 G/DL (ref 11.5–15.5)
IMMATURE GRANULOCYTES: 0 % (ref 0–5)
INFLUENZA VIRUS A RNA: NEGATIVE
INFLUENZA VIRUS B RNA: NEGATIVE
LYMPHOCYTES ABSOLUTE: 1.02 K/UL (ref 1.5–4)
LYMPHOCYTES RELATIVE PERCENT: 20 % (ref 20–42)
Lab: NORMAL
MCH RBC QN AUTO: 31.3 PG (ref 26–35)
MCHC RBC AUTO-ENTMCNC: 31.8 G/DL (ref 32–34.5)
MCV RBC AUTO: 98.2 FL (ref 80–99.9)
MONOCYTES ABSOLUTE: 0.29 K/UL (ref 0.1–0.95)
MONOCYTES RELATIVE PERCENT: 6 % (ref 2–12)
NEUTROPHILS ABSOLUTE: 3.78 K/UL (ref 1.8–7.3)
NEUTROPHILS RELATIVE PERCENT: 73 % (ref 43–80)
PDW BLD-RTO: 12.4 % (ref 11.5–15)
PLATELET # BLD: 231 K/UL (ref 130–450)
PMV BLD AUTO: 10.4 FL (ref 7–12)
QC PASS/FAIL: NORMAL
RBC # BLD: 4.57 M/UL (ref 3.5–5.5)
SARS-COV-2 RDRP RESP QL NAA+PROBE: NEGATIVE
WBC # BLD: 5.2 K/UL (ref 4.5–11.5)

## 2023-09-01 RX ORDER — HYDROCHLOROTHIAZIDE 12.5 MG/1
12.5 CAPSULE, GELATIN COATED ORAL DAILY
Qty: 30 CAPSULE | Refills: 0 | Status: SHIPPED | OUTPATIENT
Start: 2023-09-01

## 2023-09-01 RX ORDER — BUDESONIDE 0.25 MG/2ML
1 INHALANT ORAL 2 TIMES DAILY
Qty: 60 EACH | Refills: 3 | Status: CANCELLED | OUTPATIENT
Start: 2023-09-01

## 2023-09-01 RX ORDER — IPRATROPIUM BROMIDE AND ALBUTEROL SULFATE 2.5; .5 MG/3ML; MG/3ML
1 SOLUTION RESPIRATORY (INHALATION) EVERY 4 HOURS PRN
Qty: 360 ML | Refills: 3 | Status: SHIPPED | OUTPATIENT
Start: 2023-09-01

## 2023-09-01 RX ORDER — ALBUTEROL SULFATE 90 UG/1
AEROSOL, METERED RESPIRATORY (INHALATION)
Qty: 18 G | Refills: 0 | Status: SHIPPED | OUTPATIENT
Start: 2023-09-01

## 2023-09-01 SDOH — ECONOMIC STABILITY: FOOD INSECURITY: WITHIN THE PAST 12 MONTHS, YOU WORRIED THAT YOUR FOOD WOULD RUN OUT BEFORE YOU GOT MONEY TO BUY MORE.: OFTEN TRUE

## 2023-09-01 SDOH — ECONOMIC STABILITY: FOOD INSECURITY: WITHIN THE PAST 12 MONTHS, THE FOOD YOU BOUGHT JUST DIDN'T LAST AND YOU DIDN'T HAVE MONEY TO GET MORE.: OFTEN TRUE

## 2023-09-01 SDOH — ECONOMIC STABILITY: HOUSING INSECURITY
IN THE LAST 12 MONTHS, WAS THERE A TIME WHEN YOU DID NOT HAVE A STEADY PLACE TO SLEEP OR SLEPT IN A SHELTER (INCLUDING NOW)?: NO

## 2023-09-01 SDOH — ECONOMIC STABILITY: INCOME INSECURITY: HOW HARD IS IT FOR YOU TO PAY FOR THE VERY BASICS LIKE FOOD, HOUSING, MEDICAL CARE, AND HEATING?: NOT HARD AT ALL

## 2023-09-01 ASSESSMENT — PATIENT HEALTH QUESTIONNAIRE - PHQ9
SUM OF ALL RESPONSES TO PHQ QUESTIONS 1-9: 2
SUM OF ALL RESPONSES TO PHQ9 QUESTIONS 1 & 2: 2
SUM OF ALL RESPONSES TO PHQ QUESTIONS 1-9: 2
2. FEELING DOWN, DEPRESSED OR HOPELESS: 2
1. LITTLE INTEREST OR PLEASURE IN DOING THINGS: 0

## 2023-09-01 NOTE — PROGRESS NOTES
[address] [place]  FAMILY MEDICINE RESIDENCY PROGRAM  DATE OF VISIT : 2023    Patient : Vijaya Iglesias   Age : 45 y.o.  : 1970   MRN : [id number]   ______________________________________________________________________    Chief Complaint :   Chief Complaint   Patient presents with    Colonoscopy     Referral      Asthma    Medication Refill       HPI : Vijaya Iglesias is 45 y.o. female who presented to the clinic today for medication refill. PMH of COPD. Endorses depressed mood. Lost mother in law in  and fiance . Has not been on meds. Has lost weight due to lack of appetite. but has been eating more. Denies SI/HI. PHQ-2: 2 in office today. Followed with emre counseling. COPD- not taking spiriva or albuterol. Smoking for almost 40 years, 36 ppy. Down to half pack a day. Has a fever in office today 100.9. endorses congestion. Runny nose. Ongoing x several days. Denies sore throat, ear pain, cp. Feels chronically short of breath, but not worse than usual.   Had bunion removed 2 years ago. Feeling red. No drainage from foot. More painful in the past week. Feels like rods are shifting. Follows with Podiatry, Dr. Barrios Figures    Last colonoscopy     Patient's medications, allergies, past medical, surgical, social and family histories were reviewed and updated as appropriate.     Past Medical History :  Past Medical History:   Diagnosis Date    Anxiety and depression     Asthma     controlled     Chronic pelvic pain in female     COPD (chronic obstructive pulmonary disease) ([address])     controlled with inhalers     Left hip pain     8/10 severity    Left leg pain     8/10 severity    Neuropathy     Osteoarthritis     Post-menopausal bleeding     Thickened endometrium     Tobacco abuse      Past Surgical History:   Procedure Laterality Date    BIOPSY MOUTH LESION N/A 2020    BIOPSY DORSAL TONGUE performed by Esthela Brown MD at [facility]

## 2023-09-02 LAB — SEDIMENTATION RATE, ERYTHROCYTE: 1 MM/HR (ref 0–20)

## 2023-09-07 ENCOUNTER — TELEPHONE (OUTPATIENT)
Dept: FAMILY MEDICINE CLINIC | Age: 53
End: 2023-09-07

## 2023-09-07 NOTE — TELEPHONE ENCOUNTER
Jourdan Cruz called in and is asking if you could please order her some ensure or boost nutrition shakes, they would have to go through a [employer] company. She feels they may help her gain some weight.     Please advise    Thank you

## 2023-09-12 ENCOUNTER — HOSPITAL ENCOUNTER (OUTPATIENT)
Age: 53
Discharge: HOME | End: 2023-09-14

## 2023-09-15 ENCOUNTER — TELEPHONE (OUTPATIENT)
Dept: CASE MANAGEMENT | Age: 53
End: 2023-09-15

## 2023-09-15 NOTE — TELEPHONE ENCOUNTER
I called the patient and she confirmed her CT lung screening at [facility] on 9/18/2023 at 3:30 pm.  I reminded the patient to arrive at 3:00 pm, enter through the main entrance, and register. Patient confirmed.               Electronically signed by Bushra Glass on 9/15/23 at 2:46 PM EDT

## 2023-09-18 ENCOUNTER — HOSPITAL ENCOUNTER (OUTPATIENT)
Dept: CT IMAGING | Age: 53
Discharge: HOME | End: 2023-09-20
Payer: COMMERCIAL

## 2023-09-18 ENCOUNTER — HOSPITAL ENCOUNTER (OUTPATIENT)
Dept: GENERAL RADIOLOGY | Age: 53
Discharge: HOME | End: 2023-09-20
Payer: COMMERCIAL

## 2023-09-18 ENCOUNTER — HOSPITAL ENCOUNTER (OUTPATIENT)
Age: 53
Discharge: HOME | End: 2023-09-20
Payer: COMMERCIAL

## 2023-09-18 DIAGNOSIS — M79.672 LEFT FOOT PAIN: ICD-10-CM

## 2023-09-18 DIAGNOSIS — Z72.0 TOBACCO ABUSE: ICD-10-CM

## 2023-09-18 PROCEDURE — 73630 X-RAY EXAM OF FOOT: CPT

## 2023-09-18 PROCEDURE — 71271 CT THORAX LUNG CANCER SCR C-: CPT

## 2023-09-19 ENCOUNTER — OFFICE VISIT (OUTPATIENT)
Dept: FAMILY MEDICINE CLINIC | Age: 53
End: 2023-09-19
Payer: COMMERCIAL

## 2023-09-19 ENCOUNTER — TELEPHONE (OUTPATIENT)
Dept: CASE MANAGEMENT | Age: 53
End: 2023-09-19

## 2023-09-19 VITALS
WEIGHT: 100 LBS | HEIGHT: 61 IN | RESPIRATION RATE: 16 BRPM | OXYGEN SATURATION: 91 % | DIASTOLIC BLOOD PRESSURE: 87 MMHG | BODY MASS INDEX: 18.88 KG/M2 | TEMPERATURE: 98.1 F | SYSTOLIC BLOOD PRESSURE: 139 MMHG | HEART RATE: 75 BPM

## 2023-09-19 DIAGNOSIS — Z23 NEED FOR INFLUENZA VACCINATION: ICD-10-CM

## 2023-09-19 DIAGNOSIS — Z01.818 PREOP EXAMINATION: Primary | ICD-10-CM

## 2023-09-19 PROCEDURE — 90471 IMMUNIZATION ADMIN: CPT | Performed by: FAMILY MEDICINE

## 2023-09-19 PROCEDURE — G8420 CALC BMI NORM PARAMETERS: HCPCS | Performed by: STUDENT IN AN ORGANIZED HEALTH CARE EDUCATION/TRAINING PROGRAM

## 2023-09-19 PROCEDURE — G8427 DOCREV CUR MEDS BY ELIG CLIN: HCPCS | Performed by: STUDENT IN AN ORGANIZED HEALTH CARE EDUCATION/TRAINING PROGRAM

## 2023-09-19 PROCEDURE — 90674 CCIIV4 VAC NO PRSV 0.5 ML IM: CPT | Performed by: FAMILY MEDICINE

## 2023-09-19 PROCEDURE — 99213 OFFICE O/P EST LOW 20 MIN: CPT | Performed by: STUDENT IN AN ORGANIZED HEALTH CARE EDUCATION/TRAINING PROGRAM

## 2023-09-19 ASSESSMENT — LIFESTYLE VARIABLES
HOW OFTEN DO YOU HAVE A DRINK CONTAINING ALCOHOL: MONTHLY OR LESS
HOW MANY STANDARD DRINKS CONTAINING ALCOHOL DO YOU HAVE ON A TYPICAL DAY: 1 OR 2

## 2023-09-19 NOTE — TELEPHONE ENCOUNTER
No call, encounter opened to process CT Lung Screening. CT Lung Screen: 9/18/2023    IMPRESSION:  1. There is no pulmonary infiltrate, mass or suspicious pulmonary nodule  2. Emphysematous changes  3. Benign calcified granulomatous disease  4. Stable benign noncalcified granuloma seen within the left upper lobe  laterally measuring 6.6 mm. LUNG RADS:  Lung-RADS 2 - Benign ()     Management:  12 month screening LDCT     RECOMMENDATIONS:  If you would like to register your patient with the [place], please contact the Nurse Navigator at  [phone]. Pack years: 16    Social History     Tobacco Use  Smoking Status: Current Every Day Smoker    Start Date: 1982   Quit Date:    Types: Cigarettes   Packs/Day: 0.5   Years: 29   Pack Years: 16   Smokeless Tobacco: Never         Results letter sent to patient via my chart or mailed.      [address]

## 2023-09-24 DIAGNOSIS — I10 PRIMARY HYPERTENSION: ICD-10-CM

## 2023-09-25 RX ORDER — HYDROCHLOROTHIAZIDE 12.5 MG/1
12.5 CAPSULE, GELATIN COATED ORAL DAILY
Qty: 30 CAPSULE | Refills: 2 | Status: SHIPPED
Start: 2023-09-25 | End: 2023-09-29

## 2023-09-25 NOTE — TELEPHONE ENCOUNTER
Last Appointment:  9/19/2023  Future Appointments  10/3/2023  2:00 PM    Anastasiya Calderon MD           [facility]        [facility]

## 2023-09-29 DIAGNOSIS — I10 PRIMARY HYPERTENSION: ICD-10-CM

## 2023-09-29 DIAGNOSIS — J44.9 CHRONIC OBSTRUCTIVE PULMONARY DISEASE, UNSPECIFIED COPD TYPE (HCC): ICD-10-CM

## 2023-09-29 DIAGNOSIS — Z76.0 MEDICATION REFILL: ICD-10-CM

## 2023-09-29 RX ORDER — ALBUTEROL SULFATE 90 UG/1
AEROSOL, METERED RESPIRATORY (INHALATION)
Qty: 8.5 G | Refills: 3 | Status: SHIPPED | OUTPATIENT
Start: 2023-09-29

## 2023-09-29 RX ORDER — HYDROCHLOROTHIAZIDE 12.5 MG/1
12.5 CAPSULE, GELATIN COATED ORAL DAILY
Qty: 30 CAPSULE | Refills: 2 | Status: SHIPPED | OUTPATIENT
Start: 2023-09-29

## 2023-09-29 NOTE — TELEPHONE ENCOUNTER
Last Appointment:  9/19/2023  Future Appointments   Date Time Provider [address]   10/3/2023  2:00 PM MD Alex Rosario [facility]

## 2023-09-30 DIAGNOSIS — J44.9 CHRONIC OBSTRUCTIVE PULMONARY DISEASE, UNSPECIFIED COPD TYPE (HCC): ICD-10-CM

## 2023-09-30 DIAGNOSIS — Z76.0 MEDICATION REFILL: ICD-10-CM

## 2023-10-02 NOTE — TELEPHONE ENCOUNTER
Last Appointment:  9/19/2023  Future Appointments  10/3/2023  2:00 PM    MD Alex Rosario [facility]        [facility]

## 2024-01-05 ENCOUNTER — OFFICE VISIT (OUTPATIENT)
Dept: FAMILY MEDICINE CLINIC | Age: 54
End: 2024-01-05
Payer: COMMERCIAL

## 2024-01-05 VITALS
DIASTOLIC BLOOD PRESSURE: 87 MMHG | HEIGHT: 61 IN | OXYGEN SATURATION: 98 % | TEMPERATURE: 97.7 F | WEIGHT: 97.4 LBS | SYSTOLIC BLOOD PRESSURE: 165 MMHG | BODY MASS INDEX: 18.39 KG/M2 | HEART RATE: 86 BPM

## 2024-01-05 DIAGNOSIS — Z12.31 ENCOUNTER FOR SCREENING MAMMOGRAM FOR BREAST CANCER: ICD-10-CM

## 2024-01-05 DIAGNOSIS — Z76.0 MEDICATION REFILL: ICD-10-CM

## 2024-01-05 DIAGNOSIS — R73.9 HYPERGLYCEMIA: Primary | ICD-10-CM

## 2024-01-05 DIAGNOSIS — Z12.11 SCREENING FOR COLON CANCER: ICD-10-CM

## 2024-01-05 DIAGNOSIS — J44.9 CHRONIC OBSTRUCTIVE PULMONARY DISEASE, UNSPECIFIED COPD TYPE (HCC): ICD-10-CM

## 2024-01-05 DIAGNOSIS — Z23 NEED FOR VACCINATION: ICD-10-CM

## 2024-01-05 PROBLEM — F17.200 TOBACCO DEPENDENCE SYNDROME: Status: ACTIVE | Noted: 2024-01-05

## 2024-01-05 PROBLEM — J96.92 HYPERCAPNIC RESPIRATORY FAILURE (HCC): Status: RESOLVED | Noted: 2021-09-26 | Resolved: 2024-01-05

## 2024-01-05 PROCEDURE — 3017F COLORECTAL CA SCREEN DOC REV: CPT | Performed by: FAMILY MEDICINE

## 2024-01-05 PROCEDURE — 4004F PT TOBACCO SCREEN RCVD TLK: CPT | Performed by: FAMILY MEDICINE

## 2024-01-05 PROCEDURE — 99213 OFFICE O/P EST LOW 20 MIN: CPT | Performed by: STUDENT IN AN ORGANIZED HEALTH CARE EDUCATION/TRAINING PROGRAM

## 2024-01-05 PROCEDURE — G8427 DOCREV CUR MEDS BY ELIG CLIN: HCPCS | Performed by: FAMILY MEDICINE

## 2024-01-05 PROCEDURE — G8482 FLU IMMUNIZE ORDER/ADMIN: HCPCS | Performed by: FAMILY MEDICINE

## 2024-01-05 PROCEDURE — 3023F SPIROM DOC REV: CPT | Performed by: FAMILY MEDICINE

## 2024-01-05 PROCEDURE — G8419 CALC BMI OUT NRM PARAM NOF/U: HCPCS | Performed by: FAMILY MEDICINE

## 2024-01-05 RX ORDER — VITAMIN B COMPLEX
1 CAPSULE ORAL DAILY
COMMUNITY

## 2024-01-05 RX ORDER — HYDROCODONE BITARTRATE AND ACETAMINOPHEN 5; 325 MG/1; MG/1
TABLET ORAL
COMMUNITY
Start: 2023-09-28

## 2024-01-05 RX ORDER — METHYLPREDNISOLONE 4 MG/1
TABLET ORAL
COMMUNITY
Start: 2023-11-14

## 2024-01-05 RX ORDER — ALBUTEROL SULFATE 90 UG/1
AEROSOL, METERED RESPIRATORY (INHALATION)
Qty: 8.5 G | Refills: 3 | Status: SHIPPED | OUTPATIENT
Start: 2024-01-05

## 2024-01-05 ASSESSMENT — PATIENT HEALTH QUESTIONNAIRE - PHQ9
SUM OF ALL RESPONSES TO PHQ QUESTIONS 1-9: 2
SUM OF ALL RESPONSES TO PHQ QUESTIONS 1-9: 2
SUM OF ALL RESPONSES TO PHQ9 QUESTIONS 1 & 2: 2
SUM OF ALL RESPONSES TO PHQ QUESTIONS 1-9: 2
1. LITTLE INTEREST OR PLEASURE IN DOING THINGS: 1
2. FEELING DOWN, DEPRESSED OR HOPELESS: 1
SUM OF ALL RESPONSES TO PHQ QUESTIONS 1-9: 2

## 2024-01-05 NOTE — PROGRESS NOTES
Attending Physician Statement    S:   Chief Complaint   Patient presents with    COPD     Patient is here for follow up she states her podiatrist wants her tested for DM.       Here for copd follow up . She takes spiriva, duonebs. One to two times a week. Albuterol daily.     She has nagging cough. No fevers. Has some congestion. Saline nasal spray. Does not like flonase.     Friend recently sick with similar symptoms.       O: Blood pressure (!) 165/87, pulse 86, temperature 97.7 °F (36.5 °C), temperature source Temporal, height 1.549 m (5' 0.98\"), weight 44.2 kg (97 lb 6.4 oz), last menstrual period 12/14/2017, SpO2 98 %, not currently breastfeeding.   Exam:   Heart - RRR   Lungs - clear     A: Copd , viral uri  P:  Mammogram    Check labs   Supportive care.    Follow-up as ordered    Attending Attestation   I have discussed the case, including pertinent history and exam findings with the resident. I agree with the documented assessment and plan.

## 2024-01-05 NOTE — PROGRESS NOTES
[facility]  FAMILY MEDICINE RESIDENCY PROGRAM  DATE OF VISIT : 2024    Patient : En Perry   Age : 53 y.o.    : 1970   MRN : [id number]   ______________________________________________________________________    Chief Complaint :   Chief Complaint   Patient presents with    COPD     Patient is here for follow up she states her podiatrist wants her tested for DM.        HPI : En Perry is 53 y.o. female who presented to the clinic today for COPD follow up. on spiriva, albuterol inhaler twice a day and albuterol neb PRN (1-2x per week). Continues to smoke half pack a day.  Endorses cough, congestion x 2  weeks. Worse at night. Does not like to use flonase bc it dried her up too quickly. Using saline nasal spray. Was coughing up clear phlegm but now dry cough. denies fever, cp, sob, wheezing. Friend was sick with similar sx. LDCT showed emphysematous changes, benign calcified granulomatous disease.    Her podiatrist also wants her to be tested for DM. Family history of MGM, mother had DM and sister had gestational DM.       Patient's medications, allergies, past medical, surgical, social and family histories were reviewed and updated as appropriate.    Past Medical History :  Past Medical History:   Diagnosis Date    Anxiety and depression     Asthma     controlled     Chronic pelvic pain in female     COPD (chronic obstructive pulmonary disease) (HCC)     controlled with inhalers     Left hip pain     8/10 severity    Left leg pain     8/10 severity    Neuropathy     Osteoarthritis     Post-menopausal bleeding     Thickened endometrium     Tobacco abuse      Past Surgical History:   Procedure Laterality Date    BIOPSY MOUTH LESION N/A 2020    BIOPSY DORSAL TONGUE performed by Leon Marie Jr., MD at [facility] OR    BLADDER REPAIR      BUNIONECTOMY      COLONOSCOPY  2019    diverticula--omari    COLONOSCOPY N/A 2019    COLORECTAL CANCER SCREENING,

## 2024-01-10 ENCOUNTER — TELEPHONE (OUTPATIENT)
Dept: SURGERY | Age: 54
End: 2024-01-10

## 2024-01-10 NOTE — TELEPHONE ENCOUNTER
Third attempt, left messages three times. Unable to contact patient. We would be more than happy to schedule this patient with one of our Providers when they call us back. At this time we are forwarding the referral back to referring provider to inform you that we were unable to schedule the patient.   Thanks for the referral.  Any questions or concerns call [phone].    Electronically signed by Anna Kamara MA on 1/10/2024 at 10:11 AM

## 2024-02-13 DIAGNOSIS — I10 PRIMARY HYPERTENSION: ICD-10-CM

## 2024-02-13 NOTE — TELEPHONE ENCOUNTER
Last Appointment:  1/5/2024  Future Appointments   Date Time Provider Department Center   2/26/2024 12:45 PM [facility] SANCHEZ TONNY [facility] 1 [facility]   3/1/2024  9:20 AM Smiley Wagner MD [facility] [place]

## 2024-02-15 RX ORDER — HYDROCHLOROTHIAZIDE 12.5 MG/1
12.5 CAPSULE, GELATIN COATED ORAL DAILY
Qty: 30 CAPSULE | Refills: 0 | Status: SHIPPED | OUTPATIENT
Start: 2024-02-15

## 2024-07-15 ENCOUNTER — HOSPITAL ENCOUNTER (OUTPATIENT)
Dept: GENERAL RADIOLOGY | Age: 54
Discharge: HOME | End: 2024-07-17
Payer: COMMERCIAL

## 2024-07-15 VITALS — WEIGHT: 110 LBS | BODY MASS INDEX: 20.8 KG/M2

## 2024-07-15 DIAGNOSIS — Z12.31 ENCOUNTER FOR SCREENING MAMMOGRAM FOR BREAST CANCER: ICD-10-CM

## 2024-07-15 PROCEDURE — 77063 BREAST TOMOSYNTHESIS BI: CPT

## 2024-07-23 ENCOUNTER — OFFICE VISIT (OUTPATIENT)
Dept: FAMILY MEDICINE CLINIC | Age: 54
End: 2024-07-23
Payer: COMMERCIAL

## 2024-07-23 VITALS
TEMPERATURE: 99.1 F | OXYGEN SATURATION: 95 % | SYSTOLIC BLOOD PRESSURE: 161 MMHG | WEIGHT: 101 LBS | HEIGHT: 61 IN | DIASTOLIC BLOOD PRESSURE: 88 MMHG | RESPIRATION RATE: 16 BRPM | BODY MASS INDEX: 19.07 KG/M2

## 2024-07-23 DIAGNOSIS — Z01.818 PRE-OP EXAM: Primary | ICD-10-CM

## 2024-07-23 DIAGNOSIS — J02.9 SORE THROAT: ICD-10-CM

## 2024-07-23 DIAGNOSIS — I10 PRIMARY HYPERTENSION: ICD-10-CM

## 2024-07-23 PROCEDURE — 3079F DIAST BP 80-89 MM HG: CPT

## 2024-07-23 PROCEDURE — 99213 OFFICE O/P EST LOW 20 MIN: CPT

## 2024-07-23 PROCEDURE — 3017F COLORECTAL CA SCREEN DOC REV: CPT

## 2024-07-23 PROCEDURE — 4004F PT TOBACCO SCREEN RCVD TLK: CPT

## 2024-07-23 PROCEDURE — G8420 CALC BMI NORM PARAMETERS: HCPCS

## 2024-07-23 PROCEDURE — G8428 CUR MEDS NOT DOCUMENT: HCPCS

## 2024-07-23 PROCEDURE — 3077F SYST BP >= 140 MM HG: CPT

## 2024-07-23 RX ORDER — HYDROCHLOROTHIAZIDE 25 MG/1
25 TABLET ORAL DAILY
COMMUNITY
Start: 2024-06-09

## 2024-07-23 NOTE — PROGRESS NOTES
[facility]  FAMILY MEDICINE RESIDENCY PROGRAM  DATE OF VISIT : 2024    Patient : En Perry   Age : 53 y.o.    : 1970   MRN : [id number]   ______________________________________________________________________    Chief Complaint:   Chief Complaint   Patient presents with    Pre-op Exam     Has PAT 24    Medication Refill       HPI:   En Perry is a 53 y.o. female who presents for pre-op clearance. Patient has a hx of tobacco use and COPD. The patient is going to have a vaginal hysterectomy on 2024 for a thickened endometrium which results in abnormal uterine bleeding. She has been in menopause since 2017 but she still has severe uterine cramping with occasional breakthrough bleeding. She has had multiple episodes of D&C for this, so she is going to have hysterectomy.   The patient states that she is able to walk upstairs, clean and work around her house, and walk at least 3 mph on flat ground w/o chest pain, shortness of breath, lightheadedness, or dizziness. She has a hx of COPD for which she uses Spiriva daily. She states that pending on the weather, she will use her rescue inhaler 1-2x/week. Denies nighttime awakening symptoms.  She has no prior history of CVA, CAD, ischemic heart disease, diabetes, or renal disease. She is not on any anticoagulants at this time.     Sore throat: Patient states that she has had a sore throat for the last three days. She states that two of her grandsons were recently diagnosed with strep throat. She states that otherwise she feels fine and she has been afebrile. She denies dysphagia/odynophagia/shortness of breath. Denies abd pain/n/v/d.     HTN: Not controlled today- patient takes 25 mg HCTZ, states she forgot to take her medication this AM. Patient denies Lightheadedness, Dizziness, Chest pain, Shortness of breath, Abdominal pain, Nausea, Vomiting, Peripheral edema      Past Medical History:  Past Medical History:

## 2024-07-23 NOTE — PROGRESS NOTES
Patient is a 53-year-old female past medical history of hypertension who presents for preop clearance.  The patient is to have a transvaginal hysterectomy August 8, 2024.  She has this because of a history of thickened endometrium which resulted in abnormal uterine bleeding.  The patient has been in menopause since 2017 but she still has severe uterine cramping with occasional breakthrough bleeding for which she has had multiple dilation and curettage is.  She is now going to have hysterectomy for this.    Preop clearance-the patient states that she is able to walk up stairs, clean and work around her house, walk at least 3 mph on flat ground, and does not experience chest pain, shortness of breath, lightheadedness, or dizziness.  She has a history of COPD for which she uses Spiriva daily she has a history of COPD for which she has a Spiriva daily and albuterol 1-2 times a week depending on the weather.  She denies nighttime symptoms.  She states that she does not feel her COPD substantially limits her daily activity.  She has no prior history of CVA, CAD, ischemic heart disease, CHF, diabetes, or renal disease.  She is not on anticoagulants at this time.    Sore throat-patient did states she has had a sore throat for the last 3 days.  She says 2 of her grandsons were recently diagnosed with strep throat.  She says she feels fine, has been afebrile.  Denies dysphagia, odynophagia, shortness of breath, abdominal pain, nausea/vomiting/diarrhea, does endorse a slight cough.    Hypertension-uncontrolled today, but patient states that she did not take her hydrochlorothiazide this morning.    Blood pressure (!) 161/88, temperature 99.1 °F (37.3 °C), temperature source Temporal, resp. rate 16, height 1.549 m (5' 1\"), weight 45.8 kg (101 lb), last menstrual period 12/14/2017, SpO2 95 %, not currently breastfeeding.    HEENT WNL     Heart regular    Lungs clear    abd non-tender      No edema    Pulses intact     Assessment

## 2024-07-24 PROBLEM — I10 PRIMARY HYPERTENSION: Status: ACTIVE | Noted: 2024-07-24

## 2024-07-24 PROBLEM — J45.909 MODERATE ASTHMA WITHOUT COMPLICATION: Status: RESOLVED | Noted: 2020-09-22 | Resolved: 2024-07-24

## 2024-07-25 ENCOUNTER — NURSE ONLY (OUTPATIENT)
Dept: FAMILY MEDICINE CLINIC | Age: 54
End: 2024-07-25

## 2024-07-25 VITALS
SYSTOLIC BLOOD PRESSURE: 172 MMHG | OXYGEN SATURATION: 95 % | HEART RATE: 80 BPM | DIASTOLIC BLOOD PRESSURE: 92 MMHG | RESPIRATION RATE: 18 BRPM

## 2024-07-25 DIAGNOSIS — I10 PRIMARY HYPERTENSION: Primary | ICD-10-CM

## 2024-07-31 ENCOUNTER — TELEPHONE (OUTPATIENT)
Dept: FAMILY MEDICINE CLINIC | Age: 54
End: 2024-07-31

## 2024-07-31 NOTE — TELEPHONE ENCOUNTER
Attempting to call and make adjustment to patient BP medication- all phone numbers in patient chart are either invalid, have a full voicemail, or do not have voicemail set up. Will send letter

## 2024-10-14 ENCOUNTER — HOSPITAL ENCOUNTER (OUTPATIENT)
Age: 54
Discharge: HOME | End: 2024-10-16

## 2024-10-14 LAB
ABO + RH BLD: NORMAL
ANION GAP SERPL CALCULATED.3IONS-SCNC: 8 MMOL/L (ref 7–16)
ARM BAND NUMBER: NORMAL
BLOOD BANK SAMPLE EXPIRATION: NORMAL
BLOOD GROUP ANTIBODIES SERPL: NEGATIVE
BUN SERPL-MCNC: 12 MG/DL (ref 6–20)
CALCIUM SERPL-MCNC: 10.1 MG/DL (ref 8.6–10.2)
CHLORIDE SERPL-SCNC: 99 MMOL/L (ref 98–107)
CO2 SERPL-SCNC: 30 MMOL/L (ref 22–29)
CREAT SERPL-MCNC: 0.7 MG/DL (ref 0.5–1)
ERYTHROCYTE [DISTWIDTH] IN BLOOD BY AUTOMATED COUNT: 13.2 % (ref 11.5–15)
GFR, ESTIMATED: >90 ML/MIN/1.73M2
GLUCOSE SERPL-MCNC: 93 MG/DL (ref 74–99)
HCT VFR BLD AUTO: 43.5 % (ref 34–48)
HGB BLD-MCNC: 13.8 G/DL (ref 11.5–15.5)
MCH RBC QN AUTO: 31.2 PG (ref 26–35)
MCHC RBC AUTO-ENTMCNC: 31.7 G/DL (ref 32–34.5)
MCV RBC AUTO: 98.2 FL (ref 80–99.9)
PLATELET # BLD AUTO: 205 K/UL (ref 130–450)
PMV BLD AUTO: 10.5 FL (ref 7–12)
POTASSIUM SERPL-SCNC: 3.5 MMOL/L (ref 3.5–5)
RBC # BLD AUTO: 4.43 M/UL (ref 3.5–5.5)
SODIUM SERPL-SCNC: 137 MMOL/L (ref 132–146)
WBC OTHER # BLD: 6.8 K/UL (ref 4.5–11.5)

## 2024-10-14 PROCEDURE — 85027 COMPLETE CBC AUTOMATED: CPT

## 2024-10-14 PROCEDURE — 87081 CULTURE SCREEN ONLY: CPT

## 2024-10-14 PROCEDURE — 86901 BLOOD TYPING SEROLOGIC RH(D): CPT

## 2024-10-14 PROCEDURE — 86900 BLOOD TYPING SEROLOGIC ABO: CPT

## 2024-10-14 PROCEDURE — 80048 BASIC METABOLIC PNL TOTAL CA: CPT

## 2024-10-14 PROCEDURE — 86850 RBC ANTIBODY SCREEN: CPT

## 2024-10-16 LAB
MICROORGANISM SPEC CULT: NORMAL
SPECIMEN DESCRIPTION: NORMAL

## 2024-10-17 ENCOUNTER — HOSPITAL ENCOUNTER (OUTPATIENT)
Age: 54
Discharge: HOME | End: 2024-10-19

## 2024-10-18 ENCOUNTER — HOSPITAL ENCOUNTER (OUTPATIENT)
Age: 54
Discharge: HOME | End: 2024-10-20

## 2024-10-18 LAB
ANION GAP SERPL CALCULATED.3IONS-SCNC: 6 MMOL/L (ref 7–16)
BUN SERPL-MCNC: 9 MG/DL (ref 6–20)
CALCIUM SERPL-MCNC: 8.6 MG/DL (ref 8.6–10.2)
CHLORIDE SERPL-SCNC: 101 MMOL/L (ref 98–107)
CO2 SERPL-SCNC: 29 MMOL/L (ref 22–29)
CREAT SERPL-MCNC: 0.9 MG/DL (ref 0.5–1)
ERYTHROCYTE [DISTWIDTH] IN BLOOD BY AUTOMATED COUNT: 13.5 % (ref 11.5–15)
GFR, ESTIMATED: 78 ML/MIN/1.73M2
GLUCOSE SERPL-MCNC: 97 MG/DL (ref 74–99)
HCT VFR BLD AUTO: 34.1 % (ref 34–48)
HGB BLD-MCNC: 10.3 G/DL (ref 11.5–15.5)
MCH RBC QN AUTO: 30.9 PG (ref 26–35)
MCHC RBC AUTO-ENTMCNC: 30.2 G/DL (ref 32–34.5)
MCV RBC AUTO: 102.4 FL (ref 80–99.9)
PLATELET # BLD AUTO: 193 K/UL (ref 130–450)
PMV BLD AUTO: 11.1 FL (ref 7–12)
POTASSIUM SERPL-SCNC: 4 MMOL/L (ref 3.5–5)
RBC # BLD AUTO: 3.33 M/UL (ref 3.5–5.5)
SODIUM SERPL-SCNC: 136 MMOL/L (ref 132–146)
WBC OTHER # BLD: 8.1 K/UL (ref 4.5–11.5)

## 2024-10-18 PROCEDURE — 85027 COMPLETE CBC AUTOMATED: CPT

## 2024-10-18 PROCEDURE — 80048 BASIC METABOLIC PNL TOTAL CA: CPT

## 2024-10-23 LAB — SURGICAL PATHOLOGY REPORT: NORMAL

## 2025-04-29 ENCOUNTER — APPOINTMENT (OUTPATIENT)
Dept: GENERAL RADIOLOGY | Age: 55
End: 2025-04-29
Payer: COMMERCIAL

## 2025-04-29 ENCOUNTER — HOSPITAL ENCOUNTER (EMERGENCY)
Age: 55
Discharge: HOME | End: 2025-04-29
Payer: COMMERCIAL

## 2025-04-29 ENCOUNTER — APPOINTMENT (OUTPATIENT)
Dept: CT IMAGING | Age: 55
End: 2025-04-29
Payer: COMMERCIAL

## 2025-04-29 VITALS
RESPIRATION RATE: 20 BRPM | DIASTOLIC BLOOD PRESSURE: 86 MMHG | HEART RATE: 76 BPM | TEMPERATURE: 98.5 F | OXYGEN SATURATION: 94 % | SYSTOLIC BLOOD PRESSURE: 153 MMHG

## 2025-04-29 DIAGNOSIS — M25.551 PAIN OF RIGHT HIP: Primary | ICD-10-CM

## 2025-04-29 DIAGNOSIS — K59.00 CONSTIPATION, UNSPECIFIED CONSTIPATION TYPE: ICD-10-CM

## 2025-04-29 LAB
ALBUMIN SERPL-MCNC: 4.3 G/DL (ref 3.5–5.2)
ALP SERPL-CCNC: 77 U/L (ref 35–104)
ALT SERPL-CCNC: 8 U/L (ref 0–32)
ANION GAP SERPL CALCULATED.3IONS-SCNC: 7 MMOL/L (ref 7–16)
AST SERPL-CCNC: <5 U/L (ref 0–31)
BASOPHILS # BLD: 0.04 K/UL (ref 0–0.2)
BASOPHILS NFR BLD: 1 % (ref 0–2)
BILIRUB SERPL-MCNC: 0.2 MG/DL (ref 0–1.2)
BILIRUB UR QL STRIP: NEGATIVE
BUN SERPL-MCNC: 15 MG/DL (ref 6–20)
CALCIUM SERPL-MCNC: 10.3 MG/DL (ref 8.6–10.2)
CHLORIDE SERPL-SCNC: 99 MMOL/L (ref 98–107)
CLARITY UR: CLEAR
CO2 SERPL-SCNC: 34 MMOL/L (ref 22–29)
COLOR UR: YELLOW
CREAT SERPL-MCNC: 0.7 MG/DL (ref 0.5–1)
EOSINOPHIL # BLD: 0.09 K/UL (ref 0.05–0.5)
EOSINOPHILS RELATIVE PERCENT: 2 % (ref 0–6)
ERYTHROCYTE [DISTWIDTH] IN BLOOD BY AUTOMATED COUNT: 12.9 % (ref 11.5–15)
GFR, ESTIMATED: >90 ML/MIN/1.73M2
GLUCOSE SERPL-MCNC: 81 MG/DL (ref 74–99)
GLUCOSE UR STRIP-MCNC: NEGATIVE MG/DL
HCT VFR BLD AUTO: 45 % (ref 34–48)
HGB BLD-MCNC: 14.7 G/DL (ref 11.5–15.5)
HGB UR QL STRIP.AUTO: NEGATIVE
IMM GRANULOCYTES # BLD AUTO: <0.03 K/UL (ref 0–0.58)
IMM GRANULOCYTES NFR BLD: 0 % (ref 0–5)
KETONES UR STRIP-MCNC: NEGATIVE MG/DL
LACTATE BLDV-SCNC: 1 MMOL/L (ref 0.5–2.2)
LEUKOCYTE ESTERASE UR QL STRIP: NEGATIVE
LYMPHOCYTES NFR BLD: 1.03 K/UL (ref 1.5–4)
LYMPHOCYTES RELATIVE PERCENT: 20 % (ref 20–42)
MCH RBC QN AUTO: 31.7 PG (ref 26–35)
MCHC RBC AUTO-ENTMCNC: 32.7 G/DL (ref 32–34.5)
MCV RBC AUTO: 97 FL (ref 80–99.9)
MONOCYTES NFR BLD: 0.32 K/UL (ref 0.1–0.95)
MONOCYTES NFR BLD: 6 % (ref 2–12)
NEUTROPHILS NFR BLD: 71 % (ref 43–80)
NEUTS SEG NFR BLD: 3.67 K/UL (ref 1.8–7.3)
NITRITE UR QL STRIP: NEGATIVE
PH UR STRIP: 6 [PH] (ref 5–8)
PLATELET # BLD AUTO: 243 K/UL (ref 130–450)
PMV BLD AUTO: 9.2 FL (ref 7–12)
POTASSIUM SERPL-SCNC: 4.6 MMOL/L (ref 3.5–5)
PROT SERPL-MCNC: 7.2 G/DL (ref 6.4–8.3)
PROT UR STRIP-MCNC: NEGATIVE MG/DL
RBC # BLD AUTO: 4.64 M/UL (ref 3.5–5.5)
RBC #/AREA URNS HPF: ABNORMAL /HPF
SODIUM SERPL-SCNC: 140 MMOL/L (ref 132–146)
SP GR UR STRIP: <1.005 (ref 1–1.03)
UROBILINOGEN UR STRIP-ACNC: 0.2 EU/DL (ref 0–1)
WBC #/AREA URNS HPF: ABNORMAL /HPF
WBC OTHER # BLD: 5.2 K/UL (ref 4.5–11.5)

## 2025-04-29 PROCEDURE — 83605 ASSAY OF LACTIC ACID: CPT

## 2025-04-29 PROCEDURE — 6360000002 HC RX W HCPCS: Performed by: NURSE PRACTITIONER

## 2025-04-29 PROCEDURE — 80053 COMPREHEN METABOLIC PANEL: CPT

## 2025-04-29 PROCEDURE — 74177 CT ABD & PELVIS W/CONTRAST: CPT

## 2025-04-29 PROCEDURE — 96374 THER/PROPH/DIAG INJ IV PUSH: CPT

## 2025-04-29 PROCEDURE — 99285 EMERGENCY DEPT VISIT HI MDM: CPT

## 2025-04-29 PROCEDURE — 81001 URINALYSIS AUTO W/SCOPE: CPT

## 2025-04-29 PROCEDURE — 6360000004 HC RX CONTRAST MEDICATION: Performed by: RADIOLOGY

## 2025-04-29 PROCEDURE — 73502 X-RAY EXAM HIP UNI 2-3 VIEWS: CPT

## 2025-04-29 PROCEDURE — 85025 COMPLETE CBC W/AUTO DIFF WBC: CPT

## 2025-04-29 RX ORDER — KETOROLAC TROMETHAMINE 30 MG/ML
15 INJECTION, SOLUTION INTRAMUSCULAR; INTRAVENOUS ONCE
Status: COMPLETED | OUTPATIENT
Start: 2025-04-29 | End: 2025-04-29

## 2025-04-29 RX ORDER — IOPAMIDOL 755 MG/ML
75 INJECTION, SOLUTION INTRAVASCULAR
Status: COMPLETED | OUTPATIENT
Start: 2025-04-29 | End: 2025-04-29

## 2025-04-29 RX ORDER — DOCUSATE SODIUM 100 MG/1
100 CAPSULE, LIQUID FILLED ORAL 2 TIMES DAILY
Qty: 14 CAPSULE | Refills: 0 | Status: SHIPPED | OUTPATIENT
Start: 2025-04-29 | End: 2025-05-06

## 2025-04-29 RX ORDER — POLYETHYLENE GLYCOL 3350 17 G/17G
17 POWDER, FOR SOLUTION ORAL DAILY
Qty: 510 G | Refills: 0 | Status: SHIPPED | OUTPATIENT
Start: 2025-04-29

## 2025-04-29 RX ORDER — NAPROXEN 500 MG/1
500 TABLET ORAL 2 TIMES DAILY
Qty: 20 TABLET | Refills: 0 | Status: SHIPPED | OUTPATIENT
Start: 2025-04-29 | End: 2025-05-09

## 2025-04-29 RX ADMIN — IOPAMIDOL 75 ML: 755 INJECTION, SOLUTION INTRAVENOUS at 14:34

## 2025-04-29 RX ADMIN — KETOROLAC TROMETHAMINE 15 MG: 30 INJECTION, SOLUTION INTRAMUSCULAR at 15:11

## 2025-04-29 ASSESSMENT — PAIN DESCRIPTION - PAIN TYPE
TYPE: ACUTE PAIN
TYPE: ACUTE PAIN

## 2025-04-29 ASSESSMENT — PAIN DESCRIPTION - ONSET
ONSET: ON-GOING
ONSET: ON-GOING

## 2025-04-29 ASSESSMENT — PAIN DESCRIPTION - FREQUENCY
FREQUENCY: INTERMITTENT
FREQUENCY: CONTINUOUS

## 2025-04-29 ASSESSMENT — PAIN - FUNCTIONAL ASSESSMENT
PAIN_FUNCTIONAL_ASSESSMENT: ACTIVITIES ARE NOT PREVENTED
PAIN_FUNCTIONAL_ASSESSMENT: ACTIVITIES ARE NOT PREVENTED
PAIN_FUNCTIONAL_ASSESSMENT: 0-10
PAIN_FUNCTIONAL_ASSESSMENT: 0-10

## 2025-04-29 ASSESSMENT — PAIN SCALES - GENERAL
PAINLEVEL_OUTOF10: 6

## 2025-04-29 ASSESSMENT — PAIN DESCRIPTION - DESCRIPTORS
DESCRIPTORS: ACHING;CRAMPING;SORE
DESCRIPTORS: ACHING;SORE;CRAMPING
DESCRIPTORS: JABBING;SORE

## 2025-04-29 ASSESSMENT — PAIN DESCRIPTION - LOCATION
LOCATION: ABDOMEN;GROIN;FLANK
LOCATION: FLANK;GROIN
LOCATION: ABDOMEN;FLANK;GROIN

## 2025-04-29 ASSESSMENT — PAIN DESCRIPTION - ORIENTATION
ORIENTATION: RIGHT

## 2025-04-29 NOTE — ED PROVIDER NOTES
[facility] EMERGENCY DEPARTMENT  ED  Encounter Note  Admit Date/RoomTime: 2025 11:29 AM  ED Room:   NAME: En Perry  : 1970  MRN: [id number]  PCP: Romero Joyner MD    CHIEF COMPLAINT     Groin Pain (right hip & groin pain, states pain since hysterectomy in 10/24)    HISTORY OF PRESENT ILLNESS        En Perry is a 54 y.o. female who presents to the ED by private vehicle for right hip and groin pain.  Worse with ambulation.  States she has to step up high to get into a truck for transportation and felt a pulling when she did this approximately 2 months ago.  The complaint has been stable and are moderate in severity.  Associated hard small balls of stool.  Denies any nausea vomiting diarrhea.  Denies any dysuria.  Status post hysterectomy 2024.    REVIEW OF SYSTEMS     Pertinent positives and negatives are stated within HPI, all other systems reviewed and are negative.    Past Medical History:  has a past medical history of Anxiety and depression, Asthma, Chronic pelvic pain in female, COPD (chronic obstructive pulmonary disease) (HCC), Left hip pain, Left leg pain, Neuropathy, Osteoarthritis, Post-menopausal bleeding, Thickened endometrium, and Tobacco abuse.  Surgical History:  has a past surgical history that includes bladder repair (); LEEP (); Tubal ligation; Dilation and curettage of uterus (10/09/2012); hysteroscopy (10/09/2012); Dilation and curettage of uterus (2013); other surgical history (2013); Dilation and curettage of uterus (N/A, 2019); Colonoscopy (2019); Colonoscopy (N/A, 2019); Tonsillectomy; pr clsr esophagostomy/fstl tthrc/tabdl appr (N/A, 2020); Biopsy mouth lesion (N/A, 2020); Dilation and curettage of uterus (N/A, 10/20/2020); and Bunionectomy ().  Social History:  reports that she has been smoking cigarettes. She started smoking about 43 years ago. She has a 21.7 pack-year

## 2025-05-20 ENCOUNTER — APPOINTMENT (OUTPATIENT)
Dept: ULTRASOUND IMAGING | Age: 55
End: 2025-05-20
Payer: COMMERCIAL

## 2025-05-20 PROCEDURE — 99284 EMERGENCY DEPT VISIT MOD MDM: CPT

## 2025-05-20 PROCEDURE — 93971 EXTREMITY STUDY: CPT

## 2025-05-20 ASSESSMENT — LIFESTYLE VARIABLES
HOW MANY STANDARD DRINKS CONTAINING ALCOHOL DO YOU HAVE ON A TYPICAL DAY: PATIENT DOES NOT DRINK
HOW OFTEN DO YOU HAVE A DRINK CONTAINING ALCOHOL: NEVER

## 2025-05-21 ENCOUNTER — HOSPITAL ENCOUNTER (EMERGENCY)
Age: 55
Discharge: HOME | End: 2025-05-21
Payer: COMMERCIAL

## 2025-05-21 VITALS
TEMPERATURE: 98.4 F | HEIGHT: 60 IN | SYSTOLIC BLOOD PRESSURE: 132 MMHG | RESPIRATION RATE: 20 BRPM | WEIGHT: 115 LBS | OXYGEN SATURATION: 98 % | DIASTOLIC BLOOD PRESSURE: 80 MMHG | BODY MASS INDEX: 22.58 KG/M2 | HEART RATE: 86 BPM

## 2025-05-21 DIAGNOSIS — M54.31 SCIATICA OF RIGHT SIDE: ICD-10-CM

## 2025-05-21 DIAGNOSIS — M79.604 RIGHT LEG PAIN: Primary | ICD-10-CM

## 2025-05-21 PROCEDURE — 6370000000 HC RX 637 (ALT 250 FOR IP): Performed by: PHYSICIAN ASSISTANT

## 2025-05-21 RX ORDER — HYDROCODONE BITARTRATE AND ACETAMINOPHEN 5; 325 MG/1; MG/1
1 TABLET ORAL ONCE
Status: COMPLETED | OUTPATIENT
Start: 2025-05-21 | End: 2025-05-21

## 2025-05-21 RX ORDER — PREDNISONE 20 MG/1
40 TABLET ORAL ONCE
Status: COMPLETED | OUTPATIENT
Start: 2025-05-21 | End: 2025-05-21

## 2025-05-21 RX ORDER — NAPROXEN 500 MG/1
500 TABLET ORAL 2 TIMES DAILY
Qty: 20 TABLET | Refills: 0 | Status: SHIPPED | OUTPATIENT
Start: 2025-05-21 | End: 2025-05-31

## 2025-05-21 RX ORDER — METHYLPREDNISOLONE 4 MG/1
TABLET ORAL
Qty: 21 TABLET | Refills: 0 | Status: SHIPPED | OUTPATIENT
Start: 2025-05-21 | End: 2025-05-27

## 2025-05-21 RX ADMIN — PREDNISONE 40 MG: 20 TABLET ORAL at 01:17

## 2025-05-21 RX ADMIN — HYDROCODONE BITARTRATE AND ACETAMINOPHEN 1 TABLET: 5; 325 TABLET ORAL at 01:17

## 2025-05-21 NOTE — ED PROVIDER NOTES
Independent JEFFY Visit.        [facility] EMERGENCY DEPARTMENT  ED  Encounter Note  Admit Date/RoomTime: 2025 12:17 AM  ED Room: 36/36  NAME: En Perry  : 1970  MRN: [id number]  PCP: Dionisio Vasquez, JENNYFER - CNP    CHIEF COMPLAINT     Leg Pain (R shin pain x2 weeks- denies injury)    HISTORY OF PRESENT ILLNESS        En Perry is a 54 y.o. female who presents to the ED by private vehicle for right lateral shin pain, beginning a several day(s) ago. The complaint has been persistent and are mild in severity.  Patient reports she has been having right hip pain as well and recently had imaging which showed no acute findings.  Patient denies any history of problems with her back or current back pain.  She has no injury.  Patient is a smoker.  She has COPD and wears oxygen 24/.    REVIEW OF SYSTEMS     Pertinent positives and negatives are stated within HPI, all other systems reviewed and are negative.    Past Medical History:  has a past medical history of Anxiety and depression, Asthma, Chronic pelvic pain in female, COPD (chronic obstructive pulmonary disease) (HCC), Left hip pain, Left leg pain, Neuropathy, Osteoarthritis, Post-menopausal bleeding, Thickened endometrium, and Tobacco abuse.  Surgical History:  has a past surgical history that includes bladder repair (); LEEP (); Tubal ligation; Dilation and curettage of uterus (10/09/2012); hysteroscopy (10/09/2012); Dilation and curettage of uterus (2013); other surgical history (2013); Dilation and curettage of uterus (N/A, 2019); Colonoscopy (2019); Colonoscopy (N/A, 2019); Tonsillectomy; pr clsr esophagostomy/fstl tthrc/tabdl appr (N/A, 2020); Biopsy mouth lesion (N/A, 2020); Dilation and curettage of uterus (N/A, 10/20/2020); and Bunionectomy ().  Social History:  reports that she has been smoking cigarettes. She started smoking about 43 years ago. She has a

## 2025-06-26 ENCOUNTER — TRANSCRIBE ORDERS (OUTPATIENT)
Dept: ADMINISTRATIVE | Age: 55
End: 2025-06-26

## 2025-06-26 DIAGNOSIS — F17.210 CIGARETTE NICOTINE DEPENDENCE WITHOUT COMPLICATION: ICD-10-CM

## 2025-06-26 DIAGNOSIS — Z87.891 PERSONAL HISTORY OF TOBACCO USE: Primary | ICD-10-CM

## 2025-08-03 ENCOUNTER — HOSPITAL ENCOUNTER (OUTPATIENT)
Dept: CT IMAGING | Age: 55
Discharge: HOME | End: 2025-08-05
Payer: COMMERCIAL

## 2025-08-03 DIAGNOSIS — Z87.891 PERSONAL HISTORY OF TOBACCO USE: ICD-10-CM

## 2025-08-03 DIAGNOSIS — F17.210 CIGARETTE NICOTINE DEPENDENCE WITHOUT COMPLICATION: ICD-10-CM

## 2025-08-03 PROCEDURE — 71271 CT THORAX LUNG CANCER SCR C-: CPT

## (undated) DEVICE — CONNECTOR TBNG AUX H2O JET DISP FOR OLY 160/180 SER

## (undated) DEVICE — TUBING SUCT 12FR MAL ALUM SHFT FN CAP VENT UNIV CONN W/ OBT

## (undated) DEVICE — 4-PORT MANIFOLD: Brand: NEPTUNE 2

## (undated) DEVICE — BAG PRSS INFUS 1000ML 2 PRSS SFTY VLV RIG HNGR SLIP EASILY

## (undated) DEVICE — DOUBLE BASIN SET: Brand: MEDLINE INDUSTRIES, INC.

## (undated) DEVICE — CANNULA NSL ORAL AD FOR CAPNOFLEX CO2 O2 AIRLFE

## (undated) DEVICE — GUARD TEETH AD PR NYL

## (undated) DEVICE — COVER,LIGHT HANDLE,FLX,2/PK: Brand: MEDLINE INDUSTRIES, INC.

## (undated) DEVICE — GAUZE,SPONGE,POST-OP,4X3,STRL,LF: Brand: MEDLINE

## (undated) DEVICE — JELLY,LUBE,STERILE,FLIP TOP,TUBE,2-OZ: Brand: MEDLINE

## (undated) DEVICE — COVER HNDL LT DISP

## (undated) DEVICE — JELLY,LUBE,STERILE,FLIP TOP,TUBE,4-OZ: Brand: MEDLINE

## (undated) DEVICE — DEFENDO AIR WATER SUCTION AND BIOPSY VALVE KIT FOR  OLYMPUS: Brand: DEFENDO AIR/WATER/SUCTION AND BIOPSY VALVE

## (undated) DEVICE — PAD,SANITARY,11 IN,MAXI,N-STRL,IND WRAP: Brand: MEDLINE

## (undated) DEVICE — PAD,NON-ADHERENT,2X3,STERILE,LF,1/PK: Brand: MEDLINE

## (undated) DEVICE — ELECTRODE PT RET AD L9FT HI MOIST COND ADH HYDRGEL CORDED

## (undated) DEVICE — CAMERA STRYKER 1488 HD GEN

## (undated) DEVICE — TOWEL,OR,DSP,ST,BLUE,STD,6/PK,12PK/CS: Brand: MEDLINE

## (undated) DEVICE — PAD,NON-ADHERENT,3X8,STERILE,LF,1/PK: Brand: MEDLINE

## (undated) DEVICE — Y-TYPE TUR/BLADDER IRRIGATION SET, REGULATING CLAMP

## (undated) DEVICE — PUNCH TONSIL / ADENOID

## (undated) DEVICE — GOWN,SIRUS,FABRNF,L,20/CS: Brand: MEDLINE

## (undated) DEVICE — LEGGINGS, PAIR, 31X48, STERILE: Brand: MEDLINE

## (undated) DEVICE — TRAY PROCED DILATATION CURETTAGE

## (undated) DEVICE — KIT,ANTI FOG,W/SPONGE & FLUID,SOFT PACK: Brand: MEDLINE

## (undated) DEVICE — SET 30 DEGREE HYSTEROSCOPE

## (undated) DEVICE — GLOVE SURG SZ 75 STD WHT LTX SYN POLYMER BEAD REINF ANTI RL

## (undated) DEVICE — PACK PROCEDURE SURG GEN CUST

## (undated) DEVICE — Z INACTIVE USE 2660664 SOLUTION IRRIG 3000ML 0.9% SOD CHL USP UROMATIC PLAS CONT

## (undated) DEVICE — E-Z CLEAN, NON-STICK, PTFE COATED, ELECTROSURGICAL NEEDLE ELECTRODE, MODIFIED EXTENDED INSULATION, 2.75 INCH (7 CM): Brand: MEGADYNE

## (undated) DEVICE — GOWN,SIRUS,FABRNF,XL,20/CS: Brand: MEDLINE

## (undated) DEVICE — TRAY,VAG PREP,2PR VNYL GLV,4 C: Brand: MEDLINE INDUSTRIES, INC.

## (undated) DEVICE — GAUZE,SPONGE,4"X4",16PLY,XRAY,STRL,LF: Brand: MEDLINE

## (undated) DEVICE — CYSTO/BLADDER IRRIGATION SET, REGULATING CLAMP

## (undated) DEVICE — DRAPE,REIN 53X77,STERILE: Brand: MEDLINE

## (undated) DEVICE — SURGICAL PROCEDURE PACK EENT CUST

## (undated) DEVICE — PACK,AURORA,LAVH: Brand: MEDLINE

## (undated) DEVICE — MARKER,SKIN,WI/RULER AND LABELS: Brand: MEDLINE

## (undated) DEVICE — CODMAN® SURGICAL PATTIES 1/2" X 1/2" (1.27CM X 1.27CM): Brand: CODMAN®

## (undated) DEVICE — TRAY SET D

## (undated) DEVICE — PAD SANITARY CRTY MTRN REG ADH STRP DISP NS

## (undated) DEVICE — NEEDLE HYPO 21GA L2IN GRN POLYPR HUB S STL REG BVL STR W/O

## (undated) DEVICE — GLOVE SURG SZ 65 L12IN FNGR THK94MIL STD WHT LTX FREE

## (undated) DEVICE — SET FLD COLL CLR W/ BLT IN WARN SYS FOR HYSTSCP DOLPHIN

## (undated) DEVICE — SOLUTION IV IRRIG POUR BRL 0.9% SODIUM CHL 2F7124

## (undated) DEVICE — SYRINGE MED 10ML TRNSLUC BRL PLUNG BLK MRK POLYPR CTRL